# Patient Record
Sex: MALE | Race: WHITE | NOT HISPANIC OR LATINO | Employment: OTHER | URBAN - METROPOLITAN AREA
[De-identification: names, ages, dates, MRNs, and addresses within clinical notes are randomized per-mention and may not be internally consistent; named-entity substitution may affect disease eponyms.]

---

## 2017-01-04 ENCOUNTER — CLINICAL SUPPORT (OUTPATIENT)
Dept: CARDIOLOGY | Facility: CLINIC | Age: 72
End: 2017-01-04
Payer: MEDICARE

## 2017-01-04 DIAGNOSIS — I73.9 PVD (PERIPHERAL VASCULAR DISEASE): Chronic | ICD-10-CM

## 2017-01-04 LAB — VASCULAR ANKLE BRACHIAL INDEX (ABI) LEFT: 0.68 (ref 0.9–1.2)

## 2017-01-04 PROCEDURE — 93924 LWR XTR VASC STDY BILAT: CPT | Mod: S$GLB,,, | Performed by: INTERNAL MEDICINE

## 2017-01-05 ENCOUNTER — TELEPHONE (OUTPATIENT)
Dept: CARDIOLOGY | Facility: CLINIC | Age: 72
End: 2017-01-05

## 2017-01-05 NOTE — TELEPHONE ENCOUNTER
----- Message from Mayank Borrero MD sent at 1/4/2017  5:17 PM CST -----  Please inform the patient that the test confirms significant blockage in his leg arteries. Continue the new medication plus daily walks . Very important he stop smoking. If blockage worsens, could potentially lose his legs.

## 2017-01-13 RX ORDER — LISINOPRIL 40 MG/1
TABLET ORAL
Qty: 90 TABLET | Refills: 3 | Status: SHIPPED | OUTPATIENT
Start: 2017-01-13 | End: 2017-11-13 | Stop reason: SDUPTHER

## 2017-01-13 RX ORDER — AMLODIPINE BESYLATE 10 MG/1
TABLET ORAL
Qty: 90 TABLET | Refills: 3 | Status: SHIPPED | OUTPATIENT
Start: 2017-01-13 | End: 2017-11-13 | Stop reason: SDUPTHER

## 2017-01-13 RX ORDER — CHLORTHALIDONE 25 MG/1
TABLET ORAL
Qty: 90 TABLET | Refills: 3 | Status: SHIPPED | OUTPATIENT
Start: 2017-01-13 | End: 2017-11-13 | Stop reason: SDUPTHER

## 2017-06-21 ENCOUNTER — OFFICE VISIT (OUTPATIENT)
Dept: GASTROENTEROLOGY | Facility: CLINIC | Age: 72
End: 2017-06-21
Payer: MEDICARE

## 2017-06-21 VITALS
BODY MASS INDEX: 23.74 KG/M2 | HEART RATE: 72 BPM | DIASTOLIC BLOOD PRESSURE: 62 MMHG | SYSTOLIC BLOOD PRESSURE: 108 MMHG | HEIGHT: 66 IN | WEIGHT: 147.69 LBS

## 2017-06-21 DIAGNOSIS — Z12.11 SCREENING FOR MALIGNANT NEOPLASM OF COLON: Primary | ICD-10-CM

## 2017-06-21 DIAGNOSIS — K21.00 GASTROESOPHAGEAL REFLUX DISEASE WITH ESOPHAGITIS: ICD-10-CM

## 2017-06-21 PROCEDURE — 99999 PR PBB SHADOW E&M-EST. PATIENT-LVL III: CPT | Mod: PBBFAC,,, | Performed by: INTERNAL MEDICINE

## 2017-06-21 PROCEDURE — 1159F MED LIST DOCD IN RCRD: CPT | Mod: S$GLB,,, | Performed by: INTERNAL MEDICINE

## 2017-06-21 PROCEDURE — 99499 UNLISTED E&M SERVICE: CPT | Mod: S$GLB,,, | Performed by: INTERNAL MEDICINE

## 2017-06-21 PROCEDURE — 99203 OFFICE O/P NEW LOW 30 MIN: CPT | Mod: S$GLB,,, | Performed by: INTERNAL MEDICINE

## 2017-06-21 PROCEDURE — 1126F AMNT PAIN NOTED NONE PRSNT: CPT | Mod: S$GLB,,, | Performed by: INTERNAL MEDICINE

## 2017-06-21 RX ORDER — OMEPRAZOLE 40 MG/1
40 CAPSULE, DELAYED RELEASE ORAL EVERY MORNING
Qty: 90 CAPSULE | Refills: 3 | Status: SHIPPED | OUTPATIENT
Start: 2017-06-21 | End: 2018-06-20 | Stop reason: SDUPTHER

## 2017-06-21 NOTE — PATIENT INSTRUCTIONS
Colonoscopy scheduled for July 12 with Dr. Mckeon.  [facility] explained contact information  given to patient.

## 2017-06-21 NOTE — PROGRESS NOTES
Subjective:       Patient ID: Rex Song is a 72 y.o. male.    Chief Complaint: Medication Refill    This is a 72-year-old male who presents for evaluation of reflux.  He has some heartburn symptoms in the past and sent dysphagia with esophagitis.  He takes omeprazole 40 mg daily with good control of symptoms.  No dysphagia, odynophagia.  He has a history of Schatzki's ring which is dilated.  No vomiting, melena, unintentional weight loss.  No other exacerbating or relieving factors or other associated symptoms.  There are moderate intensity.  Additionally has a history of colon polyps which were incompletely resected and tattooed with a subsequent colonoscopy noting no abnormal mucosa. A one year follow-up was recommended in 2014.    The following portions of the patient's history were reviewed and updated as appropriate: allergies, current medications, past family history, past medical history, past social history, past surgical history and problem list.    (Portions of this note were dictated using voice recognition software and may contain dictation related errors in spelling/grammar/syntax not found on text review)    HPI  Review of Systems   Constitutional: Negative for chills and fever.   HENT: Negative for postnasal drip and trouble swallowing.    Eyes: Negative for pain and visual disturbance.   Respiratory: Negative for choking and shortness of breath.    Cardiovascular: Negative for chest pain and leg swelling.   Gastrointestinal: Negative for abdominal distention and anal bleeding.   Endocrine: Negative for cold intolerance and heat intolerance.   Genitourinary: Negative for difficulty urinating and hematuria.   Neurological: Negative for dizziness and numbness.   Hematological: Negative for adenopathy. Does not bruise/bleed easily.   Psychiatric/Behavioral: Negative for agitation and confusion.       Objective:      Physical Exam   Constitutional: He is oriented to person, place, and time. He  appears well-developed and well-nourished. No distress.   HENT:   Head: Normocephalic and atraumatic.   Eyes: Conjunctivae are normal. No scleral icterus.   Neck: No tracheal deviation present. No thyromegaly present.   Cardiovascular: Normal rate, regular rhythm and normal heart sounds.  Exam reveals no gallop and no friction rub.    No murmur heard.  Pulmonary/Chest: Effort normal and breath sounds normal. He has no wheezes. He has no rales.   Abdominal: Soft. Bowel sounds are normal. He exhibits no distension. There is no tenderness. There is no rebound and no guarding.   Musculoskeletal: Normal range of motion. He exhibits no edema or tenderness.   Neurological: He is alert and oriented to person, place, and time.   Skin: He is not diaphoretic.   Psychiatric: He has a normal mood and affect. His behavior is normal.   Nursing note and vitals reviewed.      Labs; pathology reviewed   Assessment:       1. Screening for malignant neoplasm of colon    2. Gastroesophageal reflux disease with esophagitis        Plan:   1. Continue PPI, risks/benefits explained in detail  2. Surveillance colonoscopy

## 2017-07-03 ENCOUNTER — TELEPHONE (OUTPATIENT)
Dept: GASTROENTEROLOGY | Facility: CLINIC | Age: 72
End: 2017-07-03

## 2017-07-05 ENCOUNTER — TELEPHONE (OUTPATIENT)
Dept: GASTROENTEROLOGY | Facility: CLINIC | Age: 72
End: 2017-07-05

## 2017-07-13 ENCOUNTER — TELEPHONE (OUTPATIENT)
Dept: GASTROENTEROLOGY | Facility: CLINIC | Age: 72
End: 2017-07-13

## 2017-10-03 ENCOUNTER — TELEPHONE (OUTPATIENT)
Dept: GASTROENTEROLOGY | Facility: CLINIC | Age: 72
End: 2017-10-03

## 2017-10-11 ENCOUNTER — OFFICE VISIT (OUTPATIENT)
Dept: CARDIOLOGY | Facility: CLINIC | Age: 72
End: 2017-10-11
Payer: MEDICARE

## 2017-10-11 VITALS
WEIGHT: 148.13 LBS | DIASTOLIC BLOOD PRESSURE: 62 MMHG | BODY MASS INDEX: 23.25 KG/M2 | SYSTOLIC BLOOD PRESSURE: 110 MMHG | HEART RATE: 73 BPM | HEIGHT: 67 IN

## 2017-10-11 DIAGNOSIS — I10 BENIGN ESSENTIAL HTN: ICD-10-CM

## 2017-10-11 DIAGNOSIS — I73.9 PVD (PERIPHERAL VASCULAR DISEASE): ICD-10-CM

## 2017-10-11 DIAGNOSIS — I25.10 CORONARY ARTERY DISEASE, ANGINA PRESENCE UNSPECIFIED, UNSPECIFIED VESSEL OR LESION TYPE, UNSPECIFIED WHETHER NATIVE OR TRANSPLANTED HEART: Primary | ICD-10-CM

## 2017-10-11 DIAGNOSIS — I71.40 ABDOMINAL AORTIC ANEURYSM (AAA) WITHOUT RUPTURE: ICD-10-CM

## 2017-10-11 PROCEDURE — 99499 UNLISTED E&M SERVICE: CPT | Mod: S$GLB,,, | Performed by: INTERNAL MEDICINE

## 2017-10-11 PROCEDURE — 99999 PR PBB SHADOW E&M-EST. PATIENT-LVL III: CPT | Mod: PBBFAC,GC,, | Performed by: INTERNAL MEDICINE

## 2017-10-11 PROCEDURE — 99213 OFFICE O/P EST LOW 20 MIN: CPT | Mod: GC,S$GLB,, | Performed by: INTERNAL MEDICINE

## 2017-10-11 NOTE — PROGRESS NOTES
I have personally taken the history and examined this patient and agree with the fellow's note as stated above. Patient given the Smoking Cessation program pamphlet and encouraged him to call explaining the potential dangers of his PAD with ongoing smoking.

## 2017-10-11 NOTE — PROGRESS NOTES
Cardiology Clinic Note  Reason for Visit: Coronary Artery Disease    HPI:   Mr. Rex Song is a 72 y.o. gentleman with history of CAD s/p PCI (Cx/PDA 2000), peripheral vascular disease, hypertension with good control, dyslipidemia on high intensity statin At LDL goal, abdominal aortic aneurysm that is small and stable, active smoker.   He had ABIs done 1/4/2017 after last follow up with resting GONZALO b/l 0.68 and on exercise 0.37 (R) and 0.31 (L).  Patient notes that his leg pain has improved from occurring on walking 1 block to now being able to walk 2.5 blocks before stopping.  This improvement is after starting pletal. Pain recedes with rest.  He is aware of his limitations.  He does not have any shortness of breath on exertion, weight gain, PND, orthopnea, chest pain, or palpitations.  He continues to smoke about 1 pack/week and presents to clinic today for follow up.   ROS:    Review of Systems   Constitution: Negative.   HENT: Negative.    Eyes: Negative.    Cardiovascular: Positive for claudication.   Respiratory: Negative.    Endocrine: Negative.    Skin: Negative.    Musculoskeletal: Negative.    Gastrointestinal: Negative.    Genitourinary: Negative.    Neurological: Negative.      PMH:     Past Medical History:   Diagnosis Date    CHF (congestive heart failure)     Hypertension     Personal history of colonic polyps 2005    PVD (peripheral vascular disease)      Past Surgical History:   Procedure Laterality Date    CORONARY ANGIOPLASTY WITH STENT PLACEMENT  08/18/2000    LCX stent/ PDA stent     Allergies:     Review of patient's allergies indicates:   Allergen Reactions    Cortisone      Other reaction(s): Itching     Medications:     Current Outpatient Prescriptions on File Prior to Visit   Medication Sig Dispense Refill    amlodipine (NORVASC) 10 MG tablet TAKE 1 TABLET ONE TIME DAILY 90 tablet 3    aspirin (ECOTRIN) 81 MG EC tablet Take 81 mg by mouth. 1 Tablet, Delayed Release (E.C.)  "Oral At bedtime      chlorthalidone (HYGROTEN) 25 MG Tab TAKE 1 TABLET ONE TIME DAILY 90 tablet 3    cilostazol (PLETAL) 100 MG Tab Take 1 tablet (100 mg total) by mouth 2 (two) times daily before meals. 60 tablet 11    lisinopril (PRINIVIL,ZESTRIL) 40 MG tablet TAKE 1 TABLET ONE TIME DAILY 90 tablet 3    metoprolol succinate (TOPROL-XL) 50 MG 24 hr tablet TAKE 1 TABLET ONE TIME DAILY 90 tablet 3    nitroGLYCERIN (NITROSTAT) 0.4 MG SL tablet Place 1 tablet (0.4 mg total) under the tongue every 5 (five) minutes as needed. 25 tablet 5    omeprazole (PRILOSEC) 40 MG capsule Take 1 capsule (40 mg total) by mouth every morning. 90 capsule 3    rosuvastatin (CRESTOR) 40 MG Tab Take 1 tablet (40 mg total) by mouth once daily. 90 tablet 3     No current facility-administered medications on file prior to visit.      Social History:     Social History   Substance Use Topics    Smoking status: Former Smoker     Packs/day: 0.25     Types: Cigarettes    Smokeless tobacco: Never Used    Alcohol use No     Family History:     Family History   Problem Relation Age of Onset    Prostate cancer Brother 59     Physical Exam:   /62 (BP Location: Left arm, Patient Position: Sitting, BP Method: Large (Automatic))   Pulse 73   Ht 5' 6.5" (1.689 m)   Wt 67.2 kg (148 lb 2.4 oz)   BMI 23.55 kg/m²    Physical Exam   Constitutional: He is oriented to person, place, and time. He appears well-developed and well-nourished.   HENT:   Head: Normocephalic and atraumatic.   Eyes: Conjunctivae and EOM are normal. Pupils are equal, round, and reactive to light.   Neck: Normal range of motion. Neck supple. No JVD present.   Cardiovascular: Normal rate, regular rhythm and normal heart sounds.  Exam reveals no gallop and no friction rub.    No murmur heard.  Claudication at 2.5 blocks   Pulmonary/Chest: Effort normal and breath sounds normal. No respiratory distress. He has no wheezes. He has no rales. He exhibits no tenderness. "   Abdominal: Soft. Bowel sounds are normal. He exhibits no distension. There is no tenderness.   Musculoskeletal: Normal range of motion. He exhibits no edema or tenderness.   Neurological: He is alert and oriented to person, place, and time.   Skin: Skin is warm and dry. No erythema. No pallor.   Cap refill < 2s       Labs:     Lab Results   Component Value Date     2016    K 4.2 2016     2016    CO2 27 2016    BUN 24 (H) 2016    CREATININE 1.7 (H) 2016    ANIONGAP 9 2016     No results found for: HGBA1C  No results found for: BNP, BNPTRIAGEBLO Lab Results   Component Value Date    WBC 7.22 2015    HGB 16.1 2015    HCT 51.1 2015     2015    GRAN 5.1 2015    GRAN 70.8 2015     Lab Results   Component Value Date    CHOL 122 2016    HDL 28 (L) 2016    LDLCALC 52.0 (L) 2016    TRIG 210 (H) 2016          Imagin/2017 GONZALO as above    Echo 2015  CONCLUSIONS     1 - Low normal to mildly depressed left ventricular systolic function (EF 50%).     2 - Normal left ventricular diastolic function.     3 - Normal right ventricular systolic function .     4 - Trivial aortic regurgitation.     5 - Trivial tricuspid regurgitation.     6 - Low central venous pressure.     SPECT   1. There is a small to moderate size fixed defect of moderate intensity that extends from the base to the distal inferior wall of the left ventricle, consistent with myocardial injury. There is mild golden-injury ischemia.   2. There is trivial apical thinning which is a normal variant.   3. There is abnormal wall motion at rest showing hypokinesis of the inferoseptal wall of the left ventricle.   4. There is resting LV dysfunction with a reduced ejection fraction of 42 %.  (normal is >= 51%)  5. The ventricular volumes are normal at rest and stress.   6. The extracardiac distribution of radioactivity is normal.   7. There was no  previous study available to compare.    EKG: no repeat ekg today  Assessment:     1. Coronary artery disease, angina presence unspecified, unspecified vessel or lesion type, unspecified whether native or transplanted heart    2. PVD (peripheral vascular disease)    3. Benign essential HTN    4. Abdominal aortic aneurysm (AAA) without rupture          Plan:   Coronary artery disease, angina presence unspecified, unspecified vessel or lesion type, unspecified whether native or transplanted heart  Continue amlodipine  Asa  Lisinopril  toprol xl  crestor    PVD (peripheral vascular disease)  Continue asa/pletal/crestor  Improvement in symptoms and activity tolerance  Smoking cessation stressed    Benign essential HTN  Continue toprol/amlodipine/lisinopril/chlorthalidone  Well tolerated    Abdominal aortic aneurysm (AAA) without rupture  Treat PVD as above  Important to quit smoking    Would repeat blood work with cbc/cmp/lipid profile    Discussed with Dr. Borrero. RTC in 1 year.   Spoke with patient re: ADAPTABLE and seemingly willing to partake.    Signed:  Callum Hickman MD  10/11/2017 4:07 PM

## 2017-10-12 ENCOUNTER — TELEPHONE (OUTPATIENT)
Dept: RESEARCH | Facility: HOSPITAL | Age: 72
End: 2017-10-12

## 2017-10-18 ENCOUNTER — LAB VISIT (OUTPATIENT)
Dept: LAB | Facility: HOSPITAL | Age: 72
End: 2017-10-18
Attending: INTERNAL MEDICINE
Payer: MEDICARE

## 2017-10-18 DIAGNOSIS — I73.9 PVD (PERIPHERAL VASCULAR DISEASE): ICD-10-CM

## 2017-10-18 DIAGNOSIS — I25.10 CORONARY ARTERY DISEASE, ANGINA PRESENCE UNSPECIFIED, UNSPECIFIED VESSEL OR LESION TYPE, UNSPECIFIED WHETHER NATIVE OR TRANSPLANTED HEART: ICD-10-CM

## 2017-10-18 LAB
ALBUMIN SERPL BCP-MCNC: 3.4 G/DL
ALP SERPL-CCNC: 105 U/L
ALT SERPL W/O P-5'-P-CCNC: 9 U/L
ANION GAP SERPL CALC-SCNC: 9 MMOL/L
AST SERPL-CCNC: 14 U/L
BASOPHILS # BLD AUTO: 0.05 K/UL
BASOPHILS NFR BLD: 0.8 %
BILIRUB SERPL-MCNC: 0.4 MG/DL
BUN SERPL-MCNC: 25 MG/DL
CALCIUM SERPL-MCNC: 9.7 MG/DL
CHLORIDE SERPL-SCNC: 106 MMOL/L
CHOLEST SERPL-MCNC: 111 MG/DL
CHOLEST/HDLC SERPL: 3.5 {RATIO}
CO2 SERPL-SCNC: 27 MMOL/L
CREAT SERPL-MCNC: 2.4 MG/DL
DIFFERENTIAL METHOD: ABNORMAL
EOSINOPHIL # BLD AUTO: 0.2 K/UL
EOSINOPHIL NFR BLD: 2.5 %
ERYTHROCYTE [DISTWIDTH] IN BLOOD BY AUTOMATED COUNT: 14.1 %
EST. GFR  (AFRICAN AMERICAN): 29.6 ML/MIN/1.73 M^2
EST. GFR  (NON AFRICAN AMERICAN): 25.6 ML/MIN/1.73 M^2
GLUCOSE SERPL-MCNC: 97 MG/DL
HCT VFR BLD AUTO: 43.6 %
HDLC SERPL-MCNC: 32 MG/DL
HDLC SERPL: 28.8 %
HGB BLD-MCNC: 14.3 G/DL
IMM GRANULOCYTES # BLD AUTO: 0.02 K/UL
IMM GRANULOCYTES NFR BLD AUTO: 0.3 %
LDLC SERPL CALC-MCNC: 55.4 MG/DL
LYMPHOCYTES # BLD AUTO: 1.3 K/UL
LYMPHOCYTES NFR BLD: 20.3 %
MCH RBC QN AUTO: 28.5 PG
MCHC RBC AUTO-ENTMCNC: 32.8 G/DL
MCV RBC AUTO: 87 FL
MONOCYTES # BLD AUTO: 0.5 K/UL
MONOCYTES NFR BLD: 8 %
NEUTROPHILS # BLD AUTO: 4.4 K/UL
NEUTROPHILS NFR BLD: 68.1 %
NONHDLC SERPL-MCNC: 79 MG/DL
NRBC BLD-RTO: 0 /100 WBC
PLATELET # BLD AUTO: 144 K/UL
PMV BLD AUTO: 10.4 FL
POTASSIUM SERPL-SCNC: 4.2 MMOL/L
PROT SERPL-MCNC: 7.2 G/DL
RBC # BLD AUTO: 5.02 M/UL
SODIUM SERPL-SCNC: 142 MMOL/L
TRIGL SERPL-MCNC: 118 MG/DL
WBC # BLD AUTO: 6.51 K/UL

## 2017-10-18 PROCEDURE — 36415 COLL VENOUS BLD VENIPUNCTURE: CPT

## 2017-10-18 PROCEDURE — 85025 COMPLETE CBC W/AUTO DIFF WBC: CPT

## 2017-10-18 PROCEDURE — 80061 LIPID PANEL: CPT

## 2017-10-18 PROCEDURE — 80053 COMPREHEN METABOLIC PANEL: CPT

## 2017-10-26 DIAGNOSIS — N28.9 FUNCTION KIDNEY DECREASED: Primary | ICD-10-CM

## 2017-11-13 DIAGNOSIS — I25.10 CORONARY ARTERY DISEASE INVOLVING NATIVE CORONARY ARTERY WITHOUT ANGINA PECTORIS, UNSPECIFIED WHETHER NATIVE OR TRANSPLANTED HEART: ICD-10-CM

## 2017-11-13 DIAGNOSIS — I10 ESSENTIAL HYPERTENSION: ICD-10-CM

## 2017-11-13 RX ORDER — METOPROLOL SUCCINATE 50 MG/1
TABLET, EXTENDED RELEASE ORAL
Qty: 90 TABLET | Refills: 3 | Status: SHIPPED | OUTPATIENT
Start: 2017-11-13 | End: 2019-02-25 | Stop reason: SDUPTHER

## 2017-12-01 RX ORDER — CHLORTHALIDONE 25 MG/1
TABLET ORAL
Qty: 90 TABLET | Refills: 3 | Status: SHIPPED | OUTPATIENT
Start: 2017-12-01 | End: 2022-03-31 | Stop reason: SDUPTHER

## 2017-12-01 RX ORDER — LISINOPRIL 40 MG/1
TABLET ORAL
Qty: 90 TABLET | Refills: 3 | Status: SHIPPED | OUTPATIENT
Start: 2017-12-01 | End: 2019-01-18 | Stop reason: SDUPTHER

## 2017-12-01 RX ORDER — AMLODIPINE BESYLATE 10 MG/1
TABLET ORAL
Qty: 90 TABLET | Refills: 3 | Status: SHIPPED | OUTPATIENT
Start: 2017-12-01 | End: 2019-01-18 | Stop reason: SDUPTHER

## 2017-12-06 ENCOUNTER — LAB VISIT (OUTPATIENT)
Dept: LAB | Facility: HOSPITAL | Age: 72
End: 2017-12-06
Payer: MEDICARE

## 2017-12-06 ENCOUNTER — OFFICE VISIT (OUTPATIENT)
Dept: NEPHROLOGY | Facility: CLINIC | Age: 72
End: 2017-12-06
Payer: MEDICARE

## 2017-12-06 VITALS
HEIGHT: 66 IN | WEIGHT: 149.25 LBS | BODY MASS INDEX: 23.99 KG/M2 | DIASTOLIC BLOOD PRESSURE: 60 MMHG | OXYGEN SATURATION: 95 % | SYSTOLIC BLOOD PRESSURE: 130 MMHG | HEART RATE: 82 BPM

## 2017-12-06 DIAGNOSIS — N18.4 CKD STAGE G4/A1, GFR 15-29 AND ALBUMIN CREATININE RATIO <30 MG/G: Primary | ICD-10-CM

## 2017-12-06 DIAGNOSIS — N18.30 CKD (CHRONIC KIDNEY DISEASE) STAGE 3, GFR 30-59 ML/MIN: ICD-10-CM

## 2017-12-06 LAB
BILIRUB UR QL STRIP: NEGATIVE
CLARITY UR REFRACT.AUTO: CLEAR
COLOR UR AUTO: YELLOW
CREAT UR-MCNC: 111 MG/DL
GLUCOSE UR QL STRIP: NEGATIVE
HGB UR QL STRIP: NEGATIVE
KETONES UR QL STRIP: NEGATIVE
LEUKOCYTE ESTERASE UR QL STRIP: NEGATIVE
NITRITE UR QL STRIP: NEGATIVE
PH UR STRIP: 5 [PH] (ref 5–8)
PROT UR QL STRIP: NEGATIVE
PROT UR-MCNC: 21 MG/DL
PROT/CREAT RATIO, UR: 0.19
SP GR UR STRIP: 1.01 (ref 1–1.03)
URN SPEC COLLECT METH UR: NORMAL
UROBILINOGEN UR STRIP-ACNC: NEGATIVE EU/DL

## 2017-12-06 PROCEDURE — 81003 URINALYSIS AUTO W/O SCOPE: CPT

## 2017-12-06 PROCEDURE — 99204 OFFICE O/P NEW MOD 45 MIN: CPT | Mod: GC,S$GLB,, | Performed by: INTERNAL MEDICINE

## 2017-12-06 PROCEDURE — 82570 ASSAY OF URINE CREATININE: CPT

## 2017-12-06 PROCEDURE — 99999 PR PBB SHADOW E&M-EST. PATIENT-LVL III: CPT | Mod: PBBFAC,GC,, | Performed by: INTERNAL MEDICINE

## 2017-12-06 NOTE — PROGRESS NOTES
CHIEF COMPLAINT/HPI: Rex is a 72 y.o. male for evaluation of RANDY    Mr. Song is a 71 yo WM with HTN, HFrEF (EF 50%, no DD), CAD, and PVD who was referred to nephrology clinic today for elevated sCr. Per chart review he has a degree of CKD with a slowly rising sCr since 2012. No urine studies available for review.   He was diagnosed with HTN in the 1980s. He reports BP has always been well-controlled. No hospitalizations for HTN emergency. Chart review reveals well-controlled BP at other outpatient clinic visits. No h/o DM, nephrolithiasis, frequent UTIs. Denies NSAID use aside from a baby aspirin every night. No family history kidney disease; no h/o ESRD.             Past Medical History:   Diagnosis Date    CHF (congestive heart failure)     Hypertension     Personal history of colonic polyps 2005    PVD (peripheral vascular disease)        Rex reports that he has quit smoking. His smoking use included Cigarettes. He smoked 0.25 packs per day. He has never used smokeless tobacco. He reports that he does not drink alcohol.    Family History   Problem Relation Age of Onset    Prostate cancer Brother 59       ROS:  Const: no fevers. Appetite good. Denies weight loss.   Eyes: no vision changes. +cataract surgery  HENT: no headaches, rhinorrhea, sore throat  Heme: no easy bleeding or bruising  Lymph: no LAD or swollen glands  CV: no chest pain. +intermittent ankle edema  Resp: no SOB or cough  GI: no diarrhea or constipation  : no dysuria or hematuria. +polyuria  Skin: no new skin rashes or lesions.     Current Outpatient Prescriptions   Medication Sig Dispense Refill    amLODIPine (NORVASC) 10 MG tablet TAKE 1 TABLET ONE TIME DAILY 90 tablet 3    aspirin (ECOTRIN) 81 MG EC tablet Take 81 mg by mouth. 1 Tablet, Delayed Release (E.C.) Oral At bedtime      chlorthalidone (HYGROTEN) 25 MG Tab TAKE 1 TABLET ONE TIME DAILY 90 tablet 3    cilostazol (PLETAL) 100 MG Tab Take 1 tablet (100 mg total) by  "mouth 2 (two) times daily before meals. 60 tablet 11    lisinopril (PRINIVIL,ZESTRIL) 40 MG tablet TAKE 1 TABLET ONE TIME DAILY 90 tablet 3    metoprolol succinate (TOPROL-XL) 50 MG 24 hr tablet TAKE 1 TABLET ONE TIME DAILY 90 tablet 3    nitroGLYCERIN (NITROSTAT) 0.4 MG SL tablet Place 1 tablet (0.4 mg total) under the tongue every 5 (five) minutes as needed. 25 tablet 5    omeprazole (PRILOSEC) 40 MG capsule Take 1 capsule (40 mg total) by mouth every morning. 90 capsule 3    rosuvastatin (CRESTOR) 40 MG Tab Take 1 tablet (40 mg total) by mouth once daily. 90 tablet 3     No current facility-administered medications for this visit.        PE:  Blood pressure 130/60, pulse 82, height 5' 6" (1.676 m), weight 67.7 kg (149 lb 4 oz), SpO2 95 %.  Gen: Thin WM in NAD. Appears older than stated age  Eyes: EOMI, clear conjunctivae  HENT: NC/AT. MMM  Neck: supple, no thyromegaly  Lymph: no cervical or supraclavicular LAD  CV: RRR. No peripheral edema  Resp: CTAB, no w/r/r  Abd: Soft, NTND.   Skin: warm. Senile purpura to BLE      Impression/Plan:    1. CKD stage G4/A1, GFR 15-29 and albumin creatinine ratio <30 mg/g      - eGFR < 60 since at least 2004  - progressive and steady decline in renal function since that time  - uncertain etiology. Patient is a poor historian. Chart review reveals well-controlled BPs at outpatient visits. No signs of CHF on exam. Possibly related to renal arterionephrosclerosis in setting of significant vascular disease  - baseline sCr appears to be 2.4-2.5  - ordered urine studies today. No significant proteinuria. RPGN ruled out. Continue ACEi.  - will order renal US   - provided education about the stages of CKD, usual progression, and need to monitor for and treat CKD-related disease in an effort to delay progression of CKD. Informed patient of his current advanced kidney disease and need to start RRT once eGFR < 10.   - RTC 2-3 months      Aminta Hackett, PGY-5  Nephrology " Fellow  [facility]  Pager: [phone]

## 2017-12-11 NOTE — PROGRESS NOTES
[facility] STAFF NOTE    The note from the fellow/resident was reviewed. I have personally interviewed and examined the patient. There were no additional findings with regards to the history or physical exam.    I agree with the assessment and plan of Dr. Mclaughlin.

## 2017-12-12 RX ORDER — ROSUVASTATIN CALCIUM 40 MG/1
40 TABLET, COATED ORAL DAILY
Qty: 90 TABLET | Refills: 3 | Status: SHIPPED | OUTPATIENT
Start: 2017-12-12 | End: 2018-12-17 | Stop reason: SDUPTHER

## 2018-01-31 ENCOUNTER — HOSPITAL ENCOUNTER (OUTPATIENT)
Dept: RADIOLOGY | Facility: HOSPITAL | Age: 73
Discharge: HOME | End: 2018-01-31
Attending: INTERNAL MEDICINE
Payer: MEDICARE

## 2018-01-31 DIAGNOSIS — N18.4 CKD STAGE G4/A1, GFR 15-29 AND ALBUMIN CREATININE RATIO <30 MG/G: ICD-10-CM

## 2018-01-31 PROCEDURE — 76770 US EXAM ABDO BACK WALL COMP: CPT | Mod: TC

## 2018-01-31 PROCEDURE — 76770 US EXAM ABDO BACK WALL COMP: CPT | Mod: 26,,, | Performed by: RADIOLOGY

## 2018-02-07 ENCOUNTER — OFFICE VISIT (OUTPATIENT)
Dept: NEPHROLOGY | Facility: CLINIC | Age: 73
End: 2018-02-07
Payer: MEDICARE

## 2018-02-07 ENCOUNTER — LAB VISIT (OUTPATIENT)
Dept: LAB | Facility: HOSPITAL | Age: 73
End: 2018-02-07
Payer: MEDICARE

## 2018-02-07 VITALS
BODY MASS INDEX: 22.82 KG/M2 | SYSTOLIC BLOOD PRESSURE: 112 MMHG | HEIGHT: 66 IN | OXYGEN SATURATION: 97 % | DIASTOLIC BLOOD PRESSURE: 60 MMHG | HEART RATE: 74 BPM | WEIGHT: 142 LBS

## 2018-02-07 DIAGNOSIS — N18.4 CKD (CHRONIC KIDNEY DISEASE) STAGE 4, GFR 15-29 ML/MIN: Primary | ICD-10-CM

## 2018-02-07 DIAGNOSIS — N28.1 COMPLEX RENAL CYST: ICD-10-CM

## 2018-02-07 DIAGNOSIS — N18.4 CKD (CHRONIC KIDNEY DISEASE) STAGE 4, GFR 15-29 ML/MIN: ICD-10-CM

## 2018-02-07 LAB
BACTERIA #/AREA URNS AUTO: NORMAL /HPF
BILIRUB UR QL STRIP: NEGATIVE
CLARITY UR REFRACT.AUTO: ABNORMAL
COLOR UR AUTO: YELLOW
CREAT UR-MCNC: 152 MG/DL
GLUCOSE UR QL STRIP: NEGATIVE
HGB UR QL STRIP: ABNORMAL
HYALINE CASTS UR QL AUTO: 0 /LPF
KETONES UR QL STRIP: NEGATIVE
LEUKOCYTE ESTERASE UR QL STRIP: NEGATIVE
MICROSCOPIC COMMENT: NORMAL
NITRITE UR QL STRIP: NEGATIVE
PH UR STRIP: 5 [PH] (ref 5–8)
PROT UR QL STRIP: ABNORMAL
PROT UR-MCNC: 65 MG/DL
PROT/CREAT RATIO, UR: 0.43
RBC #/AREA URNS AUTO: 0 /HPF (ref 0–4)
SP GR UR STRIP: 1.02 (ref 1–1.03)
SQUAMOUS #/AREA URNS AUTO: 0 /HPF
URN SPEC COLLECT METH UR: ABNORMAL
UROBILINOGEN UR STRIP-ACNC: 2 EU/DL
WBC #/AREA URNS AUTO: 1 /HPF (ref 0–5)

## 2018-02-07 PROCEDURE — 99999 PR PBB SHADOW E&M-EST. PATIENT-LVL III: CPT | Mod: PBBFAC,GC,, | Performed by: INTERNAL MEDICINE

## 2018-02-07 PROCEDURE — 99214 OFFICE O/P EST MOD 30 MIN: CPT | Mod: GC,S$GLB,, | Performed by: INTERNAL MEDICINE

## 2018-02-07 PROCEDURE — 82570 ASSAY OF URINE CREATININE: CPT

## 2018-02-07 PROCEDURE — 81001 URINALYSIS AUTO W/SCOPE: CPT

## 2018-02-07 PROCEDURE — 1126F AMNT PAIN NOTED NONE PRSNT: CPT | Mod: GC,S$GLB,, | Performed by: INTERNAL MEDICINE

## 2018-02-07 PROCEDURE — 3008F BODY MASS INDEX DOCD: CPT | Mod: GC,S$GLB,, | Performed by: INTERNAL MEDICINE

## 2018-02-07 PROCEDURE — 1159F MED LIST DOCD IN RCRD: CPT | Mod: GC,S$GLB,, | Performed by: INTERNAL MEDICINE

## 2018-02-07 NOTE — PROGRESS NOTES
Subjective:       Patient ID: Rex Song is a 73 y.o. White male who presents for follow-up evaluation of Chronic Kidney Disease    HPI   Mr. Song is a 73 yo WM with HTN, HFrEF (EF 50%, no DD), CAD, and PVD who was referred to nephrology clinic on 12/6/17 for elevated sCr. Per chart review he has a degree of CKD with a slowly rising sCr since 2012. No urine studies available for review. He was diagnosed with HTN in the 1980s. He reports BP has always been well-controlled. No hospitalizations for HTN emergency. Chart review reveals well-controlled BP at other outpatient clinic visits. No h/o DM, nephrolithiasis, frequent UTIs. Denies NSAID use aside from a baby aspirin every night. No family history kidney disease; no h/o ESRD.   Patient is a poor historian.           Interval Hx:  LV 12/2017. No medication changes at that time.   Today he reports to be doing well. No recent clinic visits or hospitalizations.   HTN: well-controlled at home but doesn't check it very often. He reports compliance with his BP medications but cannot name them.     Review of Systems    Resp: no SOB; no cough  CV: no chest pain; no LE edema  Neuro: no HA or light-headedness  : no dysuria, no hematuria  Skin: no new skin rashes or lesions  GI: no diarrhea or constipation      Objective:     Current Outpatient Prescriptions:     amLODIPine (NORVASC) 10 MG tablet, TAKE 1 TABLET ONE TIME DAILY, Disp: 90 tablet, Rfl: 3    aspirin (ECOTRIN) 81 MG EC tablet, Take 81 mg by mouth. 1 Tablet, Delayed Release (E.C.) Oral At bedtime, Disp: , Rfl:     chlorthalidone (HYGROTEN) 25 MG Tab, TAKE 1 TABLET ONE TIME DAILY, Disp: 90 tablet, Rfl: 3    lisinopril (PRINIVIL,ZESTRIL) 40 MG tablet, TAKE 1 TABLET ONE TIME DAILY, Disp: 90 tablet, Rfl: 3    metoprolol succinate (TOPROL-XL) 50 MG 24 hr tablet, TAKE 1 TABLET ONE TIME DAILY, Disp: 90 tablet, Rfl: 3    nitroGLYCERIN (NITROSTAT) 0.4 MG SL tablet, Place 1 tablet (0.4 mg total) under the  "tongue every 5 (five) minutes as needed., Disp: 25 tablet, Rfl: 5    omeprazole (PRILOSEC) 40 MG capsule, Take 1 capsule (40 mg total) by mouth every morning., Disp: 90 capsule, Rfl: 3    rosuvastatin (CRESTOR) 40 MG Tab, TAKE 1 TABLET (40 MG TOTAL) BY MOUTH ONCE DAILY., Disp: 90 tablet, Rfl: 3    cilostazol (PLETAL) 100 MG Tab, Take 1 tablet (100 mg total) by mouth 2 (two) times daily before meals., Disp: 60 tablet, Rfl: 11    Physical Exam    Blood pressure 112/60, pulse 74, height 5' 6" (1.676 m), weight 64.4 kg (141 lb 15.6 oz), SpO2 97 %.   LV 12/6/17: Blood pressure 130/60, pulse 82, height 5' 6" (1.676 m), weight 67.7 kg (149 lb 4 oz), SpO2 95 %  Gen: thin male in NAD  Eyes: EOMI, conjunctival injection bilaterally  HENT: NC/AT. MMM  CV: RRR. No peripheral edema  Resp: CTAB, no crackles  Skin: warm, normal turgor. No visible rash.   Psych: pleasant. Poor insight.       Renal US 1/31/18:  The kidneys are normal in size measuring 11.3 cm on the right and 10.5 cm on the left. There is decreased corticomedullary differentiation and decreased cortical thickness.  Bilateral renal cysts identified, the largest is seen in the lower pole of the right kidney that measures 5.1 x 4.4 x 5.0 cm and demonstrates septations measuring up to 3 mm.  This appears similar to prior CT findings.  Punctate nonobstructing renal calculi, the largest on the right measures 2 mm in the upper pole the largest on the left measures 3 mm in the midpole.  No solid renal massesor hydronephrosis.  Trace amount of perinephric fluid along the inferior pole of the left kidney.  Perfusion to the kidneys is normal. Resistive indices are normal and as follow: 0.73 on the right and 0.69 on the left. The urinary bladder appears normal.  The prostate is normal in size measuring 4.3 x 3.2 x 4.3 cm    Assessment:       1. CKD (chronic kidney disease) stage 4, GFR 15-29 ml/min    2. Complex renal cyst        Plan:   1. CKD:   - eGFR < 60 since at least " 2004  - progressive and steady decline in renal function since that time  - uncertain etiology. Patient is a poor historian. Chart review reveals well-controlled BPs at outpatient visits. No signs of CHF on exam. Possibly related to renal arterionephrosclerosis in setting of significant vascular disease  - baseline sCr appears to be 2.4-2.5  - renal US with CKD-related changes. Also renal cysts with septations; see below.   - sCr at baseline today  - provided education in the past about the stages of CKD, usual progression, and need to monitor for and treat CKD-related disease in an effort to delay progression of CKD. Informed patient of his current advanced kidney disease and need to start RRT once eGFR < 10.   - will attempt to get patient to bring a family member to his next visit to help with patient understanding and education    Lab Results   Component Value Date    CREATININE 2.3 (H) 01/31/2018         Protein Creatinine Ratios: previously negative on ACEi. Mild elevation c/w microalbuminuria today. Will repeat to assess for accuracy.     Prot/Creat Ratio, Ur   Date Value Ref Range Status   01/31/2018 0.45 (H) 0.00 - 0.20 Final   12/06/2017 0.19 0.00 - 0.20 Final       Acid-Base: no need for supplementation  Lab Results   Component Value Date     01/31/2018    K 4.2 01/31/2018    CO2 26 01/31/2018     2. HTN: Blood pressures controlled on 4-drug regimen of amlodipine, chlorthalidone, lisinopril, and toprol xl.     3. Renal osteodystrophy: last PTH wnl.   Lab Results   Component Value Date    PTH 49.0 01/31/2018    CALCIUM 9.9 01/31/2018    PHOS 2.5 (L) 01/31/2018       4. Anemia: none  Lab Results   Component Value Date    HGB 15.3 01/31/2018      5. Renal cyst  - noted on US; reportedly stable compared to CT from 2005 (unable to see report)  - per staff, will refer to urology for further evaluation      RTC 3 months.     Aminta Hackett, PGY-5  Nephrology Fellow  [facility]  Pager:  [phone]

## 2018-02-08 NOTE — PROGRESS NOTES
Patient seen and examined with Dr Hackett;   I have reviewed and agree with assessment and plan

## 2018-02-18 DIAGNOSIS — I73.9 PVD (PERIPHERAL VASCULAR DISEASE): Chronic | ICD-10-CM

## 2018-02-18 RX ORDER — CILOSTAZOL 100 MG/1
100 TABLET ORAL
Qty: 60 TABLET | Refills: 11 | Status: SHIPPED | OUTPATIENT
Start: 2018-02-18 | End: 2022-04-10

## 2018-03-14 ENCOUNTER — OFFICE VISIT (OUTPATIENT)
Dept: UROLOGY | Facility: CLINIC | Age: 73
End: 2018-03-14
Payer: MEDICARE

## 2018-03-14 VITALS
TEMPERATURE: 97 F | SYSTOLIC BLOOD PRESSURE: 117 MMHG | HEART RATE: 68 BPM | DIASTOLIC BLOOD PRESSURE: 62 MMHG | HEIGHT: 62 IN

## 2018-03-14 DIAGNOSIS — N28.1 RENAL CYST: Primary | ICD-10-CM

## 2018-03-14 PROCEDURE — 99999 PR PBB SHADOW E&M-EST. PATIENT-LVL III: CPT | Mod: PBBFAC,,, | Performed by: UROLOGY

## 2018-03-14 PROCEDURE — 3078F DIAST BP <80 MM HG: CPT | Mod: CPTII,S$GLB,, | Performed by: UROLOGY

## 2018-03-14 PROCEDURE — 99499 UNLISTED E&M SERVICE: CPT | Mod: S$GLB,,, | Performed by: UROLOGY

## 2018-03-14 PROCEDURE — 3074F SYST BP LT 130 MM HG: CPT | Mod: CPTII,S$GLB,, | Performed by: UROLOGY

## 2018-03-14 PROCEDURE — 99203 OFFICE O/P NEW LOW 30 MIN: CPT | Mod: S$GLB,,, | Performed by: UROLOGY

## 2018-03-14 NOTE — PROGRESS NOTES
Subjective:       Patient ID: Rex Song is a 73 y.o. male.    Chief Complaint:  Renal cyst.      History of Present Illness  HPI  Patient is a 73 y.o. male who is new to our clinic and referred by their PCP, Dr. Borrero for evaluation of renal cysts.  He denies abdominal pain. No hematuria.  No fevers or chills.  No dysuria.   His serum Cr most recently was reviewed and was 2.3 (eGFR 26.8) on 1/31/18.  He is being seen by nephrology, Dr. Lyn.   He has a h/o CKD stage 4 as well as CAD.  He had an MI in the 80's and is on plavix, aspirin, and pletal.  He denies any chest pain or shortness of breath.          Review of Systems  Review of Systems  All other systems reviewed and negative except pertinent positives noted in HPI.       Objective:     Physical Exam   Nursing note and vitals reviewed.  Constitutional: He is oriented to person, place, and time. Vital signs are normal. He appears well-developed and well-nourished. He is cooperative.   HENT:   Head: Normocephalic.   Neck: No tracheal deviation present.   Cardiovascular: Normal rate and intact distal pulses.    Pulmonary/Chest: Effort normal. No accessory muscle usage. No tachypnea. No respiratory distress.   Abdominal: Soft. Normal appearance. He exhibits no distension, no fluid wave, no ascites and no mass. There is no tenderness. There is no rebound and no CVA tenderness. No hernia. Hernia confirmed negative in the right inguinal area and confirmed negative in the left inguinal area.   Liver/spleen non-palpable.   Genitourinary: Prostate normal and testes normal. Rectal exam shows no external hemorrhoid, no mass, no tenderness and anal tone normal. Prostate is not tender. Right testis shows no mass and no tenderness. Right testis is descended. Left testis shows no mass and no tenderness. Left testis is descended. Uncircumcised. Phimosis present.   Genitourinary Comments: Scrotum showed no rashes or lesions.  Anus/perineum without lesion.  Epididymis  showed no masses or tenderness. No meatal stenosis, meatus in normal location. No penile discharge/plaques. vin deferred       Musculoskeletal: Normal range of motion.   Lymphadenopathy: No inguinal adenopathy noted on the right or left side.        Right: No inguinal adenopathy present.        Left: No inguinal adenopathy present.   Neurological: He is alert and oriented to person, place, and time. He has normal strength.   Skin: No bruising and no rash noted.     Psychiatric: He has a normal mood and affect. His speech is normal and behavior is normal. Thought content normal.       Lab Review  Lab Results   Component Value Date    COLORU Yellow 02/07/2018    SPECGRAV 1.020 02/07/2018    PHUR 5.0 02/07/2018    NITRITE Negative 02/07/2018    KETONESU Negative 02/07/2018    UROBILINOGEN 2.0 02/07/2018         Assessment:        1. Renal cyst            Plan:     Renal cyst  -     US Retroperitoneal Complete (Kidney and; Future; Expected date: 09/14/2018  -     Comprehensive metabolic panel; Future; Expected date: 09/14/2018      -complex renal cyst---explained implications to the patient.  His renal function would not allow IV contrasted CT scan.  However cyst stable since 2005 per radiology.  Will recheck renal US in 6 months.

## 2018-04-06 ENCOUNTER — PES CALL (OUTPATIENT)
Dept: ADMINISTRATIVE | Facility: CLINIC | Age: 73
End: 2018-04-06

## 2018-06-20 RX ORDER — OMEPRAZOLE 40 MG/1
CAPSULE, DELAYED RELEASE ORAL
Qty: 90 CAPSULE | Refills: 3 | Status: SHIPPED | OUTPATIENT
Start: 2018-06-20 | End: 2019-07-29 | Stop reason: SDUPTHER

## 2018-09-19 ENCOUNTER — TELEPHONE (OUTPATIENT)
Dept: UROLOGY | Facility: CLINIC | Age: 73
End: 2018-09-19

## 2018-09-19 ENCOUNTER — HOSPITAL ENCOUNTER (OUTPATIENT)
Dept: RADIOLOGY | Facility: HOSPITAL | Age: 73
Discharge: HOME | End: 2018-09-19
Attending: UROLOGY
Payer: MEDICARE

## 2018-09-19 DIAGNOSIS — N28.1 RENAL CYST: ICD-10-CM

## 2018-09-19 PROCEDURE — 76770 US EXAM ABDO BACK WALL COMP: CPT | Mod: 26,,, | Performed by: RADIOLOGY

## 2018-09-19 PROCEDURE — 76770 US EXAM ABDO BACK WALL COMP: CPT | Mod: TC

## 2018-09-19 NOTE — TELEPHONE ENCOUNTER
Left message of U/S results and instructed MD will discuss at up coming appointment tarik castillo lpn

## 2018-09-24 ENCOUNTER — PATIENT OUTREACH (OUTPATIENT)
Dept: ADMINISTRATIVE | Facility: HOSPITAL | Age: 73
End: 2018-09-24

## 2018-09-24 NOTE — PROGRESS NOTES
Patient was contacted to scheduled visit with Dr Méndez. Attempt unsuccessful,will follow up with patient at a later time.     Left message for patient to contact office to schedule annual exam.

## 2018-10-03 ENCOUNTER — OFFICE VISIT (OUTPATIENT)
Dept: UROLOGY | Facility: CLINIC | Age: 73
End: 2018-10-03
Payer: MEDICARE

## 2018-10-03 VITALS
SYSTOLIC BLOOD PRESSURE: 107 MMHG | DIASTOLIC BLOOD PRESSURE: 59 MMHG | BODY MASS INDEX: 25.4 KG/M2 | HEART RATE: 71 BPM | WEIGHT: 138.88 LBS

## 2018-10-03 DIAGNOSIS — N28.1 COMPLEX RENAL CYST: Primary | ICD-10-CM

## 2018-10-03 PROCEDURE — 3074F SYST BP LT 130 MM HG: CPT | Mod: CPTII,,, | Performed by: UROLOGY

## 2018-10-03 PROCEDURE — 99999 PR PBB SHADOW E&M-EST. PATIENT-LVL III: CPT | Mod: PBBFAC,,, | Performed by: UROLOGY

## 2018-10-03 PROCEDURE — 99213 OFFICE O/P EST LOW 20 MIN: CPT | Mod: PBBFAC | Performed by: UROLOGY

## 2018-10-03 PROCEDURE — 1101F PT FALLS ASSESS-DOCD LE1/YR: CPT | Mod: CPTII,,, | Performed by: UROLOGY

## 2018-10-03 PROCEDURE — 3078F DIAST BP <80 MM HG: CPT | Mod: CPTII,,, | Performed by: UROLOGY

## 2018-10-03 PROCEDURE — 99213 OFFICE O/P EST LOW 20 MIN: CPT | Mod: S$PBB,,, | Performed by: UROLOGY

## 2018-10-03 NOTE — PROGRESS NOTES
Subjective:       Patient ID: Rex Song is a 73 y.o. male.    Chief Complaint:  Renal cyst.      History of Present Illness  HPI  Patient is a 73 y.o. male who was referred by his PCP, Dr. Borrero for evaluation of renal cysts.   His serum Cr most recently was reviewed and was 2.3 (eGFR 27) on 9/19/18 compared to Cr 2.3 (eGFR 26.8) on 1/31/18.  He is being seen by nephrology, Dr. Lyn.   He has a h/o CKD stage 4 as well as CAD.  He had an MI in the 80's and is on plavix, aspirin, and pletal.  He denies any chest pain or shortness of breath.      He denies any new medical problems. He denies abdominal pain, flank pain, hematuria, difficulty voiding.         Review of Systems  Review of Systems  All other systems reviewed and negative except pertinent positives noted in HPI.       Objective:     Physical Exam   Nursing note and vitals reviewed.  Constitutional: He is oriented to person, place, and time. Vital signs are normal. He appears well-developed and well-nourished. He is cooperative.   HENT:   Head: Normocephalic.   Neck: No tracheal deviation present.   Cardiovascular: Normal rate and intact distal pulses.    Pulmonary/Chest: Effort normal. No accessory muscle usage. No tachypnea. No respiratory distress.   Abdominal: Soft. Normal appearance. He exhibits no distension, no fluid wave, no ascites and no mass. There is no tenderness. There is no rebound and no CVA tenderness. No hernia. Hernia confirmed negative in the right inguinal area and confirmed negative in the left inguinal area.   Liver/spleen non-palpable.   Genitourinary: Testes normal. Rectal exam shows no external hemorrhoid, no mass, no tenderness and anal tone normal. Prostate is not tender. Right testis shows no mass and no tenderness. Right testis is descended. Left testis shows no mass and no tenderness. Left testis is descended. Uncircumcised. Phimosis present.   Genitourinary Comments: vin deferred       Musculoskeletal: Normal range  of motion.   Lymphadenopathy: No inguinal adenopathy noted on the right or left side.        Right: No inguinal adenopathy present.        Left: No inguinal adenopathy present.   Neurological: He is alert and oriented to person, place, and time. He has normal strength.   Skin: No bruising and no rash noted.     Psychiatric: He has a normal mood and affect. His speech is normal and behavior is normal. Thought content normal.       Lab Review  Lab Results   Component Value Date    COLORU Yellow 02/07/2018    SPECGRAV 1.020 02/07/2018    PHUR 5.0 02/07/2018    NITRITE Negative 02/07/2018    KETONESU Negative 02/07/2018    UROBILINOGEN 2.0 02/07/2018         Assessment:        1. Complex renal cyst            Plan:     Complex renal cyst  -     US Retroperitoneal Complete (Kidney and; Future; Expected date: 04/03/2019      -complex renal cyst---explained implications to the patient.  His renal function would not allow IV contrasted CT scan.  However cyst stable since 2005 and 1/2018 per radiology.  Will recheck renal US in 6 months.   I spent 15 minutes with the patient of which more than half was spent in direct consultation with the patient in regards to our treatment and plan.

## 2018-12-17 RX ORDER — ROSUVASTATIN CALCIUM 40 MG/1
40 TABLET, COATED ORAL DAILY
Qty: 90 TABLET | Refills: 1 | Status: SHIPPED | OUTPATIENT
Start: 2018-12-17 | End: 2019-06-19 | Stop reason: SDUPTHER

## 2019-01-16 DIAGNOSIS — I10 ESSENTIAL HYPERTENSION: ICD-10-CM

## 2019-01-16 DIAGNOSIS — I25.10 CORONARY ARTERY DISEASE INVOLVING NATIVE CORONARY ARTERY WITHOUT ANGINA PECTORIS, UNSPECIFIED WHETHER NATIVE OR TRANSPLANTED HEART: ICD-10-CM

## 2019-01-16 RX ORDER — METOPROLOL SUCCINATE 50 MG/1
TABLET, EXTENDED RELEASE ORAL
Qty: 90 TABLET | Refills: 3 | OUTPATIENT
Start: 2019-01-16

## 2019-01-19 RX ORDER — AMLODIPINE BESYLATE 10 MG/1
TABLET ORAL
Qty: 90 TABLET | Refills: 3 | Status: SHIPPED | OUTPATIENT
Start: 2019-01-19 | End: 2020-03-12 | Stop reason: SDUPTHER

## 2019-01-19 RX ORDER — LISINOPRIL 40 MG/1
TABLET ORAL
Qty: 90 TABLET | Refills: 3 | Status: SHIPPED | OUTPATIENT
Start: 2019-01-19 | End: 2019-05-08

## 2019-02-25 ENCOUNTER — PES CALL (OUTPATIENT)
Dept: ADMINISTRATIVE | Facility: CLINIC | Age: 74
End: 2019-02-25

## 2019-02-25 DIAGNOSIS — I10 ESSENTIAL HYPERTENSION: ICD-10-CM

## 2019-02-25 DIAGNOSIS — I25.10 CORONARY ARTERY DISEASE INVOLVING NATIVE CORONARY ARTERY WITHOUT ANGINA PECTORIS, UNSPECIFIED WHETHER NATIVE OR TRANSPLANTED HEART: ICD-10-CM

## 2019-02-25 RX ORDER — METOPROLOL SUCCINATE 50 MG/1
TABLET, EXTENDED RELEASE ORAL
Qty: 90 TABLET | Refills: 0 | Status: SHIPPED | OUTPATIENT
Start: 2019-02-25 | End: 2019-06-26 | Stop reason: SDUPTHER

## 2019-04-03 ENCOUNTER — HOSPITAL ENCOUNTER (OUTPATIENT)
Dept: RADIOLOGY | Facility: HOSPITAL | Age: 74
Discharge: HOME | End: 2019-04-03
Attending: STUDENT IN AN ORGANIZED HEALTH CARE EDUCATION/TRAINING PROGRAM
Payer: MEDICARE

## 2019-04-03 ENCOUNTER — OFFICE VISIT (OUTPATIENT)
Dept: UROLOGY | Facility: CLINIC | Age: 74
End: 2019-04-03
Payer: MEDICARE

## 2019-04-03 VITALS
SYSTOLIC BLOOD PRESSURE: 96 MMHG | WEIGHT: 147.5 LBS | HEART RATE: 65 BPM | HEIGHT: 62 IN | BODY MASS INDEX: 27.14 KG/M2 | DIASTOLIC BLOOD PRESSURE: 55 MMHG

## 2019-04-03 DIAGNOSIS — N28.1 COMPLEX RENAL CYST: ICD-10-CM

## 2019-04-03 DIAGNOSIS — N28.1 RENAL CYST: Primary | ICD-10-CM

## 2019-04-03 PROCEDURE — 1101F PR PT FALLS ASSESS DOC 0-1 FALLS W/OUT INJ PAST YR: ICD-10-PCS | Mod: HCNC,CPTII,S$GLB, | Performed by: UROLOGY

## 2019-04-03 PROCEDURE — 99213 PR OFFICE/OUTPT VISIT, EST, LEVL III, 20-29 MIN: ICD-10-PCS | Mod: HCNC,S$GLB,, | Performed by: UROLOGY

## 2019-04-03 PROCEDURE — 3074F PR MOST RECENT SYSTOLIC BLOOD PRESSURE < 130 MM HG: ICD-10-PCS | Mod: HCNC,CPTII,S$GLB, | Performed by: UROLOGY

## 2019-04-03 PROCEDURE — 76770 US RETROPERITONEAL COMPLETE: ICD-10-PCS | Mod: 26,HCNC,, | Performed by: RADIOLOGY

## 2019-04-03 PROCEDURE — 1101F PT FALLS ASSESS-DOCD LE1/YR: CPT | Mod: HCNC,CPTII,S$GLB, | Performed by: UROLOGY

## 2019-04-03 PROCEDURE — 76770 US EXAM ABDO BACK WALL COMP: CPT | Mod: 26,HCNC,, | Performed by: RADIOLOGY

## 2019-04-03 PROCEDURE — 76770 US EXAM ABDO BACK WALL COMP: CPT | Mod: TC,HCNC

## 2019-04-03 PROCEDURE — 3078F PR MOST RECENT DIASTOLIC BLOOD PRESSURE < 80 MM HG: ICD-10-PCS | Mod: HCNC,CPTII,S$GLB, | Performed by: UROLOGY

## 2019-04-03 PROCEDURE — 99999 PR PBB SHADOW E&M-EST. PATIENT-LVL III: CPT | Mod: PBBFAC,HCNC,, | Performed by: UROLOGY

## 2019-04-03 PROCEDURE — 3078F DIAST BP <80 MM HG: CPT | Mod: HCNC,CPTII,S$GLB, | Performed by: UROLOGY

## 2019-04-03 PROCEDURE — 3074F SYST BP LT 130 MM HG: CPT | Mod: HCNC,CPTII,S$GLB, | Performed by: UROLOGY

## 2019-04-03 PROCEDURE — 99213 OFFICE O/P EST LOW 20 MIN: CPT | Mod: HCNC,S$GLB,, | Performed by: UROLOGY

## 2019-04-03 PROCEDURE — 99999 PR PBB SHADOW E&M-EST. PATIENT-LVL III: ICD-10-PCS | Mod: PBBFAC,HCNC,, | Performed by: UROLOGY

## 2019-04-03 NOTE — PROGRESS NOTES
Subjective:       Patient ID: Rex Song is a 74 y.o. male.    Chief Complaint:  Renal cyst.      History of Present Illness  HPI  Patient is a 74 y.o. male who was referred by his PCP, Dr. Borrero for evaluation of renal cysts.   His serum Cr most recently was reviewed and was 2.3 (eGFR 27) on 9/19/18 compared to Cr 2.3 (eGFR 26.8) on 1/31/18.  He has not been seen by nephrology (Dr. Lyn) since 2/2018.  He has a h/o CKD stage 4 as well as CAD.  He had an MI in the 80's and is on plavix, aspirin, and pletal.  He denies any chest pain or shortness of breath.      He denies any new medical problems. He denies abdominal pain, flank pain, hematuria, difficulty voiding.           Review of Systems  Review of Systems  All other systems reviewed and negative except pertinent positives noted in HPI.       Objective:     Physical Exam   Constitutional: He is oriented to person, place, and time. He appears well-developed and well-nourished. No distress.   HENT:   Head: Normocephalic and atraumatic.   Eyes: No scleral icterus.   Neck: No tracheal deviation present.   Pulmonary/Chest: Effort normal. No respiratory distress.   Neurological: He is alert and oriented to person, place, and time.   Psychiatric: He has a normal mood and affect. His behavior is normal. Judgment and thought content normal.       Lab Review  Lab Results   Component Value Date    COLORU Yellow 02/07/2018    SPECGRAV 1.020 02/07/2018    PHUR 5.0 02/07/2018    NITRITE Negative 02/07/2018    KETONESU Negative 02/07/2018    UROBILINOGEN 2.0 02/07/2018         Assessment:        1. Renal cyst            Plan:     Renal cyst  -     US Retroperitoneal Complete (Kidney and; Future; Expected date: 10/03/2019      -complex renal cyst---again explained implications to the patient.    Will recheck renal US in 6 months to ensure stability.   I spent 15 minutes with the patient of which more than half was spent in direct consultation with the patient in  regards to our treatment and plan.

## 2019-04-10 DIAGNOSIS — I25.10 CAD (CORONARY ARTERY DISEASE): ICD-10-CM

## 2019-04-10 DIAGNOSIS — I25.10 CORONARY ARTERY DISEASE, ANGINA PRESENCE UNSPECIFIED, UNSPECIFIED VESSEL OR LESION TYPE, UNSPECIFIED WHETHER NATIVE OR TRANSPLANTED HEART: Primary | ICD-10-CM

## 2019-05-08 ENCOUNTER — TELEPHONE (OUTPATIENT)
Dept: CARDIOLOGY | Facility: CLINIC | Age: 74
End: 2019-05-08

## 2019-05-08 ENCOUNTER — HOSPITAL ENCOUNTER (OUTPATIENT)
Dept: CARDIOLOGY | Facility: CLINIC | Age: 74
Discharge: HOME | End: 2019-05-08
Payer: MEDICARE

## 2019-05-08 ENCOUNTER — OFFICE VISIT (OUTPATIENT)
Dept: CARDIOLOGY | Facility: CLINIC | Age: 74
End: 2019-05-08
Payer: MEDICARE

## 2019-05-08 VITALS
DIASTOLIC BLOOD PRESSURE: 68 MMHG | BODY MASS INDEX: 23.53 KG/M2 | HEIGHT: 66 IN | WEIGHT: 146.38 LBS | HEART RATE: 62 BPM | SYSTOLIC BLOOD PRESSURE: 116 MMHG

## 2019-05-08 DIAGNOSIS — I10 ESSENTIAL HYPERTENSION: Chronic | ICD-10-CM

## 2019-05-08 DIAGNOSIS — I73.9 PVD (PERIPHERAL VASCULAR DISEASE): Chronic | ICD-10-CM

## 2019-05-08 DIAGNOSIS — I25.10 CORONARY ARTERY DISEASE DUE TO LIPID RICH PLAQUE: Primary | Chronic | ICD-10-CM

## 2019-05-08 DIAGNOSIS — E78.2 MIXED HYPERLIPIDEMIA: Chronic | ICD-10-CM

## 2019-05-08 DIAGNOSIS — I25.10 CORONARY ARTERY DISEASE, ANGINA PRESENCE UNSPECIFIED, UNSPECIFIED VESSEL OR LESION TYPE, UNSPECIFIED WHETHER NATIVE OR TRANSPLANTED HEART: ICD-10-CM

## 2019-05-08 DIAGNOSIS — I25.83 CORONARY ARTERY DISEASE DUE TO LIPID RICH PLAQUE: Primary | Chronic | ICD-10-CM

## 2019-05-08 DIAGNOSIS — I71.40 ABDOMINAL AORTIC ANEURYSM (AAA) WITHOUT RUPTURE: ICD-10-CM

## 2019-05-08 DIAGNOSIS — N18.4 CKD (CHRONIC KIDNEY DISEASE) STAGE 4, GFR 15-29 ML/MIN: ICD-10-CM

## 2019-05-08 DIAGNOSIS — Z98.61 POST PTCA: Chronic | ICD-10-CM

## 2019-05-08 DIAGNOSIS — I25.10 CAD (CORONARY ARTERY DISEASE): ICD-10-CM

## 2019-05-08 DIAGNOSIS — Z72.0 TOBACCO ABUSE: Chronic | ICD-10-CM

## 2019-05-08 PROCEDURE — 99214 OFFICE O/P EST MOD 30 MIN: CPT | Mod: HCNC,S$GLB,, | Performed by: INTERNAL MEDICINE

## 2019-05-08 PROCEDURE — 99499 UNLISTED E&M SERVICE: CPT | Mod: S$GLB,,, | Performed by: INTERNAL MEDICINE

## 2019-05-08 PROCEDURE — 3074F PR MOST RECENT SYSTOLIC BLOOD PRESSURE < 130 MM HG: ICD-10-PCS | Mod: HCNC,CPTII,S$GLB, | Performed by: INTERNAL MEDICINE

## 2019-05-08 PROCEDURE — 93000 ELECTROCARDIOGRAM COMPLETE: CPT | Mod: HCNC,S$GLB,, | Performed by: INTERNAL MEDICINE

## 2019-05-08 PROCEDURE — 1101F PR PT FALLS ASSESS DOC 0-1 FALLS W/OUT INJ PAST YR: ICD-10-PCS | Mod: HCNC,CPTII,S$GLB, | Performed by: INTERNAL MEDICINE

## 2019-05-08 PROCEDURE — 99999 PR PBB SHADOW E&M-EST. PATIENT-LVL III: ICD-10-PCS | Mod: PBBFAC,HCNC,, | Performed by: INTERNAL MEDICINE

## 2019-05-08 PROCEDURE — 99999 PR PBB SHADOW E&M-EST. PATIENT-LVL III: CPT | Mod: PBBFAC,HCNC,, | Performed by: INTERNAL MEDICINE

## 2019-05-08 PROCEDURE — 99214 PR OFFICE/OUTPT VISIT, EST, LEVL IV, 30-39 MIN: ICD-10-PCS | Mod: HCNC,S$GLB,, | Performed by: INTERNAL MEDICINE

## 2019-05-08 PROCEDURE — 3078F PR MOST RECENT DIASTOLIC BLOOD PRESSURE < 80 MM HG: ICD-10-PCS | Mod: HCNC,CPTII,S$GLB, | Performed by: INTERNAL MEDICINE

## 2019-05-08 PROCEDURE — 99499 RISK ADDL DX/OHS AUDIT: ICD-10-PCS | Mod: S$GLB,,, | Performed by: INTERNAL MEDICINE

## 2019-05-08 PROCEDURE — 3078F DIAST BP <80 MM HG: CPT | Mod: HCNC,CPTII,S$GLB, | Performed by: INTERNAL MEDICINE

## 2019-05-08 PROCEDURE — 1101F PT FALLS ASSESS-DOCD LE1/YR: CPT | Mod: HCNC,CPTII,S$GLB, | Performed by: INTERNAL MEDICINE

## 2019-05-08 PROCEDURE — 93000 EKG 12-LEAD: ICD-10-PCS | Mod: HCNC,S$GLB,, | Performed by: INTERNAL MEDICINE

## 2019-05-08 PROCEDURE — 3074F SYST BP LT 130 MM HG: CPT | Mod: HCNC,CPTII,S$GLB, | Performed by: INTERNAL MEDICINE

## 2019-05-08 RX ORDER — IRBESARTAN 300 MG/1
300 TABLET ORAL NIGHTLY
Qty: 90 TABLET | Refills: 3 | Status: SHIPPED | OUTPATIENT
Start: 2019-05-08 | End: 2020-05-20

## 2019-05-08 RX ORDER — CLOPIDOGREL BISULFATE 75 MG/1
75 TABLET ORAL DAILY
Qty: 90 TABLET | Refills: 3 | Status: SHIPPED | OUTPATIENT
Start: 2019-05-08 | End: 2020-05-14

## 2019-05-08 NOTE — PROGRESS NOTES
Subjective:   Patient ID:  Rex Song is a 74 y.o. male who presents for follow-up of Coronary artery disease, angina presence unspecified, unspec (1 yr fu)      HPI:   Rex Song presents for follow up of coronary artery and peripheral vascular disease. He has had prior PCI with no c/o chest pain but does have some REBOLLEDO ( ongoing smoking ). He now notes that he is only able to walk 1/2 block before he has to stop for leg pains. Rex Song has hypertension with good control. Rex Song has dyslipidemia  on high intensity statin due for a recheck. Rex Song has an abdominal aortic aneurysm that has been small but not checked in the past 3 years..  Review of Systems   Constitution: Negative for malaise/fatigue, weight gain and weight loss.   Eyes: Negative for blurred vision.   Cardiovascular: Positive for claudication and dyspnea on exertion. Negative for chest pain, cyanosis, irregular heartbeat, leg swelling, near-syncope, orthopnea, palpitations, paroxysmal nocturnal dyspnea and syncope.   Respiratory: Negative for cough, shortness of breath and wheezing.    Musculoskeletal: Negative for falls and myalgias.   Gastrointestinal: Negative for abdominal pain, heartburn, nausea and vomiting.   Genitourinary: Negative for nocturia.   Neurological: Negative for brief paralysis, dizziness, focal weakness, headaches, numbness, paresthesias and weakness.   Psychiatric/Behavioral: Negative for altered mental status.       Current Outpatient Medications   Medication Sig    amLODIPine (NORVASC) 10 MG tablet TAKE 1 TABLET ONE TIME DAILY    aspirin (ECOTRIN) 81 MG EC tablet Take 81 mg by mouth. 1 Tablet, Delayed Release (E.C.) Oral At bedtime    chlorthalidone (HYGROTEN) 25 MG Tab TAKE 1 TABLET ONE TIME DAILY    cilostazol (PLETAL) 100 MG Tab TAKE 1 TABLET (100 MG TOTAL) BY MOUTH 2 (TWO) TIMES DAILY BEFORE MEALS.    clopidogrel (PLAVIX) 75 mg tablet Take 1 tablet (75 mg total) by mouth  "once daily.    irbesartan (AVAPRO) 300 MG tablet Take 1 tablet (300 mg total) by mouth every evening.    metoprolol succinate (TOPROL-XL) 50 MG 24 hr tablet TAKE 1 TABLET EVERY DAY    nitroGLYCERIN (NITROSTAT) 0.4 MG SL tablet Place 1 tablet (0.4 mg total) under the tongue every 5 (five) minutes as needed.    omeprazole (PRILOSEC) 40 MG capsule TAKE 1 CAPSULE EVERY MORNING    rosuvastatin (CRESTOR) 40 MG Tab TAKE 1 TABLET (40 MG TOTAL) BY MOUTH ONCE DAILY.     No current facility-administered medications for this visit.      Objective:   Physical Exam   Constitutional: He is oriented to person, place, and time. He appears well-developed. No distress.   /68 (BP Location: Left arm, Patient Position: Sitting, BP Method: Large (Automatic))   Pulse 62   Ht 5' 6" (1.676 m)   Wt 66.4 kg (146 lb 6.2 oz)   BMI 23.63 kg/m²    HENT:   Head: Normocephalic.   Right Ear: External ear normal.   Left Ear: External ear normal.   Eyes: Pupils are equal, round, and reactive to light. EOM are normal. No scleral icterus.   Neck: Neck supple. No JVD present. No thyromegaly present.   Cardiovascular: Normal rate, regular rhythm, normal heart sounds and intact distal pulses. PMI is not displaced. Exam reveals no gallop and no friction rub.   No murmur heard.  Pulses:       Femoral pulses are 1+ on the right side, and 2+ on the left side.       Dorsalis pedis pulses are 0 on the right side, and 0 on the left side.        Posterior tibial pulses are 0 on the right side, and 0 on the left side.   Heart tones more distant.   Pulmonary/Chest: Effort normal. No respiratory distress. He has wheezes. He has no rales.   Abdominal: Soft. He exhibits no distension. There is no hepatosplenomegaly. There is no tenderness.   Musculoskeletal: He exhibits no edema or tenderness.   Gait normal   Neurological: He is alert and oriented to person, place, and time.   Skin: Skin is warm and dry. No rash noted.   Psychiatric: He has a normal mood " "and affect. His behavior is normal.       Lab Results   Component Value Date     09/19/2018    K 3.8 09/19/2018     09/19/2018    CO2 28 09/19/2018    BUN 25 (H) 09/19/2018    CREATININE 2.3 (H) 09/19/2018    GLU 90 09/19/2018    MG 2.3 09/15/2010    AST 14 09/19/2018    ALT 10 09/19/2018    ALBUMIN 3.8 09/19/2018    PROT 7.3 09/19/2018    BILITOT 0.5 09/19/2018    WBC 7.68 01/31/2018    HGB 15.3 01/31/2018    HCT 47.0 01/31/2018    MCV 86 01/31/2018     01/31/2018    TSH 2.400 06/03/2015    CHOL 111 (L) 10/18/2017    HDL 32 (L) 10/18/2017    LDLCALC 55.4 (L) 10/18/2017    TRIG 118 10/18/2017       Assessment:     1. Coronary artery disease due to lipid rich plaque : Stable   2. Post PTCA    3. Essential hypertension : Good control.   4. Mixed hyperlipidemia :  on high intensity statin   5. Abdominal aortic aneurysm (AAA) without rupture : Small in the past   6. Tobacco abuse : Ongoing   7. CKD (chronic kidney disease) stage 4, GFR 15-29 ml/min    8. PVD (peripheral vascular disease) : Decreased distance to claudication       Plan:     Rex was seen today for coronary artery disease, angina presence unspecified, unspec.    Diagnoses and all orders for this visit:    Coronary artery disease due to lipid rich plaque    Post PTCA    Essential hypertension  -     irbesartan (AVAPRO) 300 MG tablet; Take 1 tablet (300 mg total) by mouth every evening ( in place of ACE)  .    Mixed hyperlipidemia  -     Lipid panel; Future; Expected date: 05/08/2019  Continue current regimen    Abdominal aortic aneurysm (AAA) without rupture  -     CV AAA Screening; Future    Tobacco abuse  Discussed ramifications of ongoing smoking r.e PAD and ultimate risk of limb loss.States he will "quit". Offered but refused smoking cessation program.  CKD (chronic kidney disease) stage 4, GFR 15-29 ml/min  -     Basic metabolic panel; Future; Expected date: 05/08/2019    PVD (peripheral vascular disease)  -     clopidogrel " (PLAVIX) 75 mg tablet; Take 1 tablet (75 mg total) by mouth once daily.( added)

## 2019-05-08 NOTE — TELEPHONE ENCOUNTER
----- Message from Mayank Borrero MD sent at 5/8/2019  3:00 PM CDT -----  Please inform the patient that his cholesterol readings are acceptable and his kidney function is some better.

## 2019-05-15 ENCOUNTER — TELEPHONE (OUTPATIENT)
Dept: CARDIOLOGY | Facility: CLINIC | Age: 74
End: 2019-05-15

## 2019-05-15 ENCOUNTER — CLINICAL SUPPORT (OUTPATIENT)
Dept: CARDIOLOGY | Facility: CLINIC | Age: 74
End: 2019-05-15
Attending: INTERNAL MEDICINE
Payer: MEDICARE

## 2019-05-15 DIAGNOSIS — I71.40 ABDOMINAL AORTIC ANEURYSM (AAA) WITHOUT RUPTURE: ICD-10-CM

## 2019-05-15 LAB
ABDOMINAL IMA AP: 3.7 CM
ABDOMINAL IMA ED VEL: 0 CM/S
ABDOMINAL IMA PS VEL: 27 CM/S
ABDOMINAL IMA TRANS: 3.9 CM
ABDOMINAL INFRARENAL AORTA AP: 3.3 CM
ABDOMINAL INFRARENAL AORTA ED VEL: 0 CM/S
ABDOMINAL INFRARENAL AORTA PS VEL: 34 CM/S
ABDOMINAL INFRARENAL AORTA TRANS: 3.2 CM
ABDOMINAL JUXTARENAL AORTA AP: 2.2 CM
ABDOMINAL JUXTARENAL AORTA ED VEL: 12 CM/S
ABDOMINAL JUXTARENAL AORTA PS VEL: 64 CM/S
ABDOMINAL JUXTARENAL AORTA TRANS: 2 CM
ABDOMINAL LT COM ILIAC AP: 1.7 CM
ABDOMINAL LT COM ILIAC TRANS: 1 CM
ABDOMINAL LT COM ILIAC VEL: 180 CM/S
ABDOMINAL LT COM ILLIAC ED VEL: 0 CM/S
ABDOMINAL RT COM ILIAC AP: 1.3 CM
ABDOMINAL RT COM ILIAC TRANS: 1.3 CM
ABDOMINAL RT COM ILIAC VEL: 203 CM/S
ABDOMINAL RT COM ILLIAC ED VEL: 0 CM/S
ABDOMINAL SUPRARENAL AORTA AP: 2.1 CM
ABDOMINAL SUPRARENAL AORTA ED VEL: 12 CM/S
ABDOMINAL SUPRARENAL AORTA PS VEL: 64 CM/S
ABDOMINAL SUPRARENAL AORTA TRANS: 2 CM

## 2019-05-15 PROCEDURE — 93978 CV US ABDOMINAL AORTA EVALUATION (CUPID ONLY): ICD-10-PCS | Mod: HCNC,S$GLB,, | Performed by: INTERNAL MEDICINE

## 2019-05-15 PROCEDURE — 93978 VASCULAR STUDY: CPT | Mod: HCNC,S$GLB,, | Performed by: INTERNAL MEDICINE

## 2019-05-15 NOTE — TELEPHONE ENCOUNTER
----- Message from Mayank Borrero MD sent at 5/15/2019  3:08 PM CDT -----  Please inform the patient the aneurysm is no larger than the last check

## 2019-06-06 ENCOUNTER — PES CALL (OUTPATIENT)
Dept: ADMINISTRATIVE | Facility: CLINIC | Age: 74
End: 2019-06-06

## 2019-06-19 RX ORDER — ROSUVASTATIN CALCIUM 40 MG/1
40 TABLET, COATED ORAL DAILY
Qty: 90 TABLET | Refills: 3 | Status: SHIPPED | OUTPATIENT
Start: 2019-06-19 | End: 2020-06-17

## 2019-06-26 DIAGNOSIS — I10 ESSENTIAL HYPERTENSION: ICD-10-CM

## 2019-06-26 DIAGNOSIS — I25.10 CORONARY ARTERY DISEASE INVOLVING NATIVE CORONARY ARTERY WITHOUT ANGINA PECTORIS, UNSPECIFIED WHETHER NATIVE OR TRANSPLANTED HEART: ICD-10-CM

## 2019-06-27 RX ORDER — METOPROLOL SUCCINATE 50 MG/1
TABLET, EXTENDED RELEASE ORAL
Qty: 90 TABLET | Refills: 0 | Status: SHIPPED | OUTPATIENT
Start: 2019-06-27 | End: 2019-10-30 | Stop reason: SDUPTHER

## 2019-07-29 RX ORDER — OMEPRAZOLE 40 MG/1
CAPSULE, DELAYED RELEASE ORAL
Qty: 90 CAPSULE | Refills: 3 | Status: SHIPPED | OUTPATIENT
Start: 2019-07-29 | End: 2020-07-20

## 2019-10-30 DIAGNOSIS — I25.10 CORONARY ARTERY DISEASE INVOLVING NATIVE CORONARY ARTERY WITHOUT ANGINA PECTORIS, UNSPECIFIED WHETHER NATIVE OR TRANSPLANTED HEART: ICD-10-CM

## 2019-10-30 DIAGNOSIS — I10 ESSENTIAL HYPERTENSION: ICD-10-CM

## 2019-10-30 RX ORDER — METOPROLOL SUCCINATE 50 MG/1
TABLET, EXTENDED RELEASE ORAL
Qty: 90 TABLET | Refills: 0 | Status: SHIPPED | OUTPATIENT
Start: 2019-10-30 | End: 2020-02-14

## 2019-11-06 ENCOUNTER — OFFICE VISIT (OUTPATIENT)
Dept: UROLOGY | Facility: CLINIC | Age: 74
End: 2019-11-06
Payer: MEDICARE

## 2019-11-06 ENCOUNTER — HOSPITAL ENCOUNTER (OUTPATIENT)
Dept: RADIOLOGY | Facility: HOSPITAL | Age: 74
Discharge: HOME | End: 2019-11-06
Attending: UROLOGY
Payer: MEDICARE

## 2019-11-06 VITALS
WEIGHT: 145.5 LBS | SYSTOLIC BLOOD PRESSURE: 106 MMHG | DIASTOLIC BLOOD PRESSURE: 63 MMHG | HEIGHT: 63 IN | BODY MASS INDEX: 25.78 KG/M2 | HEART RATE: 83 BPM

## 2019-11-06 DIAGNOSIS — N28.1 RENAL CYST: ICD-10-CM

## 2019-11-06 DIAGNOSIS — N28.1 COMPLEX RENAL CYST: Primary | ICD-10-CM

## 2019-11-06 PROCEDURE — 1101F PR PT FALLS ASSESS DOC 0-1 FALLS W/OUT INJ PAST YR: ICD-10-PCS | Mod: HCNC,CPTII,S$GLB, | Performed by: UROLOGY

## 2019-11-06 PROCEDURE — 99499 RISK ADDL DX/OHS AUDIT: ICD-10-PCS | Mod: S$GLB,,, | Performed by: UROLOGY

## 2019-11-06 PROCEDURE — 3074F SYST BP LT 130 MM HG: CPT | Mod: HCNC,CPTII,S$GLB, | Performed by: UROLOGY

## 2019-11-06 PROCEDURE — 1101F PT FALLS ASSESS-DOCD LE1/YR: CPT | Mod: HCNC,CPTII,S$GLB, | Performed by: UROLOGY

## 2019-11-06 PROCEDURE — 99999 PR PBB SHADOW E&M-EST. PATIENT-LVL III: CPT | Mod: PBBFAC,HCNC,, | Performed by: UROLOGY

## 2019-11-06 PROCEDURE — 99213 OFFICE O/P EST LOW 20 MIN: CPT | Mod: HCNC,S$GLB,, | Performed by: UROLOGY

## 2019-11-06 PROCEDURE — 76770 US EXAM ABDO BACK WALL COMP: CPT | Mod: 26,HCNC,, | Performed by: RADIOLOGY

## 2019-11-06 PROCEDURE — 3074F PR MOST RECENT SYSTOLIC BLOOD PRESSURE < 130 MM HG: ICD-10-PCS | Mod: HCNC,CPTII,S$GLB, | Performed by: UROLOGY

## 2019-11-06 PROCEDURE — 3078F PR MOST RECENT DIASTOLIC BLOOD PRESSURE < 80 MM HG: ICD-10-PCS | Mod: HCNC,CPTII,S$GLB, | Performed by: UROLOGY

## 2019-11-06 PROCEDURE — 99499 UNLISTED E&M SERVICE: CPT | Mod: S$GLB,,, | Performed by: UROLOGY

## 2019-11-06 PROCEDURE — 99213 PR OFFICE/OUTPT VISIT, EST, LEVL III, 20-29 MIN: ICD-10-PCS | Mod: HCNC,S$GLB,, | Performed by: UROLOGY

## 2019-11-06 PROCEDURE — 76770 US EXAM ABDO BACK WALL COMP: CPT | Mod: TC,HCNC

## 2019-11-06 PROCEDURE — 76770 US RETROPERITONEAL COMPLETE: ICD-10-PCS | Mod: 26,HCNC,, | Performed by: RADIOLOGY

## 2019-11-06 PROCEDURE — 3078F DIAST BP <80 MM HG: CPT | Mod: HCNC,CPTII,S$GLB, | Performed by: UROLOGY

## 2019-11-06 PROCEDURE — 99999 PR PBB SHADOW E&M-EST. PATIENT-LVL III: ICD-10-PCS | Mod: PBBFAC,HCNC,, | Performed by: UROLOGY

## 2019-11-06 NOTE — PROGRESS NOTES
Subjective:       Patient ID: Rex Song is a 74 y.o. male.    Chief Complaint:  Renal cyst.      History of Present Illness  HPI  Patient is a 74 y.o. male who was referred by his PCP, Dr. Borrero for evaluation of renal cysts.   His serum Cr most recently was reviewed and was 1.7 (eGFR 38.3) on 5/8/19 compared to Cr 2.3 (eGFR 27) on 9/19/18.  He has a h/o CKD stage 4 as well as CAD.  He had an MI in the 80's and is on plavix, aspirin, and pletal.  He denies any chest pain or shortness of breath.      He denies any new medical problems. He denies abdominal pain, flank pain, hematuria, difficulty voiding.   He is here to review a renal ultrasound which is being done for surveillance of a minimally complex renal cyst.          Review of Systems  Review of Systems  All other systems reviewed and negative except pertinent positives noted in HPI.       Objective:     Physical Exam   Constitutional: He is oriented to person, place, and time. He appears well-developed and well-nourished. No distress.   HENT:   Head: Normocephalic and atraumatic.   Eyes: No scleral icterus.   Neck: No tracheal deviation present.   Pulmonary/Chest: Effort normal. No respiratory distress.   Neurological: He is alert and oriented to person, place, and time.   Psychiatric: He has a normal mood and affect. His behavior is normal. Judgment and thought content normal.       Lab Review  Lab Results   Component Value Date    COLORU Yellow 02/07/2018    SPECGRAV 1.020 02/07/2018    PHUR 5.0 02/07/2018    NITRITE Negative 02/07/2018    KETONESU Negative 02/07/2018    UROBILINOGEN 2.0 02/07/2018         Assessment:        1. Complex renal cyst            Plan:     Complex renal cyst  -     US Retroperitoneal Complete (Kidney and; Future; Expected date: 05/06/2020      - minimally complex renal cyst---again explained implications to the patient.    Will recheck renal US in 6 months to ensure stability.  Given that the patient has had stability of  this renal cyst coupled with the very mild complexity of the cyst in addition to the fact that he is not a good surgical candidate, I think it is safe to continue monitoring this.  In addition, he likely will not progress to needing surgery.  I explained that he likely does not need a surgeon to follow this and therefore I asked that he follow up with 1 of our APPs moving forward.  He was agreeable to this.  He can be re-referred to me if any question of need for surgery for his renal cyst.    I spent 15 minutes with the patient of which more than half was spent in direct consultation with the patient in regards to our treatment and plan.

## 2020-02-13 DIAGNOSIS — I25.10 CORONARY ARTERY DISEASE INVOLVING NATIVE CORONARY ARTERY WITHOUT ANGINA PECTORIS, UNSPECIFIED WHETHER NATIVE OR TRANSPLANTED HEART: ICD-10-CM

## 2020-02-13 DIAGNOSIS — I10 ESSENTIAL HYPERTENSION: ICD-10-CM

## 2020-02-14 RX ORDER — METOPROLOL SUCCINATE 50 MG/1
TABLET, EXTENDED RELEASE ORAL
Qty: 90 TABLET | Refills: 0 | Status: SHIPPED | OUTPATIENT
Start: 2020-02-14 | End: 2020-05-11

## 2020-05-08 DIAGNOSIS — I25.10 CORONARY ARTERY DISEASE INVOLVING NATIVE CORONARY ARTERY WITHOUT ANGINA PECTORIS, UNSPECIFIED WHETHER NATIVE OR TRANSPLANTED HEART: ICD-10-CM

## 2020-05-08 DIAGNOSIS — I10 ESSENTIAL HYPERTENSION: ICD-10-CM

## 2020-05-11 RX ORDER — METOPROLOL SUCCINATE 50 MG/1
TABLET, EXTENDED RELEASE ORAL
Qty: 90 TABLET | Refills: 0 | Status: SHIPPED | OUTPATIENT
Start: 2020-05-11 | End: 2020-08-10

## 2020-05-13 DIAGNOSIS — I73.9 PVD (PERIPHERAL VASCULAR DISEASE): Chronic | ICD-10-CM

## 2020-05-14 RX ORDER — CLOPIDOGREL BISULFATE 75 MG/1
TABLET ORAL
Qty: 90 TABLET | Refills: 3 | Status: SHIPPED | OUTPATIENT
Start: 2020-05-14 | End: 2021-03-31

## 2020-05-20 DIAGNOSIS — I10 ESSENTIAL HYPERTENSION: Chronic | ICD-10-CM

## 2020-05-20 RX ORDER — IRBESARTAN 300 MG/1
TABLET ORAL
Qty: 90 TABLET | Refills: 3 | Status: SHIPPED | OUTPATIENT
Start: 2020-05-20 | End: 2021-05-25

## 2020-06-17 RX ORDER — ROSUVASTATIN CALCIUM 40 MG/1
40 TABLET, COATED ORAL DAILY
Qty: 90 TABLET | Refills: 3 | Status: SHIPPED | OUTPATIENT
Start: 2020-06-17 | End: 2022-05-12 | Stop reason: SDUPTHER

## 2020-08-08 DIAGNOSIS — I10 ESSENTIAL HYPERTENSION: ICD-10-CM

## 2020-08-08 DIAGNOSIS — I25.10 CORONARY ARTERY DISEASE INVOLVING NATIVE CORONARY ARTERY WITHOUT ANGINA PECTORIS, UNSPECIFIED WHETHER NATIVE OR TRANSPLANTED HEART: ICD-10-CM

## 2020-08-10 RX ORDER — METOPROLOL SUCCINATE 50 MG/1
TABLET, EXTENDED RELEASE ORAL
Qty: 90 TABLET | Refills: 0 | Status: SHIPPED | OUTPATIENT
Start: 2020-08-10 | End: 2021-04-07 | Stop reason: SDUPTHER

## 2020-10-13 DIAGNOSIS — I25.10 CORONARY ARTERY DISEASE INVOLVING NATIVE CORONARY ARTERY WITHOUT ANGINA PECTORIS, UNSPECIFIED WHETHER NATIVE OR TRANSPLANTED HEART: ICD-10-CM

## 2020-10-13 DIAGNOSIS — I10 ESSENTIAL HYPERTENSION: ICD-10-CM

## 2020-10-14 RX ORDER — METOPROLOL SUCCINATE 50 MG/1
TABLET, EXTENDED RELEASE ORAL
Qty: 90 TABLET | Refills: 0 | OUTPATIENT
Start: 2020-10-14

## 2021-03-16 ENCOUNTER — IMMUNIZATION (OUTPATIENT)
Dept: PHARMACY | Facility: CLINIC | Age: 76
End: 2021-03-16
Payer: MEDICARE

## 2021-03-16 DIAGNOSIS — Z23 NEED FOR VACCINATION: Primary | ICD-10-CM

## 2021-04-07 DIAGNOSIS — I25.10 CORONARY ARTERY DISEASE INVOLVING NATIVE CORONARY ARTERY WITHOUT ANGINA PECTORIS, UNSPECIFIED WHETHER NATIVE OR TRANSPLANTED HEART: ICD-10-CM

## 2021-04-07 DIAGNOSIS — I10 ESSENTIAL HYPERTENSION: ICD-10-CM

## 2021-04-07 RX ORDER — METOPROLOL SUCCINATE 50 MG/1
50 TABLET, EXTENDED RELEASE ORAL DAILY
Qty: 90 TABLET | Refills: 3 | Status: SHIPPED | OUTPATIENT
Start: 2021-04-07 | End: 2021-07-29 | Stop reason: SDUPTHER

## 2021-07-29 ENCOUNTER — OFFICE VISIT (OUTPATIENT)
Dept: CARDIOLOGY | Facility: CLINIC | Age: 76
End: 2021-07-29
Payer: MEDICARE

## 2021-07-29 VITALS
HEART RATE: 89 BPM | OXYGEN SATURATION: 96 % | BODY MASS INDEX: 26.39 KG/M2 | DIASTOLIC BLOOD PRESSURE: 71 MMHG | SYSTOLIC BLOOD PRESSURE: 120 MMHG | WEIGHT: 149 LBS

## 2021-07-29 DIAGNOSIS — I10 ESSENTIAL HYPERTENSION: ICD-10-CM

## 2021-07-29 DIAGNOSIS — I71.40 ABDOMINAL AORTIC ANEURYSM (AAA) WITHOUT RUPTURE: ICD-10-CM

## 2021-07-29 DIAGNOSIS — I48.91 ATRIAL FIBRILLATION, UNSPECIFIED TYPE: ICD-10-CM

## 2021-07-29 DIAGNOSIS — E78.5 HYPERLIPIDEMIA, UNSPECIFIED HYPERLIPIDEMIA TYPE: ICD-10-CM

## 2021-07-29 DIAGNOSIS — I73.9 PVD (PERIPHERAL VASCULAR DISEASE): Chronic | ICD-10-CM

## 2021-07-29 DIAGNOSIS — I73.9 PVD (PERIPHERAL VASCULAR DISEASE): ICD-10-CM

## 2021-07-29 DIAGNOSIS — I25.119 CORONARY ARTERY DISEASE INVOLVING NATIVE HEART WITH ANGINA PECTORIS, UNSPECIFIED VESSEL OR LESION TYPE: Primary | ICD-10-CM

## 2021-07-29 DIAGNOSIS — I25.10 CORONARY ARTERY DISEASE INVOLVING NATIVE CORONARY ARTERY WITHOUT ANGINA PECTORIS, UNSPECIFIED WHETHER NATIVE OR TRANSPLANTED HEART: ICD-10-CM

## 2021-07-29 PROCEDURE — 3074F SYST BP LT 130 MM HG: CPT | Mod: CPTII,S$GLB,, | Performed by: INTERNAL MEDICINE

## 2021-07-29 PROCEDURE — 93000 ELECTROCARDIOGRAM COMPLETE: CPT | Mod: S$GLB,,, | Performed by: INTERNAL MEDICINE

## 2021-07-29 PROCEDURE — 1159F MED LIST DOCD IN RCRD: CPT | Mod: CPTII,S$GLB,, | Performed by: INTERNAL MEDICINE

## 2021-07-29 PROCEDURE — 3078F DIAST BP <80 MM HG: CPT | Mod: CPTII,S$GLB,, | Performed by: INTERNAL MEDICINE

## 2021-07-29 PROCEDURE — 3074F PR MOST RECENT SYSTOLIC BLOOD PRESSURE < 130 MM HG: ICD-10-PCS | Mod: CPTII,S$GLB,, | Performed by: INTERNAL MEDICINE

## 2021-07-29 PROCEDURE — 1126F AMNT PAIN NOTED NONE PRSNT: CPT | Mod: CPTII,S$GLB,, | Performed by: INTERNAL MEDICINE

## 2021-07-29 PROCEDURE — 99499 RISK ADDL DX/OHS AUDIT: ICD-10-PCS | Mod: HCNC,S$GLB,, | Performed by: INTERNAL MEDICINE

## 2021-07-29 PROCEDURE — 99499 UNLISTED E&M SERVICE: CPT | Mod: HCNC,S$GLB,, | Performed by: INTERNAL MEDICINE

## 2021-07-29 PROCEDURE — 1126F PR PAIN SEVERITY QUANTIFIED, NO PAIN PRESENT: ICD-10-PCS | Mod: CPTII,S$GLB,, | Performed by: INTERNAL MEDICINE

## 2021-07-29 PROCEDURE — 99215 OFFICE O/P EST HI 40 MIN: CPT | Mod: 25,S$GLB,, | Performed by: INTERNAL MEDICINE

## 2021-07-29 PROCEDURE — 1160F RVW MEDS BY RX/DR IN RCRD: CPT | Mod: CPTII,S$GLB,, | Performed by: INTERNAL MEDICINE

## 2021-07-29 PROCEDURE — 93000 EKG 12-LEAD: ICD-10-PCS | Mod: S$GLB,,, | Performed by: INTERNAL MEDICINE

## 2021-07-29 PROCEDURE — 1160F PR REVIEW ALL MEDS BY PRESCRIBER/CLIN PHARMACIST DOCUMENTED: ICD-10-PCS | Mod: CPTII,S$GLB,, | Performed by: INTERNAL MEDICINE

## 2021-07-29 PROCEDURE — 1159F PR MEDICATION LIST DOCUMENTED IN MEDICAL RECORD: ICD-10-PCS | Mod: CPTII,S$GLB,, | Performed by: INTERNAL MEDICINE

## 2021-07-29 PROCEDURE — 99999 PR PBB SHADOW E&M-EST. PATIENT-LVL III: ICD-10-PCS | Mod: PBBFAC,,, | Performed by: INTERNAL MEDICINE

## 2021-07-29 PROCEDURE — 3078F PR MOST RECENT DIASTOLIC BLOOD PRESSURE < 80 MM HG: ICD-10-PCS | Mod: CPTII,S$GLB,, | Performed by: INTERNAL MEDICINE

## 2021-07-29 PROCEDURE — 99999 PR PBB SHADOW E&M-EST. PATIENT-LVL III: CPT | Mod: PBBFAC,,, | Performed by: INTERNAL MEDICINE

## 2021-07-29 PROCEDURE — 99215 PR OFFICE/OUTPT VISIT, EST, LEVL V, 40-54 MIN: ICD-10-PCS | Mod: 25,S$GLB,, | Performed by: INTERNAL MEDICINE

## 2021-07-29 RX ORDER — AMLODIPINE BESYLATE 10 MG/1
TABLET ORAL
Qty: 90 TABLET | Refills: 3 | Status: SHIPPED | OUTPATIENT
Start: 2021-07-29 | End: 2022-03-31

## 2021-07-29 RX ORDER — METOPROLOL SUCCINATE 50 MG/1
50 TABLET, EXTENDED RELEASE ORAL DAILY
Qty: 90 TABLET | Refills: 3 | Status: SHIPPED | OUTPATIENT
Start: 2021-07-29 | End: 2022-02-17

## 2021-07-29 RX ORDER — CLOPIDOGREL BISULFATE 75 MG/1
75 TABLET ORAL DAILY
Qty: 90 TABLET | Refills: 3 | Status: SHIPPED | OUTPATIENT
Start: 2021-07-29 | End: 2022-09-07

## 2021-08-11 ENCOUNTER — HOSPITAL ENCOUNTER (OUTPATIENT)
Dept: CARDIOLOGY | Facility: HOSPITAL | Age: 76
Discharge: HOME | End: 2021-08-11
Attending: INTERNAL MEDICINE
Payer: MEDICARE

## 2021-08-11 ENCOUNTER — HOSPITAL ENCOUNTER (OUTPATIENT)
Dept: RADIOLOGY | Facility: HOSPITAL | Age: 76
Discharge: HOME | End: 2021-08-11
Attending: INTERNAL MEDICINE
Payer: MEDICARE

## 2021-08-11 VITALS — WEIGHT: 149 LBS | HEIGHT: 63 IN | BODY MASS INDEX: 26.4 KG/M2

## 2021-08-11 DIAGNOSIS — I71.40 ABDOMINAL AORTIC ANEURYSM (AAA) WITHOUT RUPTURE: ICD-10-CM

## 2021-08-11 DIAGNOSIS — I48.91 ATRIAL FIBRILLATION, UNSPECIFIED TYPE: ICD-10-CM

## 2021-08-11 DIAGNOSIS — I50.1 HEART FAILURE, LEFT, WITH LVEF <=30%: ICD-10-CM

## 2021-08-11 DIAGNOSIS — I42.9 CARDIOMYOPATHY, UNSPECIFIED TYPE: Primary | ICD-10-CM

## 2021-08-11 LAB
AORTIC ROOT ANNULUS: 3.22 CM
ASCENDING AORTA: 2.76 CM
AV INDEX (PROSTH): 0.7
AV MEAN GRADIENT: 3 MMHG
AV PEAK GRADIENT: 5 MMHG
AV VALVE AREA: 2.17 CM2
AV VELOCITY RATIO: 0.68
BSA FOR ECHO PROCEDURE: 1.73 M2
CV ECHO LV RWT: 0.31 CM
DOP CALC AO PEAK VEL: 1.13 M/S
DOP CALC AO VTI: 20.04 CM
DOP CALC LVOT AREA: 3.1 CM2
DOP CALC LVOT DIAMETER: 1.98 CM
DOP CALC LVOT PEAK VEL: 0.77 M/S
DOP CALC LVOT STROKE VOLUME: 43.39 CM3
DOP CALCLVOT PEAK VEL VTI: 14.1 CM
ECHO LV POSTERIOR WALL: 0.87 CM (ref 0.6–1.1)
EJECTION FRACTION: 30 %
FRACTIONAL SHORTENING: 1 % (ref 28–44)
INTERVENTRICULAR SEPTUM: 0.89 CM (ref 0.6–1.1)
IVRT: 91.34 MSEC
LA MAJOR: 5.28 CM
LA MINOR: 6.62 CM
LA WIDTH: 3.56 CM
LEFT ATRIUM SIZE: 4.21 CM
LEFT ATRIUM VOLUME INDEX MOD: 34.2 ML/M2
LEFT ATRIUM VOLUME INDEX: 43.8 ML/M2
LEFT ATRIUM VOLUME MOD: 58.43 CM3
LEFT ATRIUM VOLUME: 74.84 CM3
LEFT INTERNAL DIMENSION IN SYSTOLE: 5.57 CM (ref 2.1–4)
LEFT VENTRICLE DIASTOLIC VOLUME INDEX: 90.5 ML/M2
LEFT VENTRICLE DIASTOLIC VOLUME: 154.75 ML
LEFT VENTRICLE MASS INDEX: 110 G/M2
LEFT VENTRICLE SYSTOLIC VOLUME INDEX: 88.8 ML/M2
LEFT VENTRICLE SYSTOLIC VOLUME: 151.86 ML
LEFT VENTRICULAR INTERNAL DIMENSION IN DIASTOLE: 5.62 CM (ref 3.5–6)
LEFT VENTRICULAR MASS: 187.34 G
PISA TR MAX VEL: 2.6 M/S
RA MAJOR: 5.62 CM
RA PRESSURE: 3 MMHG
RA WIDTH: 3.55 CM
RIGHT VENTRICULAR END-DIASTOLIC DIMENSION: 2.21 CM
RV TISSUE DOPPLER FREE WALL SYSTOLIC VELOCITY 1 (APICAL 4 CHAMBER VIEW): 7.7 CM/S
STJ: 2.59 CM
TR MAX PG: 27 MMHG
TRICUSPID ANNULAR PLANE SYSTOLIC EXCURSION: 1.8 CM
TV REST PULMONARY ARTERY PRESSURE: 30 MMHG

## 2021-08-11 PROCEDURE — 93227 HOLTER MONITOR - 24 HOUR (CUPID ONLY): ICD-10-PCS | Mod: ,,, | Performed by: INTERNAL MEDICINE

## 2021-08-11 PROCEDURE — 76775 US EXAM ABDO BACK WALL LIM: CPT | Mod: TC

## 2021-08-11 PROCEDURE — 93306 ECHO (CUPID ONLY): ICD-10-PCS | Mod: 26,,, | Performed by: INTERNAL MEDICINE

## 2021-08-11 PROCEDURE — 93306 TTE W/DOPPLER COMPLETE: CPT

## 2021-08-11 PROCEDURE — 76775 US ABDOMINAL AORTA: ICD-10-PCS | Mod: 26,,, | Performed by: RADIOLOGY

## 2021-08-11 PROCEDURE — 93226 XTRNL ECG REC<48 HR SCAN A/R: CPT

## 2021-08-11 PROCEDURE — 93306 TTE W/DOPPLER COMPLETE: CPT | Mod: 26,,, | Performed by: INTERNAL MEDICINE

## 2021-08-11 PROCEDURE — 93227 XTRNL ECG REC<48 HR R&I: CPT | Mod: ,,, | Performed by: INTERNAL MEDICINE

## 2021-08-11 PROCEDURE — 76775 US EXAM ABDO BACK WALL LIM: CPT | Mod: 26,,, | Performed by: RADIOLOGY

## 2021-08-11 RX ORDER — SODIUM CHLORIDE 0.9 % (FLUSH) 0.9 %
10 SYRINGE (ML) INJECTION
Status: DISCONTINUED | OUTPATIENT
Start: 2021-08-11 | End: 2021-09-22 | Stop reason: HOSPADM

## 2021-08-13 LAB
OHS CV EVENT MONITOR DAY: 0
OHS CV HOLTER LENGTH DECIMAL HOURS: 23.98
OHS CV HOLTER LENGTH HOURS: 23
OHS CV HOLTER LENGTH MINUTES: 59
OHS CV HOLTER SINUS AVERAGE HR: 97
OHS CV HOLTER SINUS MAX HR HOUR MINUTES: NORMAL
OHS CV HOLTER SINUS MAX HR: 182
OHS CV HOLTER SINUS MIN HR HOUR MINUTE: NORMAL
OHS CV HOLTER SINUS MIN HR: 41

## 2021-09-19 ENCOUNTER — CLINICAL SUPPORT (OUTPATIENT)
Dept: URGENT CARE | Facility: CLINIC | Age: 76
End: 2021-09-19
Payer: MEDICARE

## 2021-09-19 DIAGNOSIS — Z01.818 PRE-OP TESTING: ICD-10-CM

## 2021-09-19 PROCEDURE — 99211 OFF/OP EST MAY X REQ PHY/QHP: CPT | Mod: S$GLB,,, | Performed by: NURSE PRACTITIONER

## 2021-09-19 PROCEDURE — 99211 PR OFFICE/OUTPT VISIT, EST, LEVL I: ICD-10-PCS | Mod: S$GLB,,, | Performed by: NURSE PRACTITIONER

## 2021-09-19 PROCEDURE — U0003 INFECTIOUS AGENT DETECTION BY NUCLEIC ACID (DNA OR RNA); SEVERE ACUTE RESPIRATORY SYNDROME CORONAVIRUS 2 (SARS-COV-2) (CORONAVIRUS DISEASE [COVID-19]), AMPLIFIED PROBE TECHNIQUE, MAKING USE OF HIGH THROUGHPUT TECHNOLOGIES AS DESCRIBED BY CMS-2020-01-R: HCPCS | Mod: HCNC | Performed by: INTERNAL MEDICINE

## 2021-09-19 PROCEDURE — U0005 INFEC AGEN DETEC AMPLI PROBE: HCPCS | Performed by: INTERNAL MEDICINE

## 2021-09-20 ENCOUNTER — LAB VISIT (OUTPATIENT)
Dept: LAB | Facility: HOSPITAL | Age: 76
End: 2021-09-20
Attending: INTERNAL MEDICINE
Payer: MEDICARE

## 2021-09-20 DIAGNOSIS — Z01.818 PREOP EXAMINATION: ICD-10-CM

## 2021-09-20 LAB
ANION GAP SERPL CALC-SCNC: 8 MMOL/L (ref 8–16)
BASOPHILS # BLD AUTO: 0.08 K/UL (ref 0–0.2)
BASOPHILS NFR BLD: 1.4 % (ref 0–1.9)
BUN SERPL-MCNC: 19 MG/DL (ref 8–23)
CALCIUM SERPL-MCNC: 9.5 MG/DL (ref 8.7–10.5)
CHLORIDE SERPL-SCNC: 108 MMOL/L (ref 95–110)
CO2 SERPL-SCNC: 26 MMOL/L (ref 23–29)
CREAT SERPL-MCNC: 1.9 MG/DL (ref 0.5–1.4)
DIFFERENTIAL METHOD: ABNORMAL
EOSINOPHIL # BLD AUTO: 0.2 K/UL (ref 0–0.5)
EOSINOPHIL NFR BLD: 2.5 % (ref 0–8)
ERYTHROCYTE [DISTWIDTH] IN BLOOD BY AUTOMATED COUNT: 17.4 % (ref 11.5–14.5)
EST. GFR  (AFRICAN AMERICAN): 38 ML/MIN/1.73 M^2
EST. GFR  (NON AFRICAN AMERICAN): 33 ML/MIN/1.73 M^2
GLUCOSE SERPL-MCNC: 97 MG/DL (ref 70–110)
HCT VFR BLD AUTO: 39 % (ref 40–54)
HGB BLD-MCNC: 11.3 G/DL (ref 14–18)
IMM GRANULOCYTES # BLD AUTO: 0.01 K/UL (ref 0–0.04)
IMM GRANULOCYTES NFR BLD AUTO: 0.2 % (ref 0–0.5)
LYMPHOCYTES # BLD AUTO: 1.3 K/UL (ref 1–4.8)
LYMPHOCYTES NFR BLD: 22.1 % (ref 18–48)
MCH RBC QN AUTO: 23.8 PG (ref 27–31)
MCHC RBC AUTO-ENTMCNC: 29 G/DL (ref 32–36)
MCV RBC AUTO: 82 FL (ref 82–98)
MONOCYTES # BLD AUTO: 0.6 K/UL (ref 0.3–1)
MONOCYTES NFR BLD: 10.3 % (ref 4–15)
NEUTROPHILS # BLD AUTO: 3.8 K/UL (ref 1.8–7.7)
NEUTROPHILS NFR BLD: 63.5 % (ref 38–73)
NRBC BLD-RTO: 0 /100 WBC
PLATELET # BLD AUTO: 189 K/UL (ref 150–450)
PMV BLD AUTO: 11.3 FL (ref 9.2–12.9)
POTASSIUM SERPL-SCNC: 4.5 MMOL/L (ref 3.5–5.1)
RBC # BLD AUTO: 4.75 M/UL (ref 4.6–6.2)
SARS-COV-2 RNA RESP QL NAA+PROBE: NOT DETECTED
SARS-COV-2- CYCLE NUMBER: NORMAL
SODIUM SERPL-SCNC: 142 MMOL/L (ref 136–145)
WBC # BLD AUTO: 5.92 K/UL (ref 3.9–12.7)

## 2021-09-20 PROCEDURE — 36415 COLL VENOUS BLD VENIPUNCTURE: CPT | Mod: HCNC | Performed by: INTERNAL MEDICINE

## 2021-09-20 PROCEDURE — 85025 COMPLETE CBC W/AUTO DIFF WBC: CPT | Mod: HCNC | Performed by: INTERNAL MEDICINE

## 2021-09-20 PROCEDURE — 80048 BASIC METABOLIC PNL TOTAL CA: CPT | Mod: HCNC | Performed by: INTERNAL MEDICINE

## 2021-09-22 ENCOUNTER — ANESTHESIA EVENT (OUTPATIENT)
Dept: CARDIOLOGY | Facility: HOSPITAL | Age: 76
End: 2021-09-22
Payer: MEDICARE

## 2021-09-22 ENCOUNTER — HOSPITAL ENCOUNTER (OUTPATIENT)
Facility: HOSPITAL | Age: 76
Discharge: HOME | End: 2021-09-22
Attending: INTERNAL MEDICINE | Admitting: INTERNAL MEDICINE
Payer: MEDICARE

## 2021-09-22 ENCOUNTER — ANESTHESIA (OUTPATIENT)
Dept: CARDIOLOGY | Facility: HOSPITAL | Age: 76
End: 2021-09-22
Payer: MEDICARE

## 2021-09-22 VITALS
RESPIRATION RATE: 20 BRPM | BODY MASS INDEX: 23.32 KG/M2 | HEIGHT: 65 IN | SYSTOLIC BLOOD PRESSURE: 135 MMHG | TEMPERATURE: 97 F | HEART RATE: 84 BPM | WEIGHT: 140 LBS | OXYGEN SATURATION: 99 % | DIASTOLIC BLOOD PRESSURE: 74 MMHG

## 2021-09-22 DIAGNOSIS — I50.1 HEART FAILURE, LEFT, WITH LVEF <=30%: ICD-10-CM

## 2021-09-22 DIAGNOSIS — Z01.818 PREOP EXAMINATION: Primary | ICD-10-CM

## 2021-09-22 DIAGNOSIS — I48.91 ATRIAL FIBRILLATION STATUS POST CARDIOVERSION: ICD-10-CM

## 2021-09-22 DIAGNOSIS — I48.91 ATRIAL FIBRILLATION, UNSPECIFIED TYPE: ICD-10-CM

## 2021-09-22 DIAGNOSIS — Z01.810 PRE-OPERATIVE CARDIOVASCULAR EXAMINATION: ICD-10-CM

## 2021-09-22 DIAGNOSIS — Z01.818 PRE-OP TESTING: ICD-10-CM

## 2021-09-22 PROCEDURE — 93005 ELECTROCARDIOGRAM TRACING: CPT | Mod: HCNC,59

## 2021-09-22 PROCEDURE — 63600175 PHARM REV CODE 636 W HCPCS: Mod: HCNC | Performed by: NURSE ANESTHETIST, CERTIFIED REGISTERED

## 2021-09-22 PROCEDURE — 37000008 HC ANESTHESIA 1ST 15 MINUTES: Mod: HCNC | Performed by: INTERNAL MEDICINE

## 2021-09-22 PROCEDURE — 25000003 PHARM REV CODE 250: Mod: HCNC | Performed by: NURSE ANESTHETIST, CERTIFIED REGISTERED

## 2021-09-22 PROCEDURE — 37000009 HC ANESTHESIA EA ADD 15 MINS: Mod: HCNC | Performed by: INTERNAL MEDICINE

## 2021-09-22 PROCEDURE — 93010 EKG 12-LEAD: ICD-10-PCS | Mod: HCNC,,, | Performed by: INTERNAL MEDICINE

## 2021-09-22 PROCEDURE — 93010 ELECTROCARDIOGRAM REPORT: CPT | Mod: HCNC,76,, | Performed by: INTERNAL MEDICINE

## 2021-09-22 PROCEDURE — 92960 CARDIOVERSION ELECTRIC EXT: CPT | Mod: HCNC | Performed by: INTERNAL MEDICINE

## 2021-09-22 RX ORDER — PROPOFOL 10 MG/ML
VIAL (ML) INTRAVENOUS
Status: DISCONTINUED | OUTPATIENT
Start: 2021-09-22 | End: 2021-09-22

## 2021-09-22 RX ORDER — ETOMIDATE 2 MG/ML
INJECTION INTRAVENOUS
Status: DISCONTINUED | OUTPATIENT
Start: 2021-09-22 | End: 2021-09-22

## 2021-09-22 RX ORDER — LIDOCAINE HYDROCHLORIDE 20 MG/ML
INJECTION INTRAVENOUS
Status: DISCONTINUED | OUTPATIENT
Start: 2021-09-22 | End: 2021-09-22

## 2021-09-22 RX ADMIN — ETOMIDATE 2 MG: 2 INJECTION, SOLUTION INTRAVENOUS at 10:09

## 2021-09-22 RX ADMIN — PROPOFOL 20 MG: 10 INJECTION, EMULSION INTRAVENOUS at 10:09

## 2021-09-22 RX ADMIN — LIDOCAINE HYDROCHLORIDE 10 MG: 20 INJECTION, SOLUTION INTRAVENOUS at 10:09

## 2021-09-22 RX ADMIN — ETOMIDATE 6 MG: 2 INJECTION, SOLUTION INTRAVENOUS at 10:09

## 2021-09-22 RX ADMIN — PROPOFOL 10 MG: 10 INJECTION, EMULSION INTRAVENOUS at 10:09

## 2021-09-22 RX ADMIN — LIDOCAINE HYDROCHLORIDE 50 MG: 20 INJECTION, SOLUTION INTRAVENOUS at 10:09

## 2021-09-23 LAB
BSA FOR ECHO PROCEDURE: 1.71 M2
EJECTION FRACTION: 25 %

## 2021-10-09 ENCOUNTER — TELEPHONE (OUTPATIENT)
Dept: CARDIOLOGY | Facility: CLINIC | Age: 76
End: 2021-10-09

## 2021-10-11 ENCOUNTER — TELEPHONE (OUTPATIENT)
Dept: CARDIOLOGY | Facility: CLINIC | Age: 76
End: 2021-10-11

## 2021-10-22 ENCOUNTER — TELEPHONE (OUTPATIENT)
Dept: CARDIOLOGY | Facility: CLINIC | Age: 76
End: 2021-10-22

## 2021-11-15 ENCOUNTER — OFFICE VISIT (OUTPATIENT)
Dept: CARDIOLOGY | Facility: CLINIC | Age: 76
End: 2021-11-15
Payer: MEDICARE

## 2021-11-15 VITALS
SYSTOLIC BLOOD PRESSURE: 120 MMHG | BODY MASS INDEX: 24.21 KG/M2 | HEART RATE: 88 BPM | DIASTOLIC BLOOD PRESSURE: 55 MMHG | OXYGEN SATURATION: 95 % | WEIGHT: 145.5 LBS

## 2021-11-15 DIAGNOSIS — E78.5 HYPERLIPIDEMIA, UNSPECIFIED HYPERLIPIDEMIA TYPE: ICD-10-CM

## 2021-11-15 DIAGNOSIS — I25.119 CORONARY ARTERY DISEASE INVOLVING NATIVE HEART WITH ANGINA PECTORIS, UNSPECIFIED VESSEL OR LESION TYPE: Primary | ICD-10-CM

## 2021-11-15 DIAGNOSIS — I71.40 ABDOMINAL AORTIC ANEURYSM (AAA) WITHOUT RUPTURE: ICD-10-CM

## 2021-11-15 DIAGNOSIS — I73.9 PAD (PERIPHERAL ARTERY DISEASE): ICD-10-CM

## 2021-11-15 DIAGNOSIS — I48.91 ATRIAL FIBRILLATION, UNSPECIFIED TYPE: ICD-10-CM

## 2021-11-15 DIAGNOSIS — I10 ESSENTIAL HYPERTENSION: ICD-10-CM

## 2021-11-15 PROCEDURE — 99214 PR OFFICE/OUTPT VISIT, EST, LEVL IV, 30-39 MIN: ICD-10-PCS | Mod: HCNC,S$GLB,, | Performed by: INTERNAL MEDICINE

## 2021-11-15 PROCEDURE — 3078F PR MOST RECENT DIASTOLIC BLOOD PRESSURE < 80 MM HG: ICD-10-PCS | Mod: HCNC,CPTII,S$GLB, | Performed by: INTERNAL MEDICINE

## 2021-11-15 PROCEDURE — 3074F SYST BP LT 130 MM HG: CPT | Mod: HCNC,CPTII,S$GLB, | Performed by: INTERNAL MEDICINE

## 2021-11-15 PROCEDURE — 99499 RISK ADDL DX/OHS AUDIT: ICD-10-PCS | Mod: HCNC,S$GLB,, | Performed by: INTERNAL MEDICINE

## 2021-11-15 PROCEDURE — 1159F MED LIST DOCD IN RCRD: CPT | Mod: HCNC,CPTII,S$GLB, | Performed by: INTERNAL MEDICINE

## 2021-11-15 PROCEDURE — 1126F AMNT PAIN NOTED NONE PRSNT: CPT | Mod: HCNC,CPTII,S$GLB, | Performed by: INTERNAL MEDICINE

## 2021-11-15 PROCEDURE — 99999 PR PBB SHADOW E&M-EST. PATIENT-LVL III: CPT | Mod: PBBFAC,HCNC,, | Performed by: INTERNAL MEDICINE

## 2021-11-15 PROCEDURE — 99214 OFFICE O/P EST MOD 30 MIN: CPT | Mod: HCNC,S$GLB,, | Performed by: INTERNAL MEDICINE

## 2021-11-15 PROCEDURE — 99999 PR PBB SHADOW E&M-EST. PATIENT-LVL III: ICD-10-PCS | Mod: PBBFAC,HCNC,, | Performed by: INTERNAL MEDICINE

## 2021-11-15 PROCEDURE — 3074F PR MOST RECENT SYSTOLIC BLOOD PRESSURE < 130 MM HG: ICD-10-PCS | Mod: HCNC,CPTII,S$GLB, | Performed by: INTERNAL MEDICINE

## 2021-11-15 PROCEDURE — 1160F PR REVIEW ALL MEDS BY PRESCRIBER/CLIN PHARMACIST DOCUMENTED: ICD-10-PCS | Mod: HCNC,CPTII,S$GLB, | Performed by: INTERNAL MEDICINE

## 2021-11-15 PROCEDURE — 1160F RVW MEDS BY RX/DR IN RCRD: CPT | Mod: HCNC,CPTII,S$GLB, | Performed by: INTERNAL MEDICINE

## 2021-11-15 PROCEDURE — 93000 ELECTROCARDIOGRAM COMPLETE: CPT | Mod: HCNC,S$GLB,, | Performed by: INTERNAL MEDICINE

## 2021-11-15 PROCEDURE — 1126F PR PAIN SEVERITY QUANTIFIED, NO PAIN PRESENT: ICD-10-PCS | Mod: HCNC,CPTII,S$GLB, | Performed by: INTERNAL MEDICINE

## 2021-11-15 PROCEDURE — 1159F PR MEDICATION LIST DOCUMENTED IN MEDICAL RECORD: ICD-10-PCS | Mod: HCNC,CPTII,S$GLB, | Performed by: INTERNAL MEDICINE

## 2021-11-15 PROCEDURE — 3078F DIAST BP <80 MM HG: CPT | Mod: HCNC,CPTII,S$GLB, | Performed by: INTERNAL MEDICINE

## 2021-11-15 PROCEDURE — 93000 EKG 12-LEAD: ICD-10-PCS | Mod: HCNC,S$GLB,, | Performed by: INTERNAL MEDICINE

## 2021-11-15 PROCEDURE — 99499 UNLISTED E&M SERVICE: CPT | Mod: HCNC,S$GLB,, | Performed by: INTERNAL MEDICINE

## 2021-11-15 RX ORDER — AMIODARONE HYDROCHLORIDE 200 MG/1
200 TABLET ORAL 2 TIMES DAILY
Qty: 28 TABLET | Refills: 0 | Status: SHIPPED | OUTPATIENT
Start: 2021-11-15 | End: 2022-01-06 | Stop reason: SDUPTHER

## 2021-11-17 DIAGNOSIS — I10 ESSENTIAL HYPERTENSION: Chronic | ICD-10-CM

## 2021-11-17 RX ORDER — IRBESARTAN 300 MG/1
300 TABLET ORAL NIGHTLY
Qty: 90 TABLET | Refills: 3 | Status: SHIPPED | OUTPATIENT
Start: 2021-11-17 | End: 2021-11-23 | Stop reason: SDUPTHER

## 2021-11-23 DIAGNOSIS — I10 ESSENTIAL HYPERTENSION: Chronic | ICD-10-CM

## 2021-11-24 RX ORDER — IRBESARTAN 300 MG/1
300 TABLET ORAL NIGHTLY
Qty: 90 TABLET | Refills: 3 | Status: SHIPPED | OUTPATIENT
Start: 2021-11-24 | End: 2022-02-17

## 2022-01-06 ENCOUNTER — OFFICE VISIT (OUTPATIENT)
Dept: CARDIOLOGY | Facility: CLINIC | Age: 77
End: 2022-01-06
Payer: MEDICARE

## 2022-01-06 ENCOUNTER — TELEPHONE (OUTPATIENT)
Dept: CARDIOLOGY | Facility: CLINIC | Age: 77
End: 2022-01-06

## 2022-01-06 VITALS
BODY MASS INDEX: 22.96 KG/M2 | HEART RATE: 66 BPM | WEIGHT: 138 LBS | DIASTOLIC BLOOD PRESSURE: 66 MMHG | OXYGEN SATURATION: 98 % | SYSTOLIC BLOOD PRESSURE: 112 MMHG

## 2022-01-06 DIAGNOSIS — I71.40 ABDOMINAL AORTIC ANEURYSM (AAA) WITHOUT RUPTURE: Primary | ICD-10-CM

## 2022-01-06 PROCEDURE — 1159F PR MEDICATION LIST DOCUMENTED IN MEDICAL RECORD: ICD-10-PCS | Mod: HCNC,CPTII,S$GLB, | Performed by: INTERNAL MEDICINE

## 2022-01-06 PROCEDURE — 3078F PR MOST RECENT DIASTOLIC BLOOD PRESSURE < 80 MM HG: ICD-10-PCS | Mod: HCNC,CPTII,S$GLB, | Performed by: INTERNAL MEDICINE

## 2022-01-06 PROCEDURE — 99499 UNLISTED E&M SERVICE: CPT | Mod: HCNC,S$GLB,, | Performed by: INTERNAL MEDICINE

## 2022-01-06 PROCEDURE — 99999 PR PBB SHADOW E&M-EST. PATIENT-LVL III: ICD-10-PCS | Mod: PBBFAC,HCNC,, | Performed by: INTERNAL MEDICINE

## 2022-01-06 PROCEDURE — 1159F MED LIST DOCD IN RCRD: CPT | Mod: HCNC,CPTII,S$GLB, | Performed by: INTERNAL MEDICINE

## 2022-01-06 PROCEDURE — 1126F PR PAIN SEVERITY QUANTIFIED, NO PAIN PRESENT: ICD-10-PCS | Mod: HCNC,CPTII,S$GLB, | Performed by: INTERNAL MEDICINE

## 2022-01-06 PROCEDURE — 3078F DIAST BP <80 MM HG: CPT | Mod: HCNC,CPTII,S$GLB, | Performed by: INTERNAL MEDICINE

## 2022-01-06 PROCEDURE — 1160F RVW MEDS BY RX/DR IN RCRD: CPT | Mod: HCNC,CPTII,S$GLB, | Performed by: INTERNAL MEDICINE

## 2022-01-06 PROCEDURE — 3074F SYST BP LT 130 MM HG: CPT | Mod: HCNC,CPTII,S$GLB, | Performed by: INTERNAL MEDICINE

## 2022-01-06 PROCEDURE — 99999 PR PBB SHADOW E&M-EST. PATIENT-LVL III: CPT | Mod: PBBFAC,HCNC,, | Performed by: INTERNAL MEDICINE

## 2022-01-06 PROCEDURE — 1126F AMNT PAIN NOTED NONE PRSNT: CPT | Mod: HCNC,CPTII,S$GLB, | Performed by: INTERNAL MEDICINE

## 2022-01-06 PROCEDURE — 3074F PR MOST RECENT SYSTOLIC BLOOD PRESSURE < 130 MM HG: ICD-10-PCS | Mod: HCNC,CPTII,S$GLB, | Performed by: INTERNAL MEDICINE

## 2022-01-06 PROCEDURE — 1160F PR REVIEW ALL MEDS BY PRESCRIBER/CLIN PHARMACIST DOCUMENTED: ICD-10-PCS | Mod: HCNC,CPTII,S$GLB, | Performed by: INTERNAL MEDICINE

## 2022-01-06 PROCEDURE — 99499 NO LOS: ICD-10-PCS | Mod: HCNC,S$GLB,, | Performed by: INTERNAL MEDICINE

## 2022-01-06 RX ORDER — AMIODARONE HYDROCHLORIDE 100 MG/1
100 TABLET ORAL DAILY
Qty: 30 TABLET | Refills: 11 | Status: SHIPPED | OUTPATIENT
Start: 2022-01-06 | End: 2022-02-17 | Stop reason: SDUPTHER

## 2022-01-06 RX ORDER — AMIODARONE HYDROCHLORIDE 200 MG/1
200 TABLET ORAL 2 TIMES DAILY
Qty: 28 TABLET | Refills: 0 | Status: SHIPPED | OUTPATIENT
Start: 2022-01-06 | End: 2022-01-20

## 2022-01-06 NOTE — PROGRESS NOTES
Vitals:    01/06/22 1441   BP: 112/66   Pulse: 66     Did not receive the amiodarone prescription  Would like it through [facility] (was sent to [facility] previously)  Sent a new prescription  Amiodarone 200 mg bid for 2 weeks, then 100 mg daily    FUP 6 weeks with EKG    Discussed FUP abd US for AAA ordered

## 2022-01-26 ENCOUNTER — HOSPITAL ENCOUNTER (OUTPATIENT)
Dept: RADIOLOGY | Facility: HOSPITAL | Age: 77
Discharge: HOME | End: 2022-01-26
Attending: INTERNAL MEDICINE
Payer: MEDICARE

## 2022-01-26 DIAGNOSIS — I71.40 ABDOMINAL AORTIC ANEURYSM (AAA) WITHOUT RUPTURE: ICD-10-CM

## 2022-01-26 PROCEDURE — 76775 US ABDOMINAL AORTA: ICD-10-PCS | Mod: 26,HCNC,, | Performed by: INTERNAL MEDICINE

## 2022-01-26 PROCEDURE — 76775 US EXAM ABDO BACK WALL LIM: CPT | Mod: 26,HCNC,, | Performed by: INTERNAL MEDICINE

## 2022-01-26 PROCEDURE — 76775 US EXAM ABDO BACK WALL LIM: CPT | Mod: TC,HCNC

## 2022-02-17 ENCOUNTER — OFFICE VISIT (OUTPATIENT)
Dept: CARDIOLOGY | Facility: CLINIC | Age: 77
End: 2022-02-17
Payer: MEDICARE

## 2022-02-17 VITALS
OXYGEN SATURATION: 99 % | WEIGHT: 141.13 LBS | HEART RATE: 59 BPM | BODY MASS INDEX: 23.48 KG/M2 | SYSTOLIC BLOOD PRESSURE: 101 MMHG | DIASTOLIC BLOOD PRESSURE: 58 MMHG

## 2022-02-17 DIAGNOSIS — I10 ESSENTIAL HYPERTENSION: ICD-10-CM

## 2022-02-17 DIAGNOSIS — I25.10 CORONARY ARTERY DISEASE INVOLVING NATIVE CORONARY ARTERY WITHOUT ANGINA PECTORIS, UNSPECIFIED WHETHER NATIVE OR TRANSPLANTED HEART: ICD-10-CM

## 2022-02-17 DIAGNOSIS — I48.91 ATRIAL FIBRILLATION, UNSPECIFIED TYPE: Primary | ICD-10-CM

## 2022-02-17 PROCEDURE — 1159F PR MEDICATION LIST DOCUMENTED IN MEDICAL RECORD: ICD-10-PCS | Mod: HCNC,CPTII,S$GLB, | Performed by: INTERNAL MEDICINE

## 2022-02-17 PROCEDURE — 99213 OFFICE O/P EST LOW 20 MIN: CPT | Mod: HCNC,25,S$GLB, | Performed by: INTERNAL MEDICINE

## 2022-02-17 PROCEDURE — 93000 EKG 12-LEAD: ICD-10-PCS | Mod: HCNC,S$GLB,, | Performed by: INTERNAL MEDICINE

## 2022-02-17 PROCEDURE — 3074F PR MOST RECENT SYSTOLIC BLOOD PRESSURE < 130 MM HG: ICD-10-PCS | Mod: HCNC,CPTII,S$GLB, | Performed by: INTERNAL MEDICINE

## 2022-02-17 PROCEDURE — 3078F DIAST BP <80 MM HG: CPT | Mod: HCNC,CPTII,S$GLB, | Performed by: INTERNAL MEDICINE

## 2022-02-17 PROCEDURE — 99213 PR OFFICE/OUTPT VISIT, EST, LEVL III, 20-29 MIN: ICD-10-PCS | Mod: HCNC,25,S$GLB, | Performed by: INTERNAL MEDICINE

## 2022-02-17 PROCEDURE — 1126F AMNT PAIN NOTED NONE PRSNT: CPT | Mod: HCNC,CPTII,S$GLB, | Performed by: INTERNAL MEDICINE

## 2022-02-17 PROCEDURE — 99999 PR PBB SHADOW E&M-EST. PATIENT-LVL III: CPT | Mod: PBBFAC,HCNC,, | Performed by: INTERNAL MEDICINE

## 2022-02-17 PROCEDURE — 93000 ELECTROCARDIOGRAM COMPLETE: CPT | Mod: HCNC,S$GLB,, | Performed by: INTERNAL MEDICINE

## 2022-02-17 PROCEDURE — 3074F SYST BP LT 130 MM HG: CPT | Mod: HCNC,CPTII,S$GLB, | Performed by: INTERNAL MEDICINE

## 2022-02-17 PROCEDURE — 1159F MED LIST DOCD IN RCRD: CPT | Mod: HCNC,CPTII,S$GLB, | Performed by: INTERNAL MEDICINE

## 2022-02-17 PROCEDURE — 3078F PR MOST RECENT DIASTOLIC BLOOD PRESSURE < 80 MM HG: ICD-10-PCS | Mod: HCNC,CPTII,S$GLB, | Performed by: INTERNAL MEDICINE

## 2022-02-17 PROCEDURE — 1160F RVW MEDS BY RX/DR IN RCRD: CPT | Mod: HCNC,CPTII,S$GLB, | Performed by: INTERNAL MEDICINE

## 2022-02-17 PROCEDURE — 99999 PR PBB SHADOW E&M-EST. PATIENT-LVL III: ICD-10-PCS | Mod: PBBFAC,HCNC,, | Performed by: INTERNAL MEDICINE

## 2022-02-17 PROCEDURE — 1126F PR PAIN SEVERITY QUANTIFIED, NO PAIN PRESENT: ICD-10-PCS | Mod: HCNC,CPTII,S$GLB, | Performed by: INTERNAL MEDICINE

## 2022-02-17 PROCEDURE — 1160F PR REVIEW ALL MEDS BY PRESCRIBER/CLIN PHARMACIST DOCUMENTED: ICD-10-PCS | Mod: HCNC,CPTII,S$GLB, | Performed by: INTERNAL MEDICINE

## 2022-02-17 RX ORDER — METOPROLOL SUCCINATE 25 MG/1
25 TABLET, EXTENDED RELEASE ORAL DAILY
Qty: 90 TABLET | Refills: 3 | Status: SHIPPED | OUTPATIENT
Start: 2022-02-17 | End: 2022-06-16

## 2022-02-17 RX ORDER — IRBESARTAN 150 MG/1
150 TABLET ORAL NIGHTLY
Qty: 90 TABLET | Refills: 3 | Status: SHIPPED | OUTPATIENT
Start: 2022-02-17 | End: 2023-02-16 | Stop reason: SDUPTHER

## 2022-02-17 RX ORDER — AMIODARONE HYDROCHLORIDE 200 MG/1
100 TABLET ORAL DAILY
Qty: 45 TABLET | Refills: 3 | Status: SHIPPED | OUTPATIENT
Start: 2022-02-17 | End: 2022-06-16

## 2022-02-17 NOTE — PROGRESS NOTES
Vitals:    02/17/22 1459   BP: (!) 101/58   Pulse: (!) 59     EKG with SB 50s, non-sp ST-T abn. QTc 442    Continue amiodarone 100 mg daily  Decrease metoprolol succinate to 25 mg daily given HR  Decrease irbesartan to 150 mg daily given BP    Monitor home BP, HR  Call if lower range to further titrate medications as needed

## 2022-03-29 ENCOUNTER — TELEPHONE (OUTPATIENT)
Dept: CARDIOLOGY | Facility: CLINIC | Age: 77
End: 2022-03-29
Payer: MEDICARE

## 2022-03-29 NOTE — TELEPHONE ENCOUNTER
----- Message from Elayne Morales sent at 3/29/2022  2:11 PM CDT -----  Type:  Sooner Apoointment Request    Caller is requesting a sooner appointment.  Caller declined first available appointment listed below.  Caller will not accept being placed on the waitlist and is requesting a message be sent to doctor.  Name of Caller:pt wife  When is the first available appointment?04/04/22  Symptoms:pain in both legs  Would the patient rather a call back or a response via [facility]? call  Best Call Back Number:[phone]  Additional Information:

## 2022-03-29 NOTE — TELEPHONE ENCOUNTER
Pt wife called again  Call transferred from call center    Stated pt does not want to go to ED  Feels better today    Would like to follow up in the clinic    Advised pt wife to call 911 or bring pt to ED if any symptoms worsen       ND   - - -

## 2022-03-29 NOTE — TELEPHONE ENCOUNTER
"Called pt wife back in regards to this message     Pt wife stated pt has pain in both legs and REBOLLEDO  Stated pt does not look like himself "has never seen him look like this before"    Advised pt wife to bring pt to ED to be evaluated by a provider and will send a message to Dr. Jones as well    Pt wife expressed understanding    ND      "

## 2022-03-30 ENCOUNTER — TELEPHONE (OUTPATIENT)
Dept: CARDIOLOGY | Facility: CLINIC | Age: 77
End: 2022-03-30
Payer: MEDICARE

## 2022-03-30 NOTE — TELEPHONE ENCOUNTER
Called pt to follow up and schedule sooner appt with Dr. Jones    Pt did not answer  Could not leave voice message    ND

## 2022-03-31 ENCOUNTER — OFFICE VISIT (OUTPATIENT)
Dept: CARDIOLOGY | Facility: CLINIC | Age: 77
End: 2022-03-31
Payer: MEDICARE

## 2022-03-31 VITALS
WEIGHT: 138 LBS | OXYGEN SATURATION: 96 % | HEART RATE: 67 BPM | DIASTOLIC BLOOD PRESSURE: 52 MMHG | BODY MASS INDEX: 22.96 KG/M2 | SYSTOLIC BLOOD PRESSURE: 115 MMHG

## 2022-03-31 DIAGNOSIS — I42.8 OTHER CARDIOMYOPATHY: Primary | ICD-10-CM

## 2022-03-31 DIAGNOSIS — I25.10 CORONARY ARTERY DISEASE INVOLVING NATIVE CORONARY ARTERY WITHOUT ANGINA PECTORIS, UNSPECIFIED WHETHER NATIVE OR TRANSPLANTED HEART: ICD-10-CM

## 2022-03-31 DIAGNOSIS — I10 ESSENTIAL HYPERTENSION: ICD-10-CM

## 2022-03-31 DIAGNOSIS — I71.40 ABDOMINAL AORTIC ANEURYSM (AAA) WITHOUT RUPTURE: ICD-10-CM

## 2022-03-31 DIAGNOSIS — I50.1 HEART FAILURE, LEFT, WITH LVEF <=30%: ICD-10-CM

## 2022-03-31 DIAGNOSIS — E78.5 HYPERLIPIDEMIA, UNSPECIFIED HYPERLIPIDEMIA TYPE: ICD-10-CM

## 2022-03-31 DIAGNOSIS — I73.9 PAD (PERIPHERAL ARTERY DISEASE): ICD-10-CM

## 2022-03-31 DIAGNOSIS — I48.91 ATRIAL FIBRILLATION, UNSPECIFIED TYPE: ICD-10-CM

## 2022-03-31 PROCEDURE — 1160F RVW MEDS BY RX/DR IN RCRD: CPT | Mod: CPTII,S$GLB,, | Performed by: INTERNAL MEDICINE

## 2022-03-31 PROCEDURE — 3078F PR MOST RECENT DIASTOLIC BLOOD PRESSURE < 80 MM HG: ICD-10-PCS | Mod: CPTII,S$GLB,, | Performed by: INTERNAL MEDICINE

## 2022-03-31 PROCEDURE — 1125F PR PAIN SEVERITY QUANTIFIED, PAIN PRESENT: ICD-10-PCS | Mod: CPTII,S$GLB,, | Performed by: INTERNAL MEDICINE

## 2022-03-31 PROCEDURE — 1160F PR REVIEW ALL MEDS BY PRESCRIBER/CLIN PHARMACIST DOCUMENTED: ICD-10-PCS | Mod: CPTII,S$GLB,, | Performed by: INTERNAL MEDICINE

## 2022-03-31 PROCEDURE — 3074F PR MOST RECENT SYSTOLIC BLOOD PRESSURE < 130 MM HG: ICD-10-PCS | Mod: CPTII,S$GLB,, | Performed by: INTERNAL MEDICINE

## 2022-03-31 PROCEDURE — 1125F AMNT PAIN NOTED PAIN PRSNT: CPT | Mod: CPTII,S$GLB,, | Performed by: INTERNAL MEDICINE

## 2022-03-31 PROCEDURE — 99999 PR PBB SHADOW E&M-EST. PATIENT-LVL III: CPT | Mod: PBBFAC,,, | Performed by: INTERNAL MEDICINE

## 2022-03-31 PROCEDURE — 93000 EKG 12-LEAD: ICD-10-PCS | Mod: S$GLB,,, | Performed by: INTERNAL MEDICINE

## 2022-03-31 PROCEDURE — 99215 OFFICE O/P EST HI 40 MIN: CPT | Mod: S$GLB,,, | Performed by: INTERNAL MEDICINE

## 2022-03-31 PROCEDURE — 93000 ELECTROCARDIOGRAM COMPLETE: CPT | Mod: S$GLB,,, | Performed by: INTERNAL MEDICINE

## 2022-03-31 PROCEDURE — 1159F PR MEDICATION LIST DOCUMENTED IN MEDICAL RECORD: ICD-10-PCS | Mod: CPTII,S$GLB,, | Performed by: INTERNAL MEDICINE

## 2022-03-31 PROCEDURE — 3074F SYST BP LT 130 MM HG: CPT | Mod: CPTII,S$GLB,, | Performed by: INTERNAL MEDICINE

## 2022-03-31 PROCEDURE — 99999 PR PBB SHADOW E&M-EST. PATIENT-LVL III: ICD-10-PCS | Mod: PBBFAC,,, | Performed by: INTERNAL MEDICINE

## 2022-03-31 PROCEDURE — 99215 PR OFFICE/OUTPT VISIT, EST, LEVL V, 40-54 MIN: ICD-10-PCS | Mod: S$GLB,,, | Performed by: INTERNAL MEDICINE

## 2022-03-31 PROCEDURE — 3078F DIAST BP <80 MM HG: CPT | Mod: CPTII,S$GLB,, | Performed by: INTERNAL MEDICINE

## 2022-03-31 PROCEDURE — 1159F MED LIST DOCD IN RCRD: CPT | Mod: CPTII,S$GLB,, | Performed by: INTERNAL MEDICINE

## 2022-03-31 RX ORDER — CHLORTHALIDONE 25 MG/1
TABLET ORAL
Qty: 90 TABLET | Refills: 3 | Status: SHIPPED | OUTPATIENT
Start: 2022-03-31 | End: 2022-04-28

## 2022-03-31 RX ORDER — AMLODIPINE BESYLATE 5 MG/1
TABLET ORAL
Qty: 90 TABLET | Refills: 3 | Status: SHIPPED | OUTPATIENT
Start: 2022-03-31 | End: 2023-03-09

## 2022-03-31 NOTE — TELEPHONE ENCOUNTER
----- Message from La Byrd sent at 3/31/2022  2:38 PM CDT -----  Contact: Jessica(Wife)-[phone]  Type:  Needs Medical Advice    Who Called: Pt's Wife  Reason for call: regarding the pt does not have a Prescription for chlorthalidone (HYGROTEN) 25 MG Tab  Pharmacy name and phone #:  [facility]/pharmacy #[id number] - [place], [place] - [address] [facility]  Would the patient rather a call back or a response via [facility]? Call back  Best Call Back Number: [phone]

## 2022-03-31 NOTE — PROGRESS NOTES
Cardiology Clinic note    Subjective:   Patient ID:  Rex Song is a 77 y.o. male who presents for CAD, PAD FUP    HPI:   CAD: Denies CP. Does endorse some SOB and swelling - though selling now resolved. Continues to smoke tobacco - states he quit today    PAD: Does endorse some pain- unclear if arthritis. No ulcers or wounds    Afib: No palpitations, irregular heart beat, syncope or near syncope    HTN: On medications    HLD: Tolerating statin    SH Tobacco 2 cigarettes a day; plans to quit. Cessation referral deferred  FH No premature CAD    Patient Active Problem List    Diagnosis Date Noted    Complex renal cyst 02/07/2018    CKD (chronic kidney disease) stage 4, GFR 15-29 ml/min 06/03/2015    Tobacco abuse 06/03/2015    Screening for colon cancer 11/12/2014    Benign neoplasm of colon 10/29/2014    Colon cancer screening 04/30/2014    Dysphagia 03/24/2014    CAD (coronary artery disease) 11/12/2012      RCA      Post PTCA 11/12/2012      LCX and PDA  stent 08/18/2000       Essential hypertension 11/12/2012    Mixed hyperlipidemia 11/12/2012    AAA (abdominal aortic aneurysm) 11/12/2012    PVD (peripheral vascular disease) 11/12/2012    Multiple sclerosis 11/12/2012       Patient's Medications   New Prescriptions    No medications on file   Previous Medications    AMIODARONE (PACERONE) 200 MG TAB    Take 0.5 tablets (100 mg total) by mouth once daily.    AMLODIPINE (NORVASC) 10 MG TABLET    TAKE 1 TABLET EVERY DAY    CHLORTHALIDONE (HYGROTEN) 25 MG TAB    TAKE 1 TABLET ONE TIME DAILY    CILOSTAZOL (PLETAL) 100 MG TAB    TAKE 1 TABLET (100 MG TOTAL) BY MOUTH 2 (TWO) TIMES DAILY BEFORE MEALS.    CLOPIDOGREL (PLAVIX) 75 MG TABLET    Take 1 tablet (75 mg total) by mouth once daily.    IRBESARTAN (AVAPRO) 150 MG TABLET    Take 1 tablet (150 mg total) by mouth every evening.    METOPROLOL SUCCINATE (TOPROL-XL) 25 MG 24 HR TABLET    Take 1 tablet (25 mg total) by mouth once daily.     NITROGLYCERIN (NITROSTAT) 0.4 MG SL TABLET    Place 1 tablet (0.4 mg total) under the tongue every 5 (five) minutes as needed.    OMEPRAZOLE (PRILOSEC) 40 MG CAPSULE    TAKE 1 CAPSULE EVERY MORNING    RIVAROXABAN (XARELTO) 20 MG TAB    Take 1 tablet (20 mg total) by mouth once daily.    ROSUVASTATIN (CRESTOR) 40 MG TAB    TAKE 1 TABLET (40 MG TOTAL) BY MOUTH ONCE DAILY.   Modified Medications    No medications on file   Discontinued Medications    No medications on file        Review of Systems   Constitutional: Negative for fever.   HENT: Negative for nosebleeds.    Cardiovascular: Negative for chest pain, irregular heartbeat, near-syncope, palpitations and syncope.        As above   Respiratory: Negative for hemoptysis.    Hematologic/Lymphatic: Negative for bleeding problem.   Musculoskeletal: Positive for arthritis.   Gastrointestinal: Negative for hematochezia.   Genitourinary: Negative for hematuria.   Neurological: Negative for seizures.   Allergic/Immunologic: Negative for environmental allergies.         Objective:   Vitals  Vitals:    03/31/22 1131   BP: (!) 115/52   Pulse: 67   SpO2: 96%   Weight: 62.6 kg (138 lb)          Physical Exam  Constitutional:       General: He is not in acute distress.     Appearance: He is well-developed. He is not diaphoretic.   HENT:      Head: Normocephalic.   Neck:      Vascular: No JVD.   Cardiovascular:      Rate and Rhythm: Regular rhythm. Bradycardia present.      Heart sounds: No murmur heard.    No friction rub. No gallop.   Pulmonary:      Effort: Pulmonary effort is normal. No respiratory distress.      Breath sounds: Normal breath sounds.   Abdominal:      Palpations: Abdomen is soft.      Tenderness: There is no abdominal tenderness.   Musculoskeletal:         General: No swelling.      Cervical back: Normal range of motion.   Skin:     General: Skin is warm.   Neurological:      Mental Status: He is alert.   Psychiatric:         Mood and Affect: Mood normal.              Assessment:     1. Other cardiomyopathy    2. 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 Coronary artery disease involving native coronary artery without angina pectoris, unspecified whether native or transplanted heart    4. PAD (peripheral artery disease)    5. Atrial fibrillation, unspecified type    6. Abdominal aortic aneurysm (AAA) without rupture    7. Essential hypertension    8. Hyperlipidemia, unspecified hyperlipidemia type        Plan:   Rex Song is a 77 y.o. male h/o CAD, PAD, HTN, HLD  H/o TIA, stroke    - EKG personally reviewed. My interpretation:  3/31/22: SB 50s, RAD. Subtle STD IL leads. QTc 457  11/15/21: Afib HR 100s, normal axis. Non-sp ST-T abn. QTc 486  - Echo Aug 2021: LVEF 30%, GHK, DD c/e Afib. Normal RVSF. Mild MR, TR. Mod LAE. No P-HTN  - Echo 2015: LVEF 50%, normal diastology. Normal RVSF. Trivial AI, TR. Low CVP    Swelling  Resolved  Had high salt diet couple of days back. Counseled on salt restriction    SOB  Repeat echo  No signs of ADHF  Airways tight on exam - asked to FUP with PCP. His friend who has accompanied him to the visit will make an appointment  Discussed cessation - he says he quit today  FUP 1-2 weeks    CAD  - On review of prior notes:  - sp NSTEMI 8/18/2000, sp stent LCx (x2?), PDA. Distal LAD 90% stenosis, ostial D2 90%, proximal LCx 90%, mid LCx 90%. Proximal PDA subtotal. Proximal RCA occluded  - Regadenoson nuclear stress 2015: Negative for ischemia (EKG, Nuc)  - Aspirin, clopidogrel, statin    PAD  - GONZALO at rest: R-0.68, L-0.68. Exercise R-0.37, L-0.31  - US 2016: There is an Abdominal Aortic Aneurysm largest at the infrarenal level measuring 3.74 cm. There is an accelerated PSV at the right common iliac level of 224 cm/s indicating a possible stenosis.   - Clopidogrel, statin  - Cilostazol  - Discussed if worsening symptoms possible angiogram. Previously declined    Afib  Back in Afib (sp DCCV Sept 2021)- converted to SR with amiodarone.  Continues to be in SR  CHADS-VASc 5 (HTN, Age x2, h/o stroke, CAD)  HAS-BLED 3 (Age, clopidogrel, h/o stroke)  Rivaroxaban  Lab Results   Component Value Date    CREATININE 1.9 (H) 09/20/2021    AST 14 09/19/2018    ALT 10 09/19/2018   Metoprolol succinate    AAA  - Abd US Jan 2022:  Mildly aneurysmal abdominal aorta measuring 3.4 cm, demonstrating some mural thrombus similar to prior exam.  Clinical considerations will determine follow-up.  Based on aneurysm size, consider follow-up in 3 years.  - Abd US 2019: Infrarenal Aortic Aneurysm 3.3 cm  - US 2016: There is an Abdominal Aortic Aneurysm largest at the infrarenal level measuring 3.74 cm. There is an accelerated PSV at the right common iliac level of 224 cm/s indicating a possible stenosis.     HTN  BP well controlled  Amlodipine, chlorthalidone, irbesartan, metoprolol succinate    HLD  Lab Results   Component Value Date    LDLCALC 51.6 (L) 05/08/2019   Statin as above    Continue with current medical plan and lifestyle changes.    Orders Placed This Encounter   Procedures    EKG 12-lead    Echo     Standing Status:   Future     Standing Expiration Date:   3/31/2023     Order Specific Question:   Release to patient     Answer:   Immediate    Echo     Standing Status:   Future     Standing Expiration Date:   3/31/2023     Order Specific Question:   Release to patient     Answer:   Immediate       Follow up as scheduled  Return sooner for concerns or questions. If symptoms persist go to the ED    He expressed verbal understanding and agreed with the plan      Reshma Jones MD  Interventional Cardiology  [facility]  Phone: [phone]

## 2022-04-01 ENCOUNTER — TELEPHONE (OUTPATIENT)
Dept: CARDIOLOGY | Facility: CLINIC | Age: 77
End: 2022-04-01
Payer: MEDICARE

## 2022-04-01 NOTE — TELEPHONE ENCOUNTER
----- Message from Letitia Echeverria sent at 4/1/2022  4:42 PM CDT -----  Contact: pt  Type:  Needs Medical Advice    Who Called: pt's wife Jessica    Symptoms (please be specific):     How long has patient had these symptoms:      Pharmacy name and phone #:      [id number] - [place] - [address] [facility]   Phone:  [phone]  Fax:  [phone]          Would the patient rather a call back or a response via [facility]? Call      Best Call Back Number: [phone] (M)     Additional Information: can you call in a 7 day supply to pharmacy . They don't have any.           chlorthalidone (HYGROTEN) 25 MG Tab 90 tablet 3 3/31/2022  No  Sig: TAKE 1 TABLET ONE TIME DAILY  Sent to pharmacy as: chlorthalidone (HYGROTEN) 25 MG Tab  Class: Normal  Notes to Pharmacy: .  Order: [id number]  Date/Time Signed: 3/31/2022 17:02      E-Prescribing Status: Receipt confirmed by pharmacy (3/31/2022  5:02 PM CDT)

## 2022-04-05 ENCOUNTER — HOSPITAL ENCOUNTER (OUTPATIENT)
Dept: CARDIOLOGY | Facility: HOSPITAL | Age: 77
Discharge: HOME | End: 2022-04-05
Attending: INTERNAL MEDICINE
Payer: MEDICARE

## 2022-04-05 VITALS — WEIGHT: 138 LBS | HEIGHT: 65 IN | BODY MASS INDEX: 22.99 KG/M2

## 2022-04-05 DIAGNOSIS — I42.8 OTHER CARDIOMYOPATHY: ICD-10-CM

## 2022-04-05 LAB
AORTIC ROOT ANNULUS: 3.31 CM
AORTIC VALVE CUSP SEPERATION: 1.91 CM
AV INDEX (PROSTH): 0.49
AV MEAN GRADIENT: 8 MMHG
AV PEAK GRADIENT: 14 MMHG
AV VALVE AREA: 1.61 CM2
AV VELOCITY RATIO: 0.56
BSA FOR ECHO PROCEDURE: 1.69 M2
CV ECHO LV RWT: 0.32 CM
DOP CALC AO PEAK VEL: 1.87 M/S
DOP CALC AO VTI: 41.24 CM
DOP CALC LVOT AREA: 3.3 CM2
DOP CALC LVOT DIAMETER: 2.05 CM
DOP CALC LVOT PEAK VEL: 1.05 M/S
DOP CALC LVOT STROKE VOLUME: 66.38 CM3
DOP CALC MV VTI: 34.16 CM
DOP CALCLVOT PEAK VEL VTI: 20.12 CM
E WAVE DECELERATION TIME: 166.78 MSEC
E/A RATIO: 1.39
E/E' RATIO: 11.65 M/S
ECHO LV POSTERIOR WALL: 0.96 CM (ref 0.6–1.1)
EJECTION FRACTION: 45 %
FRACTIONAL SHORTENING: 31 % (ref 28–44)
INTERVENTRICULAR SEPTUM: 1.02 CM (ref 0.6–1.1)
IVRT: 55.19 MSEC
LA MAJOR: 5.56 CM
LA MINOR: 6.24 CM
LA WIDTH: 4.02 CM
LEFT ATRIUM SIZE: 4.74 CM
LEFT ATRIUM VOLUME INDEX MOD: 29.5 ML/M2
LEFT ATRIUM VOLUME INDEX: 56.4 ML/M2
LEFT ATRIUM VOLUME MOD: 49.92 CM3
LEFT ATRIUM VOLUME: 95.24 CM3
LEFT INTERNAL DIMENSION IN SYSTOLE: 4.1 CM (ref 2.1–4)
LEFT VENTRICLE DIASTOLIC VOLUME INDEX: 104.09 ML/M2
LEFT VENTRICLE DIASTOLIC VOLUME: 175.92 ML
LEFT VENTRICLE MASS INDEX: 142 G/M2
LEFT VENTRICLE SYSTOLIC VOLUME INDEX: 43.8 ML/M2
LEFT VENTRICLE SYSTOLIC VOLUME: 74.09 ML
LEFT VENTRICULAR INTERNAL DIMENSION IN DIASTOLE: 5.94 CM (ref 3.5–6)
LEFT VENTRICULAR MASS: 239.56 G
LV LATERAL E/E' RATIO: 9.9 M/S
LV SEPTAL E/E' RATIO: 14.14 M/S
MV A" WAVE DURATION": 17.13 MSEC
MV MEAN GRADIENT: 0 MMHG
MV PEAK A VEL: 0.71 M/S
MV PEAK E VEL: 0.99 M/S
MV PEAK GRADIENT: 5 MMHG
MV STENOSIS PRESSURE HALF TIME: 48.37 MS
MV VALVE AREA BY CONTINUITY EQUATION: 1.94 CM2
MV VALVE AREA P 1/2 METHOD: 4.55 CM2
PISA TR MAX VEL: 2.75 M/S
PULM VEIN S/D RATIO: 0.75
PV PEAK D VEL: 0.65 M/S
PV PEAK S VEL: 0.49 M/S
PV PEAK VELOCITY: 1.37 CM/S
RA MAJOR: 5.74 CM
RA PRESSURE: 3 MMHG
RA WIDTH: 3.58 CM
RIGHT VENTRICULAR END-DIASTOLIC DIMENSION: 2.43 CM
TDI LATERAL: 0.1 M/S
TDI SEPTAL: 0.07 M/S
TDI: 0.09 M/S
TR MAX PG: 30 MMHG
TRICUSPID ANNULAR PLANE SYSTOLIC EXCURSION: 1.93 CM
TV REST PULMONARY ARTERY PRESSURE: 33 MMHG

## 2022-04-05 PROCEDURE — 93306 TTE W/DOPPLER COMPLETE: CPT | Mod: 26,,, | Performed by: INTERNAL MEDICINE

## 2022-04-05 PROCEDURE — 93306 ECHO (CUPID ONLY): ICD-10-PCS | Mod: 26,,, | Performed by: INTERNAL MEDICINE

## 2022-04-05 PROCEDURE — 93306 TTE W/DOPPLER COMPLETE: CPT

## 2022-04-10 ENCOUNTER — HOSPITAL ENCOUNTER (INPATIENT)
Facility: HOSPITAL | Age: 77
LOS: 3 days | Discharge: HOME HEALTH | DRG: 813 | End: 2022-04-13
Attending: EMERGENCY MEDICINE | Admitting: INTERNAL MEDICINE
Payer: MEDICARE

## 2022-04-10 DIAGNOSIS — R53.1 WEAKNESS: Primary | ICD-10-CM

## 2022-04-10 DIAGNOSIS — N17.9 ACUTE KIDNEY INJURY SUPERIMPOSED ON CKD: ICD-10-CM

## 2022-04-10 DIAGNOSIS — I25.119 CORONARY ARTERY DISEASE INVOLVING NATIVE HEART WITH ANGINA PECTORIS, UNSPECIFIED VESSEL OR LESION TYPE: Chronic | ICD-10-CM

## 2022-04-10 DIAGNOSIS — I10 ESSENTIAL HYPERTENSION: Chronic | ICD-10-CM

## 2022-04-10 DIAGNOSIS — I50.20 HFREF (HEART FAILURE WITH REDUCED EJECTION FRACTION): Chronic | ICD-10-CM

## 2022-04-10 DIAGNOSIS — G35 MULTIPLE SCLEROSIS: Chronic | ICD-10-CM

## 2022-04-10 DIAGNOSIS — N17.9 ACUTE RENAL INJURY: ICD-10-CM

## 2022-04-10 DIAGNOSIS — K92.2 GI BLEED: ICD-10-CM

## 2022-04-10 DIAGNOSIS — E03.9 HYPOTHYROIDISM, UNSPECIFIED TYPE: ICD-10-CM

## 2022-04-10 DIAGNOSIS — D62 ACUTE BLOOD LOSS ANEMIA: ICD-10-CM

## 2022-04-10 DIAGNOSIS — D69.6 THROMBOCYTOPENIA: ICD-10-CM

## 2022-04-10 DIAGNOSIS — N18.9 ACUTE KIDNEY INJURY SUPERIMPOSED ON CKD: ICD-10-CM

## 2022-04-10 DIAGNOSIS — K92.2 GASTROINTESTINAL HEMORRHAGE, UNSPECIFIED GASTROINTESTINAL HEMORRHAGE TYPE: ICD-10-CM

## 2022-04-10 DIAGNOSIS — D64.9 SYMPTOMATIC ANEMIA: ICD-10-CM

## 2022-04-10 DIAGNOSIS — I71.40 ABDOMINAL AORTIC ANEURYSM (AAA) WITHOUT RUPTURE: Chronic | ICD-10-CM

## 2022-04-10 DIAGNOSIS — E78.2 MIXED HYPERLIPIDEMIA: Chronic | ICD-10-CM

## 2022-04-10 DIAGNOSIS — Z72.0 TOBACCO ABUSE: Chronic | ICD-10-CM

## 2022-04-10 DIAGNOSIS — D50.0 IRON DEFICIENCY ANEMIA DUE TO CHRONIC BLOOD LOSS: ICD-10-CM

## 2022-04-10 DIAGNOSIS — D64.9 ANEMIA: ICD-10-CM

## 2022-04-10 PROBLEM — I48.91 ATRIAL FIBRILLATION: Chronic | Status: ACTIVE | Noted: 2022-04-10

## 2022-04-10 PROBLEM — I48.91 ATRIAL FIBRILLATION: Status: ACTIVE | Noted: 2022-04-10

## 2022-04-10 LAB
ABO + RH BLD: NORMAL
ALBUMIN SERPL BCP-MCNC: 3.1 G/DL (ref 3.5–5.2)
ALP SERPL-CCNC: 79 U/L (ref 55–135)
ALT SERPL W/O P-5'-P-CCNC: 12 U/L (ref 10–44)
ANION GAP SERPL CALC-SCNC: 10 MMOL/L (ref 8–16)
ANISOCYTOSIS BLD QL SMEAR: SLIGHT
AST SERPL-CCNC: 14 U/L (ref 10–40)
BASOPHILS # BLD AUTO: 0.04 K/UL (ref 0–0.2)
BASOPHILS # BLD AUTO: 0.04 K/UL (ref 0–0.2)
BASOPHILS NFR BLD: 0.4 % (ref 0–1.9)
BASOPHILS NFR BLD: 0.5 % (ref 0–1.9)
BILIRUB SERPL-MCNC: 0.4 MG/DL (ref 0.1–1)
BLD GP AB SCN CELLS X3 SERPL QL: NORMAL
BLD PROD TYP BPU: NORMAL
BLD PROD TYP BPU: NORMAL
BLOOD UNIT EXPIRATION DATE: NORMAL
BLOOD UNIT EXPIRATION DATE: NORMAL
BLOOD UNIT TYPE CODE: 6200
BLOOD UNIT TYPE CODE: 6200
BLOOD UNIT TYPE: NORMAL
BLOOD UNIT TYPE: NORMAL
BUN SERPL-MCNC: 51 MG/DL (ref 8–23)
CALCIUM SERPL-MCNC: 8.3 MG/DL (ref 8.7–10.5)
CHLORIDE SERPL-SCNC: 110 MMOL/L (ref 95–110)
CO2 SERPL-SCNC: 20 MMOL/L (ref 23–29)
CODING SYSTEM: NORMAL
CODING SYSTEM: NORMAL
CREAT SERPL-MCNC: 3.4 MG/DL (ref 0.5–1.4)
DACRYOCYTES BLD QL SMEAR: ABNORMAL
DIFFERENTIAL METHOD: ABNORMAL
DIFFERENTIAL METHOD: ABNORMAL
DISPENSE STATUS: NORMAL
DISPENSE STATUS: NORMAL
EOSINOPHIL # BLD AUTO: 0 K/UL (ref 0–0.5)
EOSINOPHIL # BLD AUTO: 0 K/UL (ref 0–0.5)
EOSINOPHIL NFR BLD: 0.1 % (ref 0–8)
EOSINOPHIL NFR BLD: 0.4 % (ref 0–8)
ERYTHROCYTE [DISTWIDTH] IN BLOOD BY AUTOMATED COUNT: 17.8 % (ref 11.5–14.5)
ERYTHROCYTE [DISTWIDTH] IN BLOOD BY AUTOMATED COUNT: 17.9 % (ref 11.5–14.5)
EST. GFR  (AFRICAN AMERICAN): 19 ML/MIN/1.73 M^2
EST. GFR  (NON AFRICAN AMERICAN): 16 ML/MIN/1.73 M^2
FERRITIN SERPL-MCNC: 10 NG/ML (ref 20–300)
GLUCOSE SERPL-MCNC: 118 MG/DL (ref 70–110)
HCT VFR BLD AUTO: 14.2 % (ref 40–54)
HCT VFR BLD AUTO: 15.5 % (ref 40–54)
HGB BLD-MCNC: 3.8 G/DL (ref 14–18)
HGB BLD-MCNC: 4.2 G/DL (ref 14–18)
HYPOCHROMIA BLD QL SMEAR: ABNORMAL
IMM GRANULOCYTES # BLD AUTO: 0.03 K/UL (ref 0–0.04)
IMM GRANULOCYTES # BLD AUTO: 0.05 K/UL (ref 0–0.04)
IMM GRANULOCYTES NFR BLD AUTO: 0.4 % (ref 0–0.5)
IMM GRANULOCYTES NFR BLD AUTO: 0.6 % (ref 0–0.5)
LIPASE SERPL-CCNC: 78 U/L (ref 4–60)
LYMPHOCYTES # BLD AUTO: 0.5 K/UL (ref 1–4.8)
LYMPHOCYTES # BLD AUTO: 0.5 K/UL (ref 1–4.8)
LYMPHOCYTES NFR BLD: 5.1 % (ref 18–48)
LYMPHOCYTES NFR BLD: 6.3 % (ref 18–48)
MCH RBC QN AUTO: 21 PG (ref 27–31)
MCH RBC QN AUTO: 21.1 PG (ref 27–31)
MCHC RBC AUTO-ENTMCNC: 26.8 G/DL (ref 32–36)
MCHC RBC AUTO-ENTMCNC: 27.1 G/DL (ref 32–36)
MCV RBC AUTO: 78 FL (ref 82–98)
MCV RBC AUTO: 79 FL (ref 82–98)
MONOCYTES # BLD AUTO: 0.5 K/UL (ref 0.3–1)
MONOCYTES # BLD AUTO: 0.6 K/UL (ref 0.3–1)
MONOCYTES NFR BLD: 6.1 % (ref 4–15)
MONOCYTES NFR BLD: 6.9 % (ref 4–15)
NEUTROPHILS # BLD AUTO: 7.1 K/UL (ref 1.8–7.7)
NEUTROPHILS # BLD AUTO: 7.8 K/UL (ref 1.8–7.7)
NEUTROPHILS NFR BLD: 86.6 % (ref 38–73)
NEUTROPHILS NFR BLD: 86.6 % (ref 38–73)
NRBC BLD-RTO: 0 /100 WBC
NRBC BLD-RTO: 0 /100 WBC
NUM UNITS TRANS PACKED RBC: NORMAL
NUM UNITS TRANS PACKED RBC: NORMAL
OB PNL STL: POSITIVE
OVALOCYTES BLD QL SMEAR: ABNORMAL
PLATELET # BLD AUTO: 144 K/UL (ref 150–450)
PLATELET # BLD AUTO: 188 K/UL (ref 150–450)
PLATELET BLD QL SMEAR: ABNORMAL
PMV BLD AUTO: 11 FL (ref 9.2–12.9)
PMV BLD AUTO: 11.9 FL (ref 9.2–12.9)
POIKILOCYTOSIS BLD QL SMEAR: ABNORMAL
POLYCHROMASIA BLD QL SMEAR: ABNORMAL
POTASSIUM SERPL-SCNC: 4.6 MMOL/L (ref 3.5–5.1)
PROT SERPL-MCNC: 6 G/DL (ref 6–8.4)
RBC # BLD AUTO: 1.81 M/UL (ref 4.6–6.2)
RBC # BLD AUTO: 1.99 M/UL (ref 4.6–6.2)
SODIUM SERPL-SCNC: 140 MMOL/L (ref 136–145)
TARGETS BLD QL SMEAR: ABNORMAL
TROPONIN I SERPL DL<=0.01 NG/ML-MCNC: 0.04 NG/ML (ref 0–0.03)
WBC # BLD AUTO: 8.14 K/UL (ref 3.9–12.7)
WBC # BLD AUTO: 9.05 K/UL (ref 3.9–12.7)

## 2022-04-10 PROCEDURE — 93010 EKG 12-LEAD: ICD-10-PCS | Mod: ,,, | Performed by: INTERNAL MEDICINE

## 2022-04-10 PROCEDURE — 83690 ASSAY OF LIPASE: CPT | Performed by: EMERGENCY MEDICINE

## 2022-04-10 PROCEDURE — 63600175 PHARM REV CODE 636 W HCPCS: Performed by: EMERGENCY MEDICINE

## 2022-04-10 PROCEDURE — 85025 COMPLETE CBC W/AUTO DIFF WBC: CPT | Performed by: EMERGENCY MEDICINE

## 2022-04-10 PROCEDURE — 12000002 HC ACUTE/MED SURGE SEMI-PRIVATE ROOM

## 2022-04-10 PROCEDURE — 99291 CRITICAL CARE FIRST HOUR: CPT | Mod: 25

## 2022-04-10 PROCEDURE — 96361 HYDRATE IV INFUSION ADD-ON: CPT

## 2022-04-10 PROCEDURE — 80053 COMPREHEN METABOLIC PANEL: CPT | Performed by: EMERGENCY MEDICINE

## 2022-04-10 PROCEDURE — 63600175 PHARM REV CODE 636 W HCPCS: Performed by: STUDENT IN AN ORGANIZED HEALTH CARE EDUCATION/TRAINING PROGRAM

## 2022-04-10 PROCEDURE — 86920 COMPATIBILITY TEST SPIN: CPT | Performed by: STUDENT IN AN ORGANIZED HEALTH CARE EDUCATION/TRAINING PROGRAM

## 2022-04-10 PROCEDURE — 93005 ELECTROCARDIOGRAM TRACING: CPT

## 2022-04-10 PROCEDURE — 36430 TRANSFUSION BLD/BLD COMPNT: CPT

## 2022-04-10 PROCEDURE — 82272 OCCULT BLD FECES 1-3 TESTS: CPT | Performed by: EMERGENCY MEDICINE

## 2022-04-10 PROCEDURE — 94761 N-INVAS EAR/PLS OXIMETRY MLT: CPT

## 2022-04-10 PROCEDURE — 93010 ELECTROCARDIOGRAM REPORT: CPT | Mod: ,,, | Performed by: INTERNAL MEDICINE

## 2022-04-10 PROCEDURE — P9021 RED BLOOD CELLS UNIT: HCPCS | Performed by: STUDENT IN AN ORGANIZED HEALTH CARE EDUCATION/TRAINING PROGRAM

## 2022-04-10 PROCEDURE — 96374 THER/PROPH/DIAG INJ IV PUSH: CPT

## 2022-04-10 PROCEDURE — 82728 ASSAY OF FERRITIN: CPT | Performed by: STUDENT IN AN ORGANIZED HEALTH CARE EDUCATION/TRAINING PROGRAM

## 2022-04-10 PROCEDURE — 84484 ASSAY OF TROPONIN QUANT: CPT | Performed by: STUDENT IN AN ORGANIZED HEALTH CARE EDUCATION/TRAINING PROGRAM

## 2022-04-10 PROCEDURE — 25000003 PHARM REV CODE 250: Performed by: EMERGENCY MEDICINE

## 2022-04-10 PROCEDURE — 86920 COMPATIBILITY TEST SPIN: CPT | Performed by: EMERGENCY MEDICINE

## 2022-04-10 PROCEDURE — P9016 RBC LEUKOCYTES REDUCED: HCPCS | Performed by: EMERGENCY MEDICINE

## 2022-04-10 PROCEDURE — 86900 BLOOD TYPING SEROLOGIC ABO: CPT | Performed by: EMERGENCY MEDICINE

## 2022-04-10 PROCEDURE — 84466 ASSAY OF TRANSFERRIN: CPT | Performed by: STUDENT IN AN ORGANIZED HEALTH CARE EDUCATION/TRAINING PROGRAM

## 2022-04-10 PROCEDURE — 20000000 HC ICU ROOM

## 2022-04-10 PROCEDURE — 86850 RBC ANTIBODY SCREEN: CPT | Performed by: EMERGENCY MEDICINE

## 2022-04-10 PROCEDURE — C9113 INJ PANTOPRAZOLE SODIUM, VIA: HCPCS | Performed by: STUDENT IN AN ORGANIZED HEALTH CARE EDUCATION/TRAINING PROGRAM

## 2022-04-10 RX ORDER — PANTOPRAZOLE SODIUM 40 MG/10ML
40 INJECTION, POWDER, LYOPHILIZED, FOR SOLUTION INTRAVENOUS 2 TIMES DAILY
Status: DISCONTINUED | OUTPATIENT
Start: 2022-04-11 | End: 2022-04-13 | Stop reason: HOSPADM

## 2022-04-10 RX ORDER — SODIUM CHLORIDE 0.9 % (FLUSH) 0.9 %
10 SYRINGE (ML) INJECTION
Status: DISCONTINUED | OUTPATIENT
Start: 2022-04-10 | End: 2022-04-13 | Stop reason: HOSPADM

## 2022-04-10 RX ORDER — AMIODARONE HYDROCHLORIDE 100 MG/1
100 TABLET ORAL DAILY
Status: DISCONTINUED | OUTPATIENT
Start: 2022-04-11 | End: 2022-04-13 | Stop reason: HOSPADM

## 2022-04-10 RX ORDER — HYDROCODONE BITARTRATE AND ACETAMINOPHEN 500; 5 MG/1; MG/1
TABLET ORAL
Status: DISCONTINUED | OUTPATIENT
Start: 2022-04-10 | End: 2022-04-13 | Stop reason: HOSPADM

## 2022-04-10 RX ORDER — CHLORTHALIDONE 25 MG/1
25 TABLET ORAL DAILY
Status: DISCONTINUED | OUTPATIENT
Start: 2022-04-11 | End: 2022-04-11

## 2022-04-10 RX ORDER — ONDANSETRON 2 MG/ML
4 INJECTION INTRAMUSCULAR; INTRAVENOUS
Status: COMPLETED | OUTPATIENT
Start: 2022-04-10 | End: 2022-04-10

## 2022-04-10 RX ORDER — ACETAMINOPHEN 325 MG/1
650 TABLET ORAL EVERY 6 HOURS PRN
Status: DISCONTINUED | OUTPATIENT
Start: 2022-04-10 | End: 2022-04-13 | Stop reason: HOSPADM

## 2022-04-10 RX ORDER — CILOSTAZOL 50 MG/1
100 TABLET ORAL
Status: DISCONTINUED | OUTPATIENT
Start: 2022-04-11 | End: 2022-04-10

## 2022-04-10 RX ORDER — PANTOPRAZOLE SODIUM 40 MG/10ML
80 INJECTION, POWDER, LYOPHILIZED, FOR SOLUTION INTRAVENOUS ONCE
Status: COMPLETED | OUTPATIENT
Start: 2022-04-10 | End: 2022-04-10

## 2022-04-10 RX ORDER — ATORVASTATIN CALCIUM 40 MG/1
80 TABLET, FILM COATED ORAL DAILY
Refills: 3 | Status: DISCONTINUED | OUTPATIENT
Start: 2022-04-11 | End: 2022-04-13 | Stop reason: HOSPADM

## 2022-04-10 RX ADMIN — PANTOPRAZOLE SODIUM 80 MG: 40 INJECTION, POWDER, FOR SOLUTION INTRAVENOUS at 10:04

## 2022-04-10 RX ADMIN — ONDANSETRON 4 MG: 2 INJECTION INTRAMUSCULAR; INTRAVENOUS at 05:04

## 2022-04-10 RX ADMIN — SODIUM CHLORIDE 1000 ML: 0.9 INJECTION, SOLUTION INTRAVENOUS at 05:04

## 2022-04-10 NOTE — PHARMACY MED REC
"[facility]           Pharmacy  Admission Medication History     The home medication history was taken by Taisha Orantes.      Medication history obtained from Medications listed below were obtained from: Patient/family    Based on information gathered for medication list, you may go to "Admission" then "Reconcile Home Medications" tabs to review and/or act upon those items.      The home medication list has been updated by the Pharmacy department.    Please read ALL comments highlighted in yellow.    Please address this information as you see fit.     Feel free to contact us if you have any questions or require assistance.        No current facility-administered medications on file prior to encounter.     Current Outpatient Medications on File Prior to Encounter   Medication Sig Dispense Refill    amiodarone (PACERONE) 200 MG Tab Take 0.5 tablets (100 mg total) by mouth once daily. 45 tablet 3    amLODIPine (NORVASC) 5 MG tablet TAKE 1 TABLET EVERY DAY (Patient taking differently: Take 5 mg by mouth once daily.) 90 tablet 3    chlorthalidone (HYGROTEN) 25 MG Tab TAKE 1 TABLET ONE TIME DAILY (Patient taking differently: Take 25 mg by mouth once daily. TAKE 1 TABLET ONE TIME DAILY) 90 tablet 3    irbesartan (AVAPRO) 150 MG tablet Take 1 tablet (150 mg total) by mouth every evening. 90 tablet 3    metoprolol succinate (TOPROL-XL) 25 MG 24 hr tablet Take 1 tablet (25 mg total) by mouth once daily. 90 tablet 3    nitroGLYCERIN (NITROSTAT) 0.4 MG SL tablet Place 1 tablet (0.4 mg total) under the tongue every 5 (five) minutes as needed. 25 tablet 5    omeprazole (PRILOSEC) 40 MG capsule TAKE 1 CAPSULE EVERY MORNING (Patient taking differently: Take 40 mg by mouth every morning.) 90 capsule 3    rivaroxaban (XARELTO) 20 mg Tab Take 1 tablet (20 mg total) by mouth once daily. 30 tablet 11    cilostazol (PLETAL) 100 MG Tab TAKE 1 TABLET (100 MG TOTAL) BY MOUTH 2 (TWO) TIMES DAILY BEFORE " MEALS. 60 tablet 11    clopidogreL (PLAVIX) 75 mg tablet Take 1 tablet (75 mg total) by mouth once daily. 90 tablet 3    rosuvastatin (CRESTOR) 40 MG Tab TAKE 1 TABLET (40 MG TOTAL) BY MOUTH ONCE DAILY. 90 tablet 3       Please address this information as you see fit.  Feel free to contact us if you have any questions or require assistance.    Taisha Orantes  [phone]                  .

## 2022-04-10 NOTE — ED NOTES
Received report of critical lab results from [facility], Lab. Patient Hgb=4.2 Hct=15.5. Dr. Rosado made aware. New orders noted.

## 2022-04-10 NOTE — ED PROVIDER NOTES
Encounter Date: 4/10/2022    SCRIBE #1 NOTE: I, Wayne Tran, am scribing for, and in the presence of, Zeynep Rosado MD.       History     Chief Complaint   Patient presents with    Fatigue     Pt presents to ED per EMS from home with c/o generalized weakness x1 week that has been progressively worse. No further complains. AO x3.      Rex Song is a 77 y.o. male who  has a past medical history of MS, CHF (congestive heart failure), Hypertension, and PVD (peripheral vascular disease).  The patient presents via EMS to the ED due to Fatigue.   Patient's wife reports worsening weakness for one week. She also reports vomiting and diarrhea that started today. She states that the patient had a near-syncopal episode today but did not lose consciousness. Patient denies chest pain, abdominal pain, SOB, or other associated symptoms at this time. He denies any sick contacts.      The history is provided by the EMS personnel, the patient and the spouse.     Review of patient's allergies indicates:   Allergen Reactions    Cortisone      Other reaction(s): Itching     Past Medical History:   Diagnosis Date    CHF (congestive heart failure)     Hypertension     Personal history of colonic polyps 2005    PVD (peripheral vascular disease)      Past Surgical History:   Procedure Laterality Date    CORONARY ANGIOPLASTY WITH STENT PLACEMENT  08/18/2000    LCX stent/ PDA stent     Family History   Problem Relation Age of Onset    Prostate cancer Brother 59     Social History     Tobacco Use    Smoking status: Former Smoker     Packs/day: 0.25     Types: Cigarettes    Smokeless tobacco: Never Used   Substance Use Topics    Alcohol use: No     Alcohol/week: 0.0 standard drinks     Review of Systems   Constitutional: Positive for fatigue. Negative for chills and fever.   HENT: Negative for sore throat.    Eyes: Negative for redness.   Respiratory: Negative for shortness of breath.    Cardiovascular: Negative for chest  pain.   Gastrointestinal: Positive for diarrhea and vomiting. Negative for abdominal pain and nausea.   Genitourinary: Negative for dysuria and hematuria.   Musculoskeletal: Negative for back pain.   Skin: Negative for rash.   Neurological: Positive for weakness. Negative for headaches.        Near-syncope.       Physical Exam     Initial Vitals   BP Pulse Resp Temp SpO2   04/10/22 1634 04/10/22 1634 04/10/22 1634 04/10/22 1635 04/10/22 1634   (!) 110/36 64 20 97.8 °F (36.6 °C) 98 %      MAP       --                Physical Exam    Nursing note and vitals reviewed.  Constitutional: He appears well-developed and well-nourished.   HENT:   Head: Normocephalic and atraumatic.   Eyes: EOM are normal. Pupils are equal, round, and reactive to light.   Neck: Neck supple.   Normal range of motion.  Cardiovascular: Normal rate, regular rhythm, normal heart sounds and intact distal pulses.   Pulmonary/Chest: Breath sounds normal.   Abdominal: Abdomen is soft. Bowel sounds are normal. He exhibits no distension. There is no abdominal tenderness. There is no rebound and no guarding.   Genitourinary: Rectum:      Guaiac result positive.   Guaiac positive stool. : Acceptable.   Genitourinary Comments: Scant marroon stool in the rectal vault     Musculoskeletal:         General: No edema. Normal range of motion.      Cervical back: Normal range of motion and neck supple.     Neurological: He is alert and oriented to person, place, and time.   Skin: Skin is warm and dry.   Psychiatric: He has a normal mood and affect. His behavior is normal. Judgment and thought content normal.           ED Course   Critical Care    Date/Time: 4/10/2022 7:24 PM  Performed by: Zeynep Rosado MD  Authorized by: Zeynep Rosado MD   Total critical care time (exclusive of procedural time) : 40 minutes  Critical care time was exclusive of separately billable procedures and treating other patients and teaching time.  Critical care was  necessary to treat or prevent imminent or life-threatening deterioration of the following conditions: circulatory failure, cardiac failure and shock.  Critical care was time spent personally by me on the following activities: development of treatment plan with patient or surrogate, examination of patient, ordering and performing treatments and interventions, ordering and review of radiographic studies, re-evaluation of patient's condition, discussions with consultants, evaluation of patient's response to treatment, obtaining history from patient or surrogate, ordering and review of laboratory studies, pulse oximetry and review of old charts.        Labs Reviewed   CBC W/ AUTO DIFFERENTIAL - Abnormal; Notable for the following components:       Result Value    RBC 1.99 (*)     Hemoglobin 4.2 (*)     Hematocrit 15.5 (*)     MCV 78 (*)     MCH 21.1 (*)     MCHC 27.1 (*)     RDW 17.8 (*)     Immature Granulocytes 0.6 (*)     Gran # (ANC) 7.8 (*)     Immature Grans (Abs) 0.05 (*)     Lymph # 0.5 (*)     Gran % 86.6 (*)     Lymph % 5.1 (*)     All other components within normal limits   COMPREHENSIVE METABOLIC PANEL - Abnormal; Notable for the following components:    CO2 20 (*)     Glucose 118 (*)     BUN 51 (*)     Creatinine 3.4 (*)     Calcium 8.3 (*)     Albumin 3.1 (*)     eGFR if  19 (*)     eGFR if non  16 (*)     All other components within normal limits   LIPASE - Abnormal; Notable for the following components:    Lipase 78 (*)     All other components within normal limits   OCCULT BLOOD X 1, STOOL - Abnormal; Notable for the following components:    Occult Blood Positive (*)     All other components within normal limits   URINALYSIS, REFLEX TO URINE CULTURE   CBC W/ AUTO DIFFERENTIAL   TYPE & SCREEN   PREPARE RBC SOFT          Imaging Results    None          Medications   ondansetron injection 4 mg (4 mg Intravenous Given 4/10/22 5697)   sodium chloride 0.9% bolus 1,000 mL  (1,000 mLs Intravenous New Bag 4/10/22 1706)              Scribe Attestation:   Scribe #1: I performed the above scribed service and the documentation accurately describes the services I performed. I attest to the accuracy of the note.        ED Course as of 04/10/22 1924   Sun Apr 10, 2022   1918 Case discussed with [facility].  They will admit the patient. [facility] consulted for acute GI bleed [ST]      ED Course User Index  [ST] Zeynep Rosado MD             Clinical Impression:   Final diagnoses:  [R53.1] Weakness (Primary)  [D62] Acute blood loss anemia          ED Disposition Condition    Observation           I, Zeynep Rosado, personally performed the services described in this documentation. All medical record entries made by the scribe were at my direction and in my presence.  I have reviewed the chart and agree that the record reflects my personal performance and is accurate and complete. Zeynep Rosado M.D. 7:24 PM04/10/2022     Zeynep Rosado MD  04/10/22 1925

## 2022-04-11 ENCOUNTER — ANESTHESIA (OUTPATIENT)
Dept: ENDOSCOPY | Facility: HOSPITAL | Age: 77
DRG: 813 | End: 2022-04-11
Payer: MEDICARE

## 2022-04-11 ENCOUNTER — TELEPHONE (OUTPATIENT)
Dept: CARDIOLOGY | Facility: CLINIC | Age: 77
End: 2022-04-11
Payer: MEDICARE

## 2022-04-11 ENCOUNTER — ANESTHESIA EVENT (OUTPATIENT)
Dept: ENDOSCOPY | Facility: HOSPITAL | Age: 77
DRG: 813 | End: 2022-04-11
Payer: MEDICARE

## 2022-04-11 PROBLEM — E03.9 HYPOTHYROIDISM: Status: ACTIVE | Noted: 2022-04-11

## 2022-04-11 PROBLEM — D69.6 THROMBOCYTOPENIA: Status: ACTIVE | Noted: 2022-04-11

## 2022-04-11 LAB
ALBUMIN SERPL BCP-MCNC: 3 G/DL (ref 3.5–5.2)
ALP SERPL-CCNC: 75 U/L (ref 55–135)
ALT SERPL W/O P-5'-P-CCNC: 15 U/L (ref 10–44)
ANION GAP SERPL CALC-SCNC: 9 MMOL/L (ref 8–16)
AST SERPL-CCNC: 19 U/L (ref 10–40)
BASOPHILS # BLD AUTO: 0.06 K/UL (ref 0–0.2)
BASOPHILS # BLD AUTO: 0.07 K/UL (ref 0–0.2)
BASOPHILS # BLD AUTO: 0.07 K/UL (ref 0–0.2)
BASOPHILS NFR BLD: 0.7 % (ref 0–1.9)
BASOPHILS NFR BLD: 0.8 % (ref 0–1.9)
BASOPHILS NFR BLD: 0.9 % (ref 0–1.9)
BILIRUB SERPL-MCNC: 1 MG/DL (ref 0.1–1)
BUN SERPL-MCNC: 51 MG/DL (ref 8–23)
CALCIUM SERPL-MCNC: 8.1 MG/DL (ref 8.7–10.5)
CHLORIDE SERPL-SCNC: 113 MMOL/L (ref 95–110)
CHOLEST SERPL-MCNC: 98 MG/DL (ref 120–199)
CHOLEST/HDLC SERPL: 3.4 {RATIO} (ref 2–5)
CO2 SERPL-SCNC: 20 MMOL/L (ref 23–29)
CREAT SERPL-MCNC: 3 MG/DL (ref 0.5–1.4)
DIFFERENTIAL METHOD: ABNORMAL
EOSINOPHIL # BLD AUTO: 0 K/UL (ref 0–0.5)
EOSINOPHIL # BLD AUTO: 0.1 K/UL (ref 0–0.5)
EOSINOPHIL # BLD AUTO: 0.1 K/UL (ref 0–0.5)
EOSINOPHIL NFR BLD: 0.4 % (ref 0–8)
EOSINOPHIL NFR BLD: 1.2 % (ref 0–8)
EOSINOPHIL NFR BLD: 1.2 % (ref 0–8)
ERYTHROCYTE [DISTWIDTH] IN BLOOD BY AUTOMATED COUNT: 16.4 % (ref 11.5–14.5)
ERYTHROCYTE [DISTWIDTH] IN BLOOD BY AUTOMATED COUNT: 16.7 % (ref 11.5–14.5)
ERYTHROCYTE [DISTWIDTH] IN BLOOD BY AUTOMATED COUNT: 16.7 % (ref 11.5–14.5)
EST. GFR  (AFRICAN AMERICAN): 22 ML/MIN/1.73 M^2
EST. GFR  (NON AFRICAN AMERICAN): 19 ML/MIN/1.73 M^2
ESTIMATED AVG GLUCOSE: 103 MG/DL (ref 68–131)
GLUCOSE SERPL-MCNC: 79 MG/DL (ref 70–110)
HBA1C MFR BLD: 5.2 % (ref 4–5.6)
HCT VFR BLD AUTO: 26.4 % (ref 40–54)
HCT VFR BLD AUTO: 26.6 % (ref 40–54)
HCT VFR BLD AUTO: 26.9 % (ref 40–54)
HDLC SERPL-MCNC: 29 MG/DL (ref 40–75)
HDLC SERPL: 29.6 % (ref 20–50)
HGB BLD-MCNC: 8.1 G/DL (ref 14–18)
HGB BLD-MCNC: 8.4 G/DL (ref 14–18)
HGB BLD-MCNC: 8.4 G/DL (ref 14–18)
IMM GRANULOCYTES # BLD AUTO: 0.03 K/UL (ref 0–0.04)
IMM GRANULOCYTES # BLD AUTO: 0.04 K/UL (ref 0–0.04)
IMM GRANULOCYTES # BLD AUTO: 0.05 K/UL (ref 0–0.04)
IMM GRANULOCYTES NFR BLD AUTO: 0.4 % (ref 0–0.5)
IMM GRANULOCYTES NFR BLD AUTO: 0.5 % (ref 0–0.5)
IMM GRANULOCYTES NFR BLD AUTO: 0.5 % (ref 0–0.5)
IRON SERPL-MCNC: 10 UG/DL (ref 45–160)
LDLC SERPL CALC-MCNC: 43.6 MG/DL (ref 63–159)
LYMPHOCYTES # BLD AUTO: 0.7 K/UL (ref 1–4.8)
LYMPHOCYTES # BLD AUTO: 1 K/UL (ref 1–4.8)
LYMPHOCYTES # BLD AUTO: 1 K/UL (ref 1–4.8)
LYMPHOCYTES NFR BLD: 10.2 % (ref 18–48)
LYMPHOCYTES NFR BLD: 11.3 % (ref 18–48)
LYMPHOCYTES NFR BLD: 8.9 % (ref 18–48)
MAGNESIUM SERPL-MCNC: 2.1 MG/DL (ref 1.6–2.6)
MCH RBC QN AUTO: 24.9 PG (ref 27–31)
MCH RBC QN AUTO: 24.9 PG (ref 27–31)
MCH RBC QN AUTO: 25 PG (ref 27–31)
MCHC RBC AUTO-ENTMCNC: 30.7 G/DL (ref 32–36)
MCHC RBC AUTO-ENTMCNC: 31.2 G/DL (ref 32–36)
MCHC RBC AUTO-ENTMCNC: 31.6 G/DL (ref 32–36)
MCV RBC AUTO: 79 FL (ref 82–98)
MCV RBC AUTO: 80 FL (ref 82–98)
MCV RBC AUTO: 82 FL (ref 82–98)
MONOCYTES # BLD AUTO: 0.6 K/UL (ref 0.3–1)
MONOCYTES # BLD AUTO: 0.7 K/UL (ref 0.3–1)
MONOCYTES # BLD AUTO: 0.8 K/UL (ref 0.3–1)
MONOCYTES NFR BLD: 7.2 % (ref 4–15)
MONOCYTES NFR BLD: 8.3 % (ref 4–15)
MONOCYTES NFR BLD: 8.9 % (ref 4–15)
NEUTROPHILS # BLD AUTO: 5.4 K/UL (ref 1.8–7.7)
NEUTROPHILS # BLD AUTO: 6.8 K/UL (ref 1.8–7.7)
NEUTROPHILS # BLD AUTO: 8.8 K/UL (ref 1.8–7.7)
NEUTROPHILS NFR BLD: 77.9 % (ref 38–73)
NEUTROPHILS NFR BLD: 78.4 % (ref 38–73)
NEUTROPHILS NFR BLD: 82.3 % (ref 38–73)
NONHDLC SERPL-MCNC: 69 MG/DL
NRBC BLD-RTO: 0 /100 WBC
PHOSPHATE SERPL-MCNC: 3.6 MG/DL (ref 2.7–4.5)
PLATELET # BLD AUTO: 120 K/UL (ref 150–450)
PLATELET # BLD AUTO: 128 K/UL (ref 150–450)
PLATELET # BLD AUTO: 133 K/UL (ref 150–450)
PMV BLD AUTO: 10.9 FL (ref 9.2–12.9)
PMV BLD AUTO: 11 FL (ref 9.2–12.9)
PMV BLD AUTO: 11.3 FL (ref 9.2–12.9)
POTASSIUM SERPL-SCNC: 4.4 MMOL/L (ref 3.5–5.1)
PROT SERPL-MCNC: 5.7 G/DL (ref 6–8.4)
RBC # BLD AUTO: 3.24 M/UL (ref 4.6–6.2)
RBC # BLD AUTO: 3.37 M/UL (ref 4.6–6.2)
RBC # BLD AUTO: 3.38 M/UL (ref 4.6–6.2)
SATURATED IRON: 3 % (ref 20–50)
SODIUM SERPL-SCNC: 142 MMOL/L (ref 136–145)
T4 FREE SERPL-MCNC: 0.47 NG/DL (ref 0.71–1.51)
TOTAL IRON BINDING CAPACITY: 334 UG/DL (ref 250–450)
TRANSFERRIN SERPL-MCNC: 226 MG/DL (ref 200–375)
TRIGL SERPL-MCNC: 127 MG/DL (ref 30–150)
TROPONIN I SERPL DL<=0.01 NG/ML-MCNC: 0.04 NG/ML (ref 0–0.03)
TSH SERPL DL<=0.005 MIU/L-ACNC: 23.84 UIU/ML (ref 0.4–4)
WBC # BLD AUTO: 10.73 K/UL (ref 3.9–12.7)
WBC # BLD AUTO: 6.88 K/UL (ref 3.9–12.7)
WBC # BLD AUTO: 8.68 K/UL (ref 3.9–12.7)

## 2022-04-11 PROCEDURE — 37000008 HC ANESTHESIA 1ST 15 MINUTES: Performed by: INTERNAL MEDICINE

## 2022-04-11 PROCEDURE — 84439 ASSAY OF FREE THYROXINE: CPT | Performed by: STUDENT IN AN ORGANIZED HEALTH CARE EDUCATION/TRAINING PROGRAM

## 2022-04-11 PROCEDURE — 83036 HEMOGLOBIN GLYCOSYLATED A1C: CPT | Performed by: STUDENT IN AN ORGANIZED HEALTH CARE EDUCATION/TRAINING PROGRAM

## 2022-04-11 PROCEDURE — 85025 COMPLETE CBC W/AUTO DIFF WBC: CPT | Mod: 91 | Performed by: STUDENT IN AN ORGANIZED HEALTH CARE EDUCATION/TRAINING PROGRAM

## 2022-04-11 PROCEDURE — 97161 PT EVAL LOW COMPLEX 20 MIN: CPT

## 2022-04-11 PROCEDURE — 37000009 HC ANESTHESIA EA ADD 15 MINS: Performed by: INTERNAL MEDICINE

## 2022-04-11 PROCEDURE — 43235 PR EGD, FLEX, DIAGNOSTIC: ICD-10-PCS | Mod: NSCH,,, | Performed by: INTERNAL MEDICINE

## 2022-04-11 PROCEDURE — 63600175 PHARM REV CODE 636 W HCPCS: Performed by: STUDENT IN AN ORGANIZED HEALTH CARE EDUCATION/TRAINING PROGRAM

## 2022-04-11 PROCEDURE — 63600175 PHARM REV CODE 636 W HCPCS: Performed by: INTERNAL MEDICINE

## 2022-04-11 PROCEDURE — 84100 ASSAY OF PHOSPHORUS: CPT | Performed by: STUDENT IN AN ORGANIZED HEALTH CARE EDUCATION/TRAINING PROGRAM

## 2022-04-11 PROCEDURE — 84484 ASSAY OF TROPONIN QUANT: CPT | Performed by: STUDENT IN AN ORGANIZED HEALTH CARE EDUCATION/TRAINING PROGRAM

## 2022-04-11 PROCEDURE — 80053 COMPREHEN METABOLIC PANEL: CPT | Performed by: STUDENT IN AN ORGANIZED HEALTH CARE EDUCATION/TRAINING PROGRAM

## 2022-04-11 PROCEDURE — 25000003 PHARM REV CODE 250: Performed by: INTERNAL MEDICINE

## 2022-04-11 PROCEDURE — 80061 LIPID PANEL: CPT | Performed by: STUDENT IN AN ORGANIZED HEALTH CARE EDUCATION/TRAINING PROGRAM

## 2022-04-11 PROCEDURE — 94760 N-INVAS EAR/PLS OXIMETRY 1: CPT

## 2022-04-11 PROCEDURE — 99900035 HC TECH TIME PER 15 MIN (STAT)

## 2022-04-11 PROCEDURE — 97165 OT EVAL LOW COMPLEX 30 MIN: CPT

## 2022-04-11 PROCEDURE — 51798 US URINE CAPACITY MEASURE: CPT

## 2022-04-11 PROCEDURE — 36415 COLL VENOUS BLD VENIPUNCTURE: CPT | Performed by: STUDENT IN AN ORGANIZED HEALTH CARE EDUCATION/TRAINING PROGRAM

## 2022-04-11 PROCEDURE — 83735 ASSAY OF MAGNESIUM: CPT | Performed by: STUDENT IN AN ORGANIZED HEALTH CARE EDUCATION/TRAINING PROGRAM

## 2022-04-11 PROCEDURE — 94761 N-INVAS EAR/PLS OXIMETRY MLT: CPT

## 2022-04-11 PROCEDURE — C9113 INJ PANTOPRAZOLE SODIUM, VIA: HCPCS | Performed by: STUDENT IN AN ORGANIZED HEALTH CARE EDUCATION/TRAINING PROGRAM

## 2022-04-11 PROCEDURE — 25000003 PHARM REV CODE 250: Performed by: STUDENT IN AN ORGANIZED HEALTH CARE EDUCATION/TRAINING PROGRAM

## 2022-04-11 PROCEDURE — 84443 ASSAY THYROID STIM HORMONE: CPT | Performed by: STUDENT IN AN ORGANIZED HEALTH CARE EDUCATION/TRAINING PROGRAM

## 2022-04-11 PROCEDURE — 43235 EGD DIAGNOSTIC BRUSH WASH: CPT | Performed by: INTERNAL MEDICINE

## 2022-04-11 PROCEDURE — 27000221 HC OXYGEN, UP TO 24 HOURS

## 2022-04-11 PROCEDURE — 11000001 HC ACUTE MED/SURG PRIVATE ROOM

## 2022-04-11 PROCEDURE — 63600175 PHARM REV CODE 636 W HCPCS: Performed by: NURSE ANESTHETIST, CERTIFIED REGISTERED

## 2022-04-11 PROCEDURE — 43235 EGD DIAGNOSTIC BRUSH WASH: CPT | Mod: NSCH,,, | Performed by: INTERNAL MEDICINE

## 2022-04-11 PROCEDURE — 25000003 PHARM REV CODE 250: Performed by: NURSE ANESTHETIST, CERTIFIED REGISTERED

## 2022-04-11 PROCEDURE — 97530 THERAPEUTIC ACTIVITIES: CPT

## 2022-04-11 RX ORDER — PHENYLEPHRINE HYDROCHLORIDE 10 MG/ML
INJECTION INTRAVENOUS
Status: DISCONTINUED | OUTPATIENT
Start: 2022-04-11 | End: 2022-04-11

## 2022-04-11 RX ORDER — PROPOFOL 10 MG/ML
VIAL (ML) INTRAVENOUS CONTINUOUS PRN
Status: DISCONTINUED | OUTPATIENT
Start: 2022-04-11 | End: 2022-04-11

## 2022-04-11 RX ORDER — SODIUM CHLORIDE, SODIUM LACTATE, POTASSIUM CHLORIDE, CALCIUM CHLORIDE 600; 310; 30; 20 MG/100ML; MG/100ML; MG/100ML; MG/100ML
INJECTION, SOLUTION INTRAVENOUS CONTINUOUS
Status: DISCONTINUED | OUTPATIENT
Start: 2022-04-11 | End: 2022-04-12

## 2022-04-11 RX ORDER — MUPIROCIN 20 MG/G
OINTMENT TOPICAL 2 TIMES DAILY
Status: DISCONTINUED | OUTPATIENT
Start: 2022-04-11 | End: 2022-04-13 | Stop reason: HOSPADM

## 2022-04-11 RX ORDER — POLYETHYLENE GLYCOL 3350, SODIUM SULFATE ANHYDROUS, SODIUM BICARBONATE, SODIUM CHLORIDE, POTASSIUM CHLORIDE 236; 22.74; 6.74; 5.86; 2.97 G/4L; G/4L; G/4L; G/4L; G/4L
4000 POWDER, FOR SOLUTION ORAL ONCE
Status: COMPLETED | OUTPATIENT
Start: 2022-04-11 | End: 2022-04-11

## 2022-04-11 RX ORDER — LIDOCAINE HCL/PF 100 MG/5ML
SYRINGE (ML) INTRAVENOUS
Status: DISCONTINUED | OUTPATIENT
Start: 2022-04-11 | End: 2022-04-11

## 2022-04-11 RX ORDER — ETOMIDATE 2 MG/ML
INJECTION INTRAVENOUS
Status: DISCONTINUED | OUTPATIENT
Start: 2022-04-11 | End: 2022-04-11

## 2022-04-11 RX ORDER — LEVOTHYROXINE SODIUM 25 UG/1
25 TABLET ORAL
Status: DISCONTINUED | OUTPATIENT
Start: 2022-04-11 | End: 2022-04-13 | Stop reason: HOSPADM

## 2022-04-11 RX ADMIN — PROPOFOL 150 MCG/KG/MIN: 10 INJECTION, EMULSION INTRAVENOUS at 04:04

## 2022-04-11 RX ADMIN — ETOMIDATE 2 MG: 2 INJECTION, SOLUTION INTRAVENOUS at 04:04

## 2022-04-11 RX ADMIN — LEVOTHYROXINE SODIUM 25 MCG: 25 TABLET ORAL at 08:04

## 2022-04-11 RX ADMIN — SODIUM CHLORIDE, SODIUM LACTATE, POTASSIUM CHLORIDE, AND CALCIUM CHLORIDE: .6; .31; .03; .02 INJECTION, SOLUTION INTRAVENOUS at 01:04

## 2022-04-11 RX ADMIN — LIDOCAINE HYDROCHLORIDE 60 MG: 20 INJECTION, SOLUTION INTRAVENOUS at 04:04

## 2022-04-11 RX ADMIN — PANTOPRAZOLE SODIUM 40 MG: 40 INJECTION, POWDER, LYOPHILIZED, FOR SOLUTION INTRAVENOUS at 09:04

## 2022-04-11 RX ADMIN — PHENYLEPHRINE HYDROCHLORIDE 200 MCG: 10 INJECTION INTRAVENOUS at 04:04

## 2022-04-11 RX ADMIN — MUPIROCIN: 20 OINTMENT TOPICAL at 08:04

## 2022-04-11 RX ADMIN — POLYETHYLENE GLYCOL 3350, SODIUM SULFATE ANHYDROUS, SODIUM BICARBONATE, SODIUM CHLORIDE, POTASSIUM CHLORIDE 4000 ML: 236; 22.74; 6.74; 5.86; 2.97 POWDER, FOR SOLUTION ORAL at 06:04

## 2022-04-11 RX ADMIN — ETOMIDATE 4 MG: 2 INJECTION, SOLUTION INTRAVENOUS at 04:04

## 2022-04-11 RX ADMIN — AMIODARONE HYDROCHLORIDE 100 MG: 100 TABLET ORAL at 08:04

## 2022-04-11 RX ADMIN — MUPIROCIN: 20 OINTMENT TOPICAL at 09:04

## 2022-04-11 RX ADMIN — PANTOPRAZOLE SODIUM 40 MG: 40 INJECTION, POWDER, LYOPHILIZED, FOR SOLUTION INTRAVENOUS at 08:04

## 2022-04-11 RX ADMIN — ATORVASTATIN CALCIUM 80 MG: 40 TABLET, FILM COATED ORAL at 08:04

## 2022-04-11 NOTE — CONSULTS
[facility]    CC: Black stool     HPI 77 y.o. male with history of AAA, PVD, CKD, Afib (on xarelto), CAD s/p PCI (2010), MS, HTN and CHF presenting with acute-onset, large-volume black stools with associated nausea, fatigue, dyspnea on exertion and presyncope but without associated abdominal pain. He has not had any episodes of hematemesis or hematochezia. Patient has had progressively worsening fatigue and generalized weakness over the last few weeks. He is on plavix and xarelto at home but is not on any NSAID medications. He last had an EGD in 2014 which revealed Grade A esophagitis, a Schatzki ring which was dilated, and mild gastritis (H. Pylori negative). His last colonoscopy in 2014 revealed multiple polyps with one large polyps incompletely removed with saline injection and hot snare (pathology with tubular adenomas). Repeat colonoscopy revealed a tattooed area at the splenic flexure but no residual polyp.      Chart reviewed and summarized here. History obtained with the assistance of patient's wife.     Past Medical History  CHF   CAD s/p PCI  MS  HTN  PVD  AAA    Past Surgical History  No prior intra-abdominal surgeries.     Social History  Former smoker, no alcohol or illicit drug use.     Family History  No family history of colorectal cancer.    Review of Systems  General ROS: Positive for fatigue; negative for chills, fever or weight loss  Psychological ROS: negative for hallucination, depression  Ophthalmic ROS: negative for blurry vision, photophobia  ENT ROS: negative for epistaxis, sore throat or rhinorrhea  Respiratory ROS: Positive for shortness of breath; no cough or wheezing  Cardiovascular ROS: Positive for dyspnea on exertion; no chest pain or palpitations  Gastrointestinal ROS: Positive for nausea and black stools; no abdominal pain, change in bowel habits, or bloody stools  Genito-Urinary ROS: no dysuria, trouble voiding  Musculoskeletal ROS: negative for gait disturbance or  muscular weakness  Neurological ROS: Positive for pre-syncope; no syncope or seizures; no ataxia  Dermatological ROS: negative for pruritis, rash and jaundice    Physical Examination  BP (!) 97/46   Pulse 70   Temp 98.3 °F (36.8 °C) (Oral)   Resp 20   SpO2 97%   General appearance: alert, cooperative, no distress  HENT: Normocephalic, atraumatic, neck symmetrical, no nasal discharge   Eyes: conjunctival pallor present, PERRL, EOM's intact  Lungs: clear to auscultation bilaterally, no dullness to percussion bilaterally  Heart: regular rate and rhythm without rub; no displacement of the PMI   Abdomen: soft, non-tender; bowel sounds normoactive; no organomegaly; Brown stool on rectal exam, no masses palpated  Extremities: extremities symmetric; no clubbing, cyanosis, or edema  Integument: Skin color, texture, turgor normal; no rashes; hair distrubution normal  Neurologic: Alert and oriented X 2, normal strength, normal coordination  Psychiatric: Pleasant affect, answers some questions appropriately     Labs:  Hemoglobin 4.2 (baseline 11)  Platelets 188  Lipase 78  BUN 51/creatinine 3.4    Imaging:  No recent imaging studies available for review.     Procedures:  EGD (5/20/22):   - LA Grade A reflux esophagitis.   - Mild Schatzki ring. Dilated.    - Gastritis. Biopsied.   - Normal examined duodenum.     Colonoscopy (5/20/22):    - One 8 mm polyp in the ascending colon.      - Two 8 mm polyps in the transverse colon.     - One 15 mm polyp at the splenic flexure.  Incomplete resection after saline lift and hot snare. Injected with ink.    - Two 6 mm polyps in the proximal descending colon,  in the distal descending colon, in the proximal  ascending colon and in the distal ascending colon.                          Assessment:    77 y.o. male with history of AAA, PVD, CKD, Afib (on xarelto), CAD s/p PCI (2010), MS, HTN and CHF presenting with acute-onset, large-volume black stools with associated severe, symptomatic  anemia requiring PRBC transfusion. His presentation is concerning for upper GI blood loss with differential including PUD vs. Dieulafoy lesion vs. Angioectasia vs. Malignancy.     Plan:   - Transfuse PRBCs for hemoglobin <8 (History of CAD)  - Check iron studies/ferritin  - Hold Xarelto and plavix for now   - IV PPI BID  - NPO at midnight for EGD    Case discussed with Dr. Solomon.     Michoacano Bruno MD  [facility] Fellow, PGY-4

## 2022-04-11 NOTE — PLAN OF CARE
"  Problem: Physical Therapy  Goal: Physical Therapy Goal  Description: Goals to be met by: 22     Patient will increase functional independence with mobility by performin. Supine to sit with Modified [place]  2. Sit to stand transfer with Modified [place]  3. Bed to chair transfer with Modified [place] using Rolling Walker  4. Gait  x 50 feet with Modified [place] and Supervision using Rolling Walker.   5. Lower extremity exercise program x10 reps per handout, with independence    Outcome: Ongoing, Progressing     PT Eval completed, note to follow. Pt reporting generalized joint pain and stating "hurt" with movements. Pt also stating "numb" when in standing and ambulating. Pt reporting 50% of full sensation to B LE. Pt only able to ambulate ~1 step forward and ~4-5 side steps R toward HOB with RW and Alexys. Pt's functional mobility is significantly limited by his numbness, which pt reports is worse than his baseline. Recommended pt follow-up with his neurologist regarding this issue and hx of MS. Recommending HH PT/OT and family assist.   "

## 2022-04-11 NOTE — PLAN OF CARE
Report given to leti  Patient alert and oriented  No complaints of pain or nausea   md at bedside   md spoke to family    vss

## 2022-04-11 NOTE — PLAN OF CARE
Problem: Adult Inpatient Plan of Care  Goal: Plan of Care Review  Outcome: Ongoing, Progressing  Goal: Patient-Specific Goal (Individualized)  Outcome: Ongoing, Progressing  Goal: Optimal Comfort and Wellbeing  Outcome: Ongoing, Progressing  Goal: Readiness for Transition of Care  Outcome: Ongoing, Progressing     Problem: Renal Function Impairment (Acute Kidney Injury/Impairment)  Goal: Effective Renal Function  Outcome: Ongoing, Progressing     Problem: Fall Injury Risk  Goal: Absence of Fall and Fall-Related Injury  Outcome: Ongoing, Progressing     Problem: Skin Injury Risk Increased  Goal: Skin Health and Integrity  Outcome: Ongoing, Progressing

## 2022-04-11 NOTE — TELEPHONE ENCOUNTER
----- Message from Fabian Suh sent at 4/11/2022  9:13 AM CDT -----  Type:  Needs Medical Advice    Who Called: Jessica/wife  Would the patient rather a call back or a response via [facility]? call  Best Call Back Number: [phone]   Additional Information: He was admitted to the hospital; he has an appointment this afternoon.

## 2022-04-11 NOTE — PLAN OF CARE
Report received from GILBERTO Martin. VSS, AAOx4, NPO since MN. Patient is vaccinated. 18 G in right AC and 20 G in left upper arm.

## 2022-04-11 NOTE — CONSULTS
+NEPHROLOGY CONSULT NOTE    HPI & INTERVAL HISTORY:    Past Medical History:   Diagnosis Date    CHF (congestive heart failure)     Hypertension     Personal history of colonic polyps 2005    PVD (peripheral vascular disease)       Past Surgical History:   Procedure Laterality Date    CORONARY ANGIOPLASTY WITH STENT PLACEMENT  08/18/2000    LCX stent/ PDA stent      Review of patient's allergies indicates:   Allergen Reactions    Cortisone      Other reaction(s): Itching      Medications Prior to Admission   Medication Sig Dispense Refill Last Dose    amiodarone (PACERONE) 200 MG Tab Take 0.5 tablets (100 mg total) by mouth once daily. 45 tablet 3 4/10/2022 at Unknown time    amLODIPine (NORVASC) 5 MG tablet TAKE 1 TABLET EVERY DAY (Patient taking differently: Take 5 mg by mouth once daily.) 90 tablet 3 4/10/2022 at Unknown time    chlorthalidone (HYGROTEN) 25 MG Tab TAKE 1 TABLET ONE TIME DAILY (Patient taking differently: Take 25 mg by mouth once daily. TAKE 1 TABLET ONE TIME DAILY) 90 tablet 3 4/10/2022 at Unknown time    cilostazol (PLETAL) 100 MG Tab TAKE 1 TABLET (100 MG TOTAL) BY MOUTH 2 (TWO) TIMES DAILY BEFORE MEALS. 60 tablet 11 4/10/2022 at Unknown time    clopidogreL (PLAVIX) 75 mg tablet Take 1 tablet (75 mg total) by mouth once daily. 90 tablet 3 4/10/2022 at Unknown time    irbesartan (AVAPRO) 150 MG tablet Take 1 tablet (150 mg total) by mouth every evening. 90 tablet 3 4/10/2022 at Unknown time    metoprolol succinate (TOPROL-XL) 25 MG 24 hr tablet Take 1 tablet (25 mg total) by mouth once daily. 90 tablet 3 4/10/2022 at Unknown time    omeprazole (PRILOSEC) 40 MG capsule TAKE 1 CAPSULE EVERY MORNING (Patient taking differently: Take 40 mg by mouth every morning.) 90 capsule 3 4/10/2022 at Unknown time    rivaroxaban (XARELTO) 20 mg Tab Take 1 tablet (20 mg total) by mouth once daily. 30 tablet 11 4/10/2022 at Unknown time    rosuvastatin (CRESTOR) 40 MG Tab TAKE 1 TABLET (40 MG  TOTAL) BY MOUTH ONCE DAILY. 90 tablet 3 4/10/2022 at Unknown time    nitroGLYCERIN (NITROSTAT) 0.4 MG SL tablet Place 1 tablet (0.4 mg total) under the tongue every 5 (five) minutes as needed. 25 tablet 5 More than a month at Unknown time       Social History     Socioeconomic History    Marital status:    Tobacco Use    Smoking status: Former Smoker     Packs/day: 0.25     Types: Cigarettes    Smokeless tobacco: Never Used   Substance and Sexual Activity    Alcohol use: No     Alcohol/week: 0.0 standard drinks        MEDS   amiodarone  100 mg Oral Daily    atorvastatin  80 mg Oral Daily    levothyroxine  25 mcg Oral Before breakfast    mupirocin   Nasal BID    pantoprazole  40 mg Intravenous BID             CONTINOUS INFUSIONS:      Intake/Output Summary (Last 24 hours) at 4/11/2022 1230  Last data filed at 4/11/2022 1015  Gross per 24 hour   Intake 720 ml   Output 1275 ml   Net -555 ml        HEMODYNAMICS:    Temp:  [97.5 °F (36.4 °C)-99.3 °F (37.4 °C)] 98.6 °F (37 °C)  Pulse:  [58-73] 62  Resp:  [17-42] 25  SpO2:  [89 %-100 %] 100 %  BP: ()/(32-70) 106/53   General :    Weakness  Fatigue   SOB with exertion  Deconditioning  Nausea  Black stool  No CP  No cough   No dysuria  Cardiology : pulse 59  Pulmonary : diminished breath sounds   Extremities : No edema  Skin: dry     LABS   Lab Results   Component Value Date    WBC 6.88 04/11/2022    HGB 8.4 (L) 04/11/2022    HCT 26.6 (L) 04/11/2022    MCV 79 (L) 04/11/2022     (L) 04/11/2022        Recent Labs   Lab 04/11/22  0356   GLU 79   CALCIUM 8.1*   ALBUMIN 3.0*   PROT 5.7*      K 4.4   CO2 20*   *   BUN 51*   CREATININE 3.0*   ALKPHOS 75   ALT 15   AST 19   BILITOT 1.0      Lab Results   Component Value Date    PTH 49.0 01/31/2018    CALCIUM 8.1 (L) 04/11/2022    PHOS 3.6 04/11/2022      Lab Results   Component Value Date    IRON 10 (L) 04/10/2022    TIBC 334 04/10/2022    FERRITIN 10 (L) 04/10/2022        ABG  No results  for input(s): PH, PO2, PCO2, HCO3, BE in the last 168 hours.      IMAGING:  CXR    ASSESSMENT / PLAN  RANDY/ CKD 4   + 375 cc  Creatinine 3  GFR 19 cc/min  Azotemia  BUN 51  Metabolic acidosis  Metabolic bone disease  Corrected Ca approximately 8.9  Poor nutrition  Albumin 3    Anemia  Iron 10  GI blood loss  GI consulted  Xarelto and plavix on hold    Hypothyroid    Echo 2022  · The left ventricle is mildly enlarged with eccentric hypertrophy and mildly decreased systolic function.  · The estimated ejection fraction is 45%.  · Normal right ventricular size with normal right ventricular systolic function.  · Severe left atrial enlargement.  · Grade I left ventricular diastolic dysfunction.  · Normal central venous pressure (3 mmHg).  · The estimated PA systolic pressure is 33 mmHg.  · CXR-   No convincing evidence of acute cardiopulmonary disease.    Hypotensive /53    Weight daily  I and O  Avoid hypotension, hypovolemia, nephrotoxic agents  Will follow up.

## 2022-04-11 NOTE — NURSING
CBC repeated after 3rd PRBC infused. H/H 8.1/26.    MD Dominique made aware. Okay to hold 4th unit at this time

## 2022-04-11 NOTE — EICU
EICU BRIEF INITIAL EVALUATION:       HISTORY:      77-year-old male presented with large amount of melena with nausea, fatigue, dyspnea on exertion and lightheadedness but no abdominal pain.  History of AAA, PVD, CKD, AFib on Xarelto, CAD status post PCI, MS, hypertension, CHF, esophagitis with Schatzki ring.  Also history of multiple colon polyps with last colonoscopy 2014    DVT prophylaxis SCDs  GI prophylaxis pantoprazole    Camera  /53, P 64, RR 21, O2 sat 97      CAMERA ASSESSMENT: Yes      TELEMETRY: Reviewed      NOTES: Reviewed     LABS: Reviewed      IMAGING: Personally reviewed.      DISCUSSED with bedside nurse     ASSESSMENT AND PLAN:     Upper GI hemorrhage-transfuse to hemoglobin 8, continue Protonix.  For EGD in the morning    Atrial fibrillation-rate currently 64.  Hold Xarelto and metoprolol for now    Hypertension-home antihypertensives being held    CAD-hold anti-platelet agents.  Restart statin when taking p.o.    RANDY on CKD - avoid nephrotoxins, renally dose medications.  Monitor renal function and urine output.  Volume resuscitation    CHF-monitor volume status.  Restart beta-blocker when stable    MS-does not appear to be on maintenance therapy.  No issues required treatment at present   \  RANDALL Judd MD  eICU Attending  [phone]

## 2022-04-11 NOTE — ANESTHESIA POSTPROCEDURE EVALUATION
Anesthesia Post Evaluation    Patient: Rex Song    Procedure(s) Performed: Procedure(s) (LRB):  EGD (ESOPHAGOGASTRODUODENOSCOPY) (N/A)    Final Anesthesia Type: MAC      Patient location during evaluation: GI PACU  Patient participation: Yes- Able to Participate  Level of consciousness: awake and alert  Post-procedure vital signs: reviewed and stable  Pain management: adequate  Airway patency: patent    PONV status at discharge: No PONV  Anesthetic complications: no      Cardiovascular status: hemodynamically stable  Respiratory status: unassisted, spontaneous ventilation and room air  Hydration status: euvolemic  Follow-up not needed.          Vitals Value Taken Time   /52 04/11/22 1624   Temp  04/11/22 1624   Pulse 68 04/11/22 1624   Resp 20 04/11/22 1624   SpO2 96% 04/11/22 1624         No case tracking events are documented in the log.      Pain/Al Score: No data recorded

## 2022-04-11 NOTE — PLAN OF CARE
OT ailin performed, report to follow    Pt will benefit from HH OT/PT    Problem: Occupational Therapy  Goal: Occupational Therapy Goal  Description: Goals to be met by: 5/11     Patient will increase functional independence with ADLs by performing:    UE Dressing with Modified [place].  LE Dressing with Modified [place].  Grooming while standing at sink with Modified [place].  Toileting from toilet with Modified [place] for hygiene and clothing management.   Toilet transfer to toilet with Modified [place].  Upper extremity exercise program x10 reps per handout, with independence.    Outcome: Ongoing, Progressing

## 2022-04-11 NOTE — H&P
[facility] Medicine H&P Note     Admitting Team: [facility] Hospitalist Team A  Attending Physician: Dr. Briceno  Resident: Lex  Intern: Atif    Date of Admit: 4/10/2022    Chief Complaint     Fatigue x2wk    Subjective:      History of Present Illness:  Rex Song is a 77 y.o. male with HFrEF (45% 4/2022), HTN, HLD/PVD, CAD s/p PCI LCX and PDA (2000), unspecified afib, AAA (2.4 cm 2022, stable from 2019), MS presenting on 4/10/2022 with primary complaint of fatigue x2wk.     Patient was in his usual state of health (independent ADL, walks with cane) until 2 weeks ago, when he started to become weak that progressed to be accompanied with shortness of breath. This morning, he was coming in from the garage and his wife saw him holding onto the door as if he was weak. He was nauseous and tried to vomit, but had no emesis. He went to the bathroom and had watery black stool, which prompted family to bring him to the hospital. Patient denies constipation. Denies history of GIB.     Past Medical History:  Past Medical History:   Diagnosis Date    CHF (congestive heart failure)     Hypertension     Personal history of colonic polyps 2005    PVD (peripheral vascular disease)        Past Surgical History:  Past Surgical History:   Procedure Laterality Date    CORONARY ANGIOPLASTY WITH STENT PLACEMENT  08/18/2000    LCX stent/ PDA stent       Allergies:  Review of patient's allergies indicates:   Allergen Reactions    Cortisone      Other reaction(s): Itching       Home Medications:  Prior to Admission medications    Medication Sig Start Date End Date Taking? Authorizing Provider   amiodarone (PACERONE) 200 MG Tab Take 0.5 tablets (100 mg total) by mouth once daily. 2/17/22 2/17/23 Yes Reshma Jones MD   amLODIPine (NORVASC) 5 MG tablet TAKE 1 TABLET EVERY DAY  Patient taking differently: Take 5 mg by mouth once daily. 3/31/22  Yes Reshma Jones MD   chlorthalidone (HYGROTEN) 25 MG Tab TAKE 1 TABLET ONE TIME  DAILY  Patient taking differently: Take 25 mg by mouth once daily. TAKE 1 TABLET ONE TIME DAILY 3/31/22  Yes Reshma Jones MD   cilostazol (PLETAL) 100 MG Tab TAKE 1 TABLET (100 MG TOTAL) BY MOUTH 2 (TWO) TIMES DAILY BEFORE MEALS. 2/18/18 4/10/22 Yes Mayank Borrero MD   clopidogreL (PLAVIX) 75 mg tablet Take 1 tablet (75 mg total) by mouth once daily. 21  Yes Reshma Jones MD   irbesartan (AVAPRO) 150 MG tablet Take 1 tablet (150 mg total) by mouth every evening. 22  Yes Reshma Jones MD   metoprolol succinate (TOPROL-XL) 25 MG 24 hr tablet Take 1 tablet (25 mg total) by mouth once daily. 22  Yes Reshma Jones MD   omeprazole (PRILOSEC) 40 MG capsule TAKE 1 CAPSULE EVERY MORNING  Patient taking differently: Take 40 mg by mouth every morning. 21  Yes Dmitriy Mckeon MD   rivaroxaban (XARELTO) 20 mg Tab Take 1 tablet (20 mg total) by mouth once daily. 21  Yes Reshma Jones MD   rosuvastatin (CRESTOR) 40 MG Tab TAKE 1 TABLET (40 MG TOTAL) BY MOUTH ONCE DAILY. 20  Yes Carlos Alberto Kilpatrick MD   nitroGLYCERIN (NITROSTAT) 0.4 MG SL tablet Place 1 tablet (0.4 mg total) under the tongue every 5 (five) minutes as needed. 3/24/14   Mayank Borrero MD       Family History:  Family History   Problem Relation Age of Onset    Prostate cancer Brother 59       Social History:  Social History     Tobacco Use    Smoking status: Former Smoker     Packs/day: 0.25     Types: Cigarettes    Smokeless tobacco: Never Used   Substance Use Topics    Alcohol use: No     Alcohol/week: 0.0 standard drinks       Review of Systems:  As per HPI. Also endorsing MSK pain in neck, lower extremities and arm.     Health Maintaince :   Primary Care Physician:   PCP . Awaiting appointment with Dr. Irby  Cardiologist: Dr. Jones    Immunizations:   TDap, PNA: unknown  COVD x2 Moderna    Cancer Screening:  Colonoscopy:  (4 polyps removed), recommended surveillance in 1 year. DUE     Objective:   Last   Hour Vital Signs:  BP  Min: 97/46  Max: 136/62  Temp  Av.1 °F (36.7 °C)  Min: 97.8 °F (36.6 °C)  Max: 98.3 °F (36.8 °C)  Pulse  Av.5  Min: 62  Max: 70  Resp  Av.7  Min: 18  Max: 28  SpO2  Av %  Min: 97 %  Max: 99 %  There is no height or weight on file to calculate BMI.  No intake/output data recorded.    Physical Examination:  General: no acute distress, alert  HEENT: normocephalic, atraumatic, EOMI, pale conjunctiva, moist mucous membrane  Neck: midline trachea, full ROM  Card: normal rate and rhythm, no murmur, rubs, gallops appreciated  Pulm: no respiratory distress, clear to auscultation b/l  Ab: +BS, soft, nondistended, nontender to palpation  Ext: no peripheral edema  Skin: extremities cool to touch, dry  Neuro: alert and orientedx3    Laboratory:  Most Recent Data:  CBC:   Lab Results   Component Value Date    WBC 9.05 04/10/2022    HGB 4.2 (LL) 04/10/2022    HCT 15.5 (LL) 04/10/2022     04/10/2022    MCV 78 (L) 04/10/2022    RDW 17.8 (H) 04/10/2022     WBC Differential: 86% N, 0% Bands, 0.5% L, 0.5% M, 0% Eo, 0.04% Baso, occasional additional cells seen    BMP:   Lab Results   Component Value Date     04/10/2022    K 4.6 04/10/2022     04/10/2022    CO2 20 (L) 04/10/2022    BUN 51 (H) 04/10/2022    CREATININE 3.4 (H) 04/10/2022     (H) 04/10/2022    CALCIUM 8.3 (L) 04/10/2022    MG 2.3 09/15/2010    PHOS 2.5 (L) 2018     LFTs:   Lab Results   Component Value Date    PROT 6.0 04/10/2022    ALBUMIN 3.1 (L) 04/10/2022    BILITOT 0.4 04/10/2022    AST 14 04/10/2022    ALKPHOS 79 04/10/2022    ALT 12 04/10/2022     Coags:   Lab Results   Component Value Date    INR 1.0 2005     FLP:   Lab Results   Component Value Date    CHOL 120 2019    HDL 33 (L) 2019    LDLCALC 51.6 (L) 2019    TRIG 177 (H) 2019    CHOLHDL 27.5 2019     DM:   Lab Results   Component Value Date    LDLCALC 51.6 (L) 2019    CREATININE 3.4 (H)  04/10/2022     Thyroid:   Lab Results   Component Value Date    TSH 2.400 06/03/2015     Anemia:   Lab Results   Component Value Date    IRON 84 01/31/2018    TIBC 314 01/31/2018    FERRITIN 74 01/31/2018     Cardiac: No results found for: TROPONINI, CKTOTAL, CKMB, BNP  Urinalysis:   Lab Results   Component Value Date    COLORU Yellow 02/07/2018    SPECGRAV 1.020 02/07/2018    NITRITE Negative 02/07/2018    KETONESU Negative 02/07/2018    UROBILINOGEN 2.0 02/07/2018    WBCUA 1 02/07/2018       Trended Lab Data:  Recent Labs   Lab 04/10/22  1706   WBC 9.05   HGB 4.2*   HCT 15.5*      MCV 78*   RDW 17.8*      K 4.6      CO2 20*   BUN 51*   CREATININE 3.4*   *   PROT 6.0   ALBUMIN 3.1*   BILITOT 0.4   AST 14   ALKPHOS 79   ALT 12       Trended Cardiac Data:  No results for input(s): TROPONINI, CKTOTAL, CKMB, BNP in the last 168 hours.    Microbiology Data:  None    Other Results:  EKG (my interpretation): Normal sinus, incomplete LBBB, prolonged QTc 505, ?ST depression in lateral leads that is not present in prior but limited due to artifact.     Radiology:  Imaging Results    None          Assessment:     Rex Song is a 77 y.o. male with HFrEF (45% 4/2022), HTN, HLD/PVD, CAD s/p PCI LCX and PDA (2000), unspecified afib, AAA (2.4 cm 2022, stable from 2019), CKD4, MS presenting on 4/10/2022 with primary complaint of fatigue x2wk.      Plan:     GI bleed  - In setting of plavix, xarelto. Hold while inpatient. No prior history of GIB.   - Iron panel pre-transfusion pending  - 2U pRBC transfusing. Obtain post transfusion H/H  - If appropriate response, ok for GI to scope tomorrow morning  - If hgb downtrending, will reach out to GI for urgent intervention  - If requiring >3U pRBC, will also give FFP  - Also consider IVF resuscitation  - 2 large IV bores  - IV PPI bid  - NPO, given uncertainty whether patient actively bleeding  - GI following    Hypotension  - Likely hypovolemic shock from  GIB  - Hold antihypertensives in setting of hypotension  - Resuscitation as above    RANDY on CKD4  - Baseline BUN/Cr: 15-19/1.7-1.9  - BUN/Cr on admit: 51/3.4  - Likely in setting of hypovolemia  - Expect correction with resuscitation as above   - If no improvement, obtain urine studies    Abnormal EKG  - EKG: Normal sinus, incomplete LBBB, prolonged QTc 505, ?ST depression in lateral leads that is not present in prior but limited due to artifact.   - Denies chest pain  - May be demand in setting of severe anemia  - Will obtain and trend trop. Patient may have intermediate elevation in setting of CKD  - Repeat EKG after resuscitation    HFrEF (TTE 45% 4/2022)  - Home chlorthalidone, irbesartan, toprol XR held in setting of hypotension. Resume once normotensive  - Baseline CXR ordered  - Monitor respiratory status while getting transfusion  - Repeat TTE if remains hypotensive despite stable H/H    CAD s/p PCI LCX and PDA (2000),  - Wife reports ASA discontinued and has only been on plavix   - Unclear if patient had PFA-100 assay done  - Will defer to cardiologist regarding Plavix vs ASA upon discharge    Unspecified afib  - Home med: amio and toprol   - Will continue amio and hold toprol while hypotensive  - EKG with normal sinus    HLD  - Continue home high intensity statin  - Follow up lipid panel (last one from 2019)    Tobacco use  - Reports having quit 7 days ago  - Encouraged cessation counseling  - Nicotine patch    MS  - No acute issues     HCM  - Ordered a1c, tsh    Social: Updated family members at bedside. Family waiting in the ED, will continue to update.     Code: Full  Dispo: admit to ICU for obs pending HDS      Yumiko Burnett MD  [facility], HO3    [facility] Medicine Hospitalist Pager numbers:   [facility] Hospitalist Medicine Team A (Janak/Kimberly): [phone]  [facility] Hospitalist Medicine Team B (Esteban/Dipika):  [phone]

## 2022-04-11 NOTE — PT/OT/SLP EVAL
"Occupational Therapy   Evaluation    Name: Rex Song  MRN: [id number]  Admitting Diagnosis:  Gastrointestinal hemorrhage  Recent Surgery: Procedure(s) (LRB):  EGD (ESOPHAGOGASTRODUODENOSCOPY) (N/A) Day of Surgery    Recommendations:     Discharge Recommendations: home health OT, home health PT  Discharge Equipment Recommendations:  shower chair  Barriers to discharge:  None    Assessment:     Rex Song is a 77 y.o. male with a medical diagnosis of Gastrointestinal hemorrhage.  He presents with performance deficits affecting function: weakness, impaired endurance, impaired self care skills, impaired sensation, impaired balance, gait instability, impaired functional mobilty, impaired cognition, decreased upper extremity function, decreased lower extremity function, pain.      Rehab Prognosis: Good; patient would benefit from acute skilled OT services to address these deficits and reach maximum level of function.       Plan:     Patient to be seen 3 x/week to address the above listed problems via self-care/home management, therapeutic activities, therapeutic exercises  · Plan of Care Expires: 05/11/22  · Plan of Care Reviewed with: patient, spouse    Subjective     Chief Complaint: generalized joint pain  Patient/Family Comments/goals: return home at [facility]    Occupational Profile:  Living Environment: Lives w/spouse in [facility] [place], [place]  Previous level of function: Mod I w/st cane  Roles and Routines: doesn't drive  Equipment Used at Home:  cane, straight, walker, rolling, wheelchair  Assistance upon Discharge: wife    Pain/Comfort:  · Pain Rating 1: other (see comments) (Pt with report of "toothache" like pain)  · Location - Orientation 1: generalized  · Location 1:  (all joints)  · Pain Addressed 1: Reposition, Distraction, Cessation of Activity  · Pain Rating Post-Intervention 1: other (see comments)    Patients cultural, spiritual, Sikh conflicts given the current situation: no    Objective: " "    Communicated with: nurse prior to session.  Patient found HOB elevated with blood pressure cuff, telemetry, pulse ox (continuous), oxygen, peripheral IV upon OT entry to room.    General Precautions: Standard, fall   Orthopedic Precautions:    Braces:    Respiratory Status: Nasal cannula, flow 3 L/min    Occupational Performance:    Bed Mobility:    · Patient completed Rolling/Turning to Right with contact guard assistance  · Patient completed Scooting/Bridging with contact guard assistance  · Patient completed Supine to Sit with contact guard assistance  · Patient completed Sit to Supine with contact guard assistance    Functional Mobility/Transfers:  · Patient completed Sit <> Stand Transfer with contact guard assistance  with  rolling walker   · Functional Mobility: Min A w/RW for walker mgmt for side steps     Activities of Daily Living:  · Lower Body Dressing: contact guard assistance for balance at EOB for sock mgmt    Cognitive/Visual Perceptual:  AO x 3 new month not year    Physical Exam:  BUE AROM grossly WFL, however decreased IR Rt shoulder  BUE strength grossly 3+/5; pt reports joint pain with all MMT  Pt report sensation ~ "50%" throughout BUE/LE  Fair+ sitting and Fair/Fair+ standing    AMPAC 6 Click ADL:  AMPAC Total Score: 18    Treatment & Education:  Pt/wife educated on role of OT/POC, follow up with neurology as pt reports he has not seen a neurologist "in a while" for his MS  Education:    Patient left HOB elevated with all lines intact, call button in reach, nurse notified and wife present    GOALS:   Multidisciplinary Problems     Occupational Therapy Goals        Problem: Occupational Therapy    Goal Priority Disciplines Outcome Interventions   Occupational Therapy Goal     OT, PT/OT Ongoing, Progressing    Description: Goals to be met by: 5/11     Patient will increase functional independence with ADLs by performing:    UE Dressing with Modified [place].  LE Dressing with Modified " [place].  Grooming while standing at sink with Modified [place].  Toileting from toilet with Modified [place] for hygiene and clothing management.   Toilet transfer to toilet with Modified [place].  Upper extremity exercise program x10 reps per handout, with independence.                     History:     Past Medical History:   Diagnosis Date    CHF (congestive heart failure)     Hypertension     Personal history of colonic polyps 2005    PVD (peripheral vascular disease)        Past Surgical History:   Procedure Laterality Date    CORONARY ANGIOPLASTY WITH STENT PLACEMENT  08/18/2000    LCX stent/ PDA stent       Time Tracking:     OT Date of Treatment: 04/11/22  OT Start Time: 1002  OT Stop Time: 1027  OT Total Time (min): 25 min w/PT    Billable Minutes:Evaluation 15  Therapeutic Activity 10    4/11/2022

## 2022-04-11 NOTE — PLAN OF CARE
VN note: transferred from icu.  progress notes, labs and vital signs reviewed. Will be available to intervene if needed.

## 2022-04-11 NOTE — PT/OT/SLP EVAL
"Physical Therapy Evaluation    Patient Name:  Rex Song   MRN:  [id number]    Recommendations:     Discharge Recommendations:  home health PT, home health OT   Discharge Equipment Recommendations: shower chair   Barriers to discharge: impaired functional mobility    Assessment:     Rex Song is a 77 y.o. male admitted with a medical diagnosis of Gastrointestinal hemorrhage.  He presents with the following impairments/functional limitations:  weakness, impaired endurance, impaired self care skills, impaired sensation, impaired balance, gait instability, impaired functional mobilty, impaired cognition, decreased lower extremity function, decreased upper extremity function, decreased ROM, pain, decreased coordination, impaired joint extensibility, impaired muscle length .Pt reporting generalized joint pain and stating "hurt" with movements. Pt also stating "numb" when in standing and ambulating. Pt reporting 50% of full sensation to B LE. Pt only able to ambulate ~1 step forward and ~4-5 side steps R toward HOB with RW and Alexys. Pt's functional mobility is significantly limited by his numbness, which pt reports is worse than his baseline. Recommended pt follow-up with his neurologist regarding this issue and hx of MS. Recommending  PT/OT and family assist.     Rehab Prognosis: Good; patient would benefit from acute skilled PT services to address these deficits and reach maximum level of function.    Recent Surgery: Procedure(s) (LRB):  EGD (ESOPHAGOGASTRODUODENOSCOPY) (N/A) Day of Surgery    Plan:     During this hospitalization, patient to be seen 3 x/week to address the identified rehab impairments via gait training, therapeutic activities, therapeutic exercises, neuromuscular re-education and progress toward the following goals:    · Plan of Care Expires:  05/11/22    Subjective     Chief Complaint: Generalized joint pain  Patient/Family Comments/goals: Return home to [facility]  Pain/Comfort:  · Pain Rating " "1:  ("toothache" like pain)  · Location - Orientation 1: generalized  · Location 1:  (all joints)  · Pain Addressed 1: Reposition, Distraction, Cessation of Activity    Patients cultural, spiritual, Lutheran conflicts given the current situation: no    Living Environment:  Pt lives with his spouse in a [facility], [place], and T/S.   Prior to admission, patients level of function was Mod I with SPC. Pt does not drive  Equipment used at home: wheelchair, walker, rolling, cane, straight.  DME owned (not currently used): rolling walker and wheelchair.  Upon discharge, patient will have assistance from spouse.    Objective:     Communicated with nsg prior to session.  Patient found HOB elevated with blood pressure cuff, telemetry, pulse ox (continuous), oxygen, peripheral IV (ICU monitoring)  upon PT entry to room.    General Precautions: Standard, fall   Orthopedic Precautions:N/A   Braces: N/A  Respiratory Status: Nasal cannula, flow 3 L/min    Exams:  · Cognitive Exam:  Patient is oriented to Person, Place and Situation, knows month but not year  · Postural Exam:  Patient presented with the following abnormalities:    · -       Rounded shoulders  · -       Forward head  · Sensation:    · -       Impaired  light/touch BLE ~50% of full sensation and sharp/dull B feet  · Skin Integrity/Edema:      · -       Skin integrity: Visible skin intact  · -       Edema: None noted BLE  · RLE ROM: WFL except lacking ~10 degrees of terminal knee extension, tight hamstrings  · RLE Strength: Grossly 4/5  · LLE ROM: WFL except lacking ~10 degrees of terminal knee extension, tight hamstrings  · LLE Strength: Grossly 4/5    Functional Mobility:  · Bed Mobility:     · Rolling Right: contact guard assistance  · Scooting: contact guard assistance  · Supine to Sit: contact guard assistance  · Sit to Supine: contact guard assistance  · Transfers:     · Sit to Stand:  contact guard assistance with rolling walker  · Gait: Pt only able to ambulate ~1 " step forward and ~4-5 side steps R toward HOB with RW and Alexys. Assist for RW management.     Therapeutic Activities and Exercises:   Pt educated on role of PT/POC, follow up with neurology for MS, and benefits of therapy while IP.  See above for details on mobility.     AM-PAC 6 CLICK MOBILITY  Total Score:16     Patient left HOB elevated with all lines intact, call button in reach, nsg notified and spouse present.    GOALS:   Multidisciplinary Problems     Physical Therapy Goals        Problem: Physical Therapy    Goal Priority Disciplines Outcome Goal Variances Interventions   Physical Therapy Goal     PT, PT/OT Ongoing, Progressing     Description: Goals to be met by: 22     Patient will increase functional independence with mobility by performin. Supine to sit with Modified Leo  2. Sit to stand transfer with Modified Leo  3. Bed to chair transfer with Modified Leo using Rolling Walker  4. Gait  x 50 feet with Modified Leo and Supervision using Rolling Walker.   5. Lower extremity exercise program x10 reps per handout, with independence                     History:     Past Medical History:   Diagnosis Date    CHF (congestive heart failure)     Hypertension     Personal history of colonic polyps     PVD (peripheral vascular disease)        Past Surgical History:   Procedure Laterality Date    CORONARY ANGIOPLASTY WITH STENT PLACEMENT  2000    LCX stent/ PDA stent       Time Tracking:     PT Received On: 22  PT Start Time: 1005     PT Stop Time: 1028  PT Total Time (min): 23 min     Billable Minutes: Evaluation 15 (cotx with OT)      2022

## 2022-04-11 NOTE — NURSING
No BM since transfer to ICU at 0000. Total of 3 units of PRBC's given in ED.     Patient is resting in bed. Awakens to verbal stimuli. Oriented to name and place. Reoriented to time. Moves all extremities and follows commands. See flowsheets for details. All lines are intact. See MAR for gtts. Bed in lowest position. Urinal in reach. 675 mL UOP. Call light in reach. Siderails x3. Will continue to monitor

## 2022-04-11 NOTE — TRANSFER OF CARE
Anesthesia Transfer of Care Note    Patient: Rex Song    Procedure(s) Performed: Procedure(s) (LRB):  EGD (ESOPHAGOGASTRODUODENOSCOPY) (N/A)    Patient location: GI    Anesthesia Type: MAC    Transport from OR: Transported from OR on room air with adequate spontaneous ventilation    Post pain: adequate analgesia    Post assessment: no apparent anesthetic complications and tolerated procedure well    Post vital signs: stable    Level of consciousness: responds to stimulation and sedated    Nausea/Vomiting: no nausea/vomiting    Complications: none    Transfer of care protocol was followed      Last vitals:   Visit Vitals  BP (!) 139/46   Pulse 78   Temp 37.1 °C (98.8 °F)   Resp 18   Ht 5' (1.524 m)   Wt 63.3 kg (139 lb 8.8 oz)   SpO2 100%   BMI 27.25 kg/m²

## 2022-04-11 NOTE — NURSING
Report called to GILBERTO White. On 5. Pt transferring to room 531. VSS, domonique, family at bs. EGD scheduled for today 1600. LR infusing at 75cc/hr, voiding w/o diff. See flow sheet for eve info.

## 2022-04-11 NOTE — PROVATION PATIENT INSTRUCTIONS
Discharge Summary/Instructions after an Endoscopic Procedure  Patient Name: Rex Song  Patient MRN: [id number]  Patient YOB: 1945 Monday, April 11, 2022  Benson Mandujano MD  Dear patient,  As a result of recent federal legislation (The Federal Cures Act), you may   receive lab or pathology results from your procedure in your [facility]   account before your physician is able to contact you. Your physician or   their representative will relay the results to you with their   recommendations at their soonest availability.  Thank you,  Your health is very important to us during the Covid Crisis. Following your   procedure today, you will receive a daily text for 2 weeks asking about   signs or symptoms of Covid 19.  Please respond to this text when you   receive it so we can follow up and keep you as safe as possible.   RESTRICTIONS:  During your procedure today, you received medications for sedation.  These   medications may affect your judgment, balance and coordination.  Therefore,   for 24 hours, you have the following restrictions:   - DO NOT drive a car, operate machinery, make legal/financial decisions,   sign important papers or drink alcohol.    ACTIVITY:  Today: no heavy lifting, straining or running due to procedural   sedation/anesthesia.  The following day: return to full activity including work.  DIET:  Eat and drink normally unless instructed otherwise.     TREATMENT FOR COMMON SIDE EFFECTS:  - Mild abdominal pain, nausea, belching, bloating or excessive gas:  rest,   eat lightly and use a heating pad.  - Sore Throat: treat with throat lozenges and/or gargle with warm salt   water.  - Because air was used during the procedure, expelling large amounts of air   from your rectum or belching is normal.  - If a bowel prep was taken, you may not have a bowel movement for 1-3 days.    This is normal.  SYMPTOMS TO WATCH FOR AND REPORT TO YOUR PHYSICIAN:  1. Abdominal pain or bloating,  other than gas cramps.  2. Chest pain.  3. Back pain.  4. Signs of infection such as: chills or fever occurring within 24 hours   after the procedure.  5. Rectal bleeding, which would show as bright red, maroon, or black stools.   (A tablespoon of blood from the rectum is not serious, especially if   hemorrhoids are present.)  6. Vomiting.  7. Weakness or dizziness.  GO DIRECTLY TO THE NEAREST EMERGENCY ROOM IF YOU HAVE ANY OF THE FOLLOWING:      Difficulty breathing              Chills and/or fever over 101 F   Persistent vomiting and/or vomiting blood   Severe abdominal pain   Severe chest pain   Black, tarry stools   Bleeding- more than one tablespoon   Any other symptom or condition that you feel may need urgent attention  Your doctor recommends these additional instructions:  If any biopsies were taken, your doctors clinic will contact you in 1 to 2   weeks with any results.  - Return patient to hospital [facility] for ongoing care.   - Clear liquid diet.   - Continue present medications.   - Perform a colonoscopy tomorrow.  For questions, problems or results please call your physician - Benson Mandujano MD.  EMERGENCY PHONE NUMBER: [phone],  LAB RESULTS: [phone]  IF A COMPLICATION OR EMERGENCY SITUATION ARISES AND YOU ARE UNABLE TO REACH   YOUR PHYSICIAN - GO DIRECTLY TO THE EMERGENCY ROOM.  Benson Mandujano MD  4/11/2022 4:20:44 PM  This report has been verified and signed electronically.  Dear patient,  As a result of recent federal legislation (The Federal Cures Act), you may   receive lab or pathology results from your procedure in your [facility]   account before your physician is able to contact you. Your physician or   their representative will relay the results to you with their   recommendations at their soonest availability.  Thank you,  PROVATION

## 2022-04-11 NOTE — PLAN OF CARE
The sw met with the pt to complete the assessment. The pt lives in [place] with his wife Jessica Song [phone]. The pt,his wife and adult children were in the room during the assessment. The pt's independent with his adl's and uses a w/c and r walker at home. The pt doesn't drive so his wife transports him to dr nur's and errands. She will transport the pt home at d/c. The pt has a very supportive family. The sw completed the white board in the pt's room and gave his wife a d/c brochure. The sw encouraged them to call if they have any further questions or concerns. The sw will continue to follow the pt throughout his transitions of care and will assist with any d/c needs.        04/11/22 1207   Discharge Assessment   Assessment Type Discharge Planning Assessment   Confirmed/corrected address, phone number and insurance Yes   Confirmed Demographics Correct on Facesheet   Source of Information patient;family   When was your last doctors appointment? 04/04/22   Communicated THALIA with patient/caregiver Yes   Reason For Admission Gastrointestinal hemorrhage   Lives With spouse   Do you expect to return to your current living situation? Yes   Do you have help at home or someone to help you manage your care at home? Yes   Who are your caregiver(s) and their phone number(s)? Jessica Song(wife)[phone]   Prior to hospitilization cognitive status: Alert/Oriented   Current cognitive status: Alert/Oriented   Walking or Climbing Stairs Difficulty ambulation difficulty, requires equipment;stair climbing difficulty, requires equipment;transferring difficulty, requires equipment   Dressing/Bathing Difficulty none   Home Accessibility wheelchair accessible   Home Layout Able to live on 1st floor   Equipment Currently Used at Home wheelchair;walker, rolling   Readmission within 30 days? No   Patient currently being followed by outpatient case management? No   Do you currently have service(s) that help you manage your care at home?  No   Do you take prescription medications? Yes   Do you have prescription coverage? Yes   Coverage [employer]   Do you have any problems affording any of your prescribed medications? No  (the pt receives his meds affordably via [employer] [facility] and [facility] in [place])   Is the patient taking medications as prescribed? yes   Who is going to help you get home at discharge? Jessica Song([phone]   How do you get to doctors appointments? family or friend will provide   Are you on dialysis? No   Do you take coumadin? No   Discharge Plan A Home with family   Discharge Plan B Home Health   DME Needed Upon Discharge  other (see comments)  (TBD)   Discharge Plan discussed with: Spouse/sig other;Adult children   Name(s) and Number(s) Jessica Song(wife)[phone]   Discharge Barriers Identified None   Relationship/Environment   Name(s) of Who Lives With Patient Jessica Song(wife)[phone]

## 2022-04-11 NOTE — PROGRESS NOTES
[facility] Medicine Progress Note    Primary Team: [facility] Hospitalist Team A  Attending Physician: Dr. Briceno  Resident: Dr. Burnett  Intern: Dr. Srivastava    Subjective:      S/p 3U pRBC with Hgb improved to 8. Patient denies BM.   No bowel movements overnight per patient.   Endorses feeling much better.   NPO since midnight for possible scope with GI.     Objective:     Last 24 Hour Vital Signs:  BP  Min: 69/32  Max: 136/62  Temp  Av.1 °F (36.7 °C)  Min: 97.5 °F (36.4 °C)  Max: 98.7 °F (37.1 °C)  Pulse  Av.9  Min: 62  Max: 71  Resp  Av.8  Min: 18  Max: 28  SpO2  Av.1 %  Min: 92 %  Max: 100 %  No intake/output data recorded.    Physical Examination:  General: no acute distress, alert  HEENT: normocephalic, atraumatic, EOMI, pale conjunctiva, moist mucous membrane  Neck: midline trachea, full ROM  Card: normal rate and rhythm, no murmur, rubs, gallops appreciated  Pulm: no respiratory distress, clear to auscultation b/l  Ab: +BS, soft, nondistended, nontender to palpation  Ext: no peripheral edema  Skin: extremities cool to touch, dry  Neuro: alert and orientedx3    Laboratory:  Laboratory Data Reviewed: yes  Pertinent Findings:  H/H: 4.2 --> 8.4  Plt: 188 --> 128    Iron: 10  TIBC: 334  %sat: 3%  Transferrin: 226  Ferritin 10    BUN/Cr: 51/3.4 --> 51/3    Trop: 0.037 --> 0.041    Lipid panel cholesterol: 98  HDL 29  LDL 43.6    A1c 5.2%  TSH: 23.8  T4: 0.47    Microbiology Data Reviewed: yes  Pertinent Findings:  None    Other Results:  EKG (my interpretation): Normal sinus, incomplete LBBB, prolonged QTc 505, ?ST depression in lateral leads that is not present in prior but limited due to artifact.    Radiology Data Reviewed: yes  Pertinent Findings:  CXR (4/10/22): Acute cardiopulmonary disease    Current Medications:     Infusions:       Scheduled:   [START ON 2022] amiodarone  100 mg Oral Daily    [START ON 2022] atorvastatin  80 mg Oral Daily    [START ON 2022]  chlorthalidone  25 mg Oral Daily    [START ON 4/11/2022] pantoprazole  40 mg Intravenous BID        PRN:  sodium chloride, acetaminophen, sodium chloride 0.9%    Assessment:     Rex Song is a 77 y.o. male with HFrEF (45% 4/2022), HTN, HLD/PVD, CAD s/p PCI LCX and PDA (2000), unspecified afib, AAA (2.4 cm 2022, stable from 2019), CKD4, MS admitted for symptomatic anemia in setting of GI bleed with anticoag/antiplatelet.    Obs in ICU overnight with appropriate response post transfusion. Awaiting GI evaluation for possible scope. Patient NPO.      Plan:     GI bleed  - In setting of plavix, xarelto. Hold while inpatient. No prior history of GIB.   - Iron panel consistent with acute blood loss.   - S/p 3U pRBC with appropriate hgb response. Repeat CBC pending to assess continued active bleed.   - Hgb goal >8 given h/o CAD  - If requiring >3U pRBC, will also consider giving FFP   - 2 large IV bore access  - Continue IV PPI bid pending scope  - NPO for scope with GI today  - GI following     Hypotension, improving  - Likely hypovolemic shock from GIB with inappropriate HR response due to beta blocker  - Hold antihypertensives in setting of hypotension, restart prn  - S/p resuscitation as above     Acute kidney injury on CKD4  - Baseline BUN/Cr: 15-19/1.7-1.9  - BUN/Cr on admit: 51/3.4  - Likely in setting of hypovolemia, improving with pRBC  - Expect correction with continued volume resuscitation  - If no improvement, obtain urine studies     Abnormal EKG  - EKG: Normal sinus, incomplete LBBB, prolonged QTc 505, ?ST depression in lateral leads that is not present in prior but limited due to artifact.   - Trop: 0.037, stable  - Likely in setting of RANDY on CKD with demand ischemia in setting of severe anemia     Hypothyroidism  - TSH 23  T4 0.47  - Start synthroid  - Repeat thyroid studies in 6 weeks    HFrEF (TTE 45% 4/2022)  - Home chlorthalidone, irbesartan, toprol XR held in setting of hypotension. Resume  once normotensive  - Baseline CXR without evidence of pulmonary edema  - No respiratory distress with transfusion  - Consider repeat TTE if remains hypotensive despite stable H/H     CAD s/p PCI LCX and PDA (2000),  - Wife reports ASA discontinued and has only been on plavix   - Unclear if patient had PFA-100 assay done  - Will defer to cardiologist regarding Plavix vs ASA upon discharge     Unspecified afib  - Home med: amio and toprol   - Will continue amio and hold toprol while hypotensive  - EKG with normal sinus     HLD  - Lipid panel cholesterol: 98  HDL 29  LDL 43.6    - Continue home high intensity statin    Tobacco use  - Reports having quit 7 days ago  - Encouraged cessation counseling  - Nicotine patch     MS  - No acute issues      HCM  - A1c: 5.2%     Social: Update family prn     Code: Full  Dispo: Step down if H/H stable        Yumiko Burnett MD  [facility] Med-Peds, HO3    [facility] Medicine Hospitalist Pager numbers:   [facility] Hospitalist Medicine Team A (Janak/Kimberly): [phone]  [facility] Hospitalist Medicine Team B (Esteban/Dipika):  [phone]

## 2022-04-11 NOTE — ED NOTES
Patient resting comfortably. Patient has complaints of neck and leg pain, denies any other complaints.     Level of Consciousness: The patient is awake, alert, and oriented with appropriate affect and speech; oriented to person, place and time.  Appearance: Lying in bed with no acute distress noted.   Skin: Skin is warm and dry with good skin turgor; intact; color consistent with ethnicity. Pallor noted.   Musculoskeletal: Moves all extremities well in full range of motion. No obvious deformities or swelling noted.  Respiratory: Airway open and patent, respirations spontaneous, even and unlabored. No accessory muscles in use. Breath sounds clear.  Cardiac: Regular rate and rhythm, no peripheral edema noted, good pulses palpated peripherally, capillary refill < 3 seconds.  Abdomen: Soft, non-tender to palpation.Patient denies any nausea or vomiting at this time.   Neurologic: PERRLA, face exhibits symmetrical expression, hand grasps equal and even bilaterally, reports normal sensation to all extremities and face.  Psychosocial: Calm and cooperative.     Patient verbalized understanding of status and plan of care. Side rails up x 2, call light in reach, bed low and locked. Family at bedside. Will hold off on PRBC administration until repeat CBC resulted. Will continue to monitor.

## 2022-04-11 NOTE — ED NOTES
Notified by lab that CBC can only be completed 1 hour post transfusion. Dr. Burnett notified, states to draw CBC one hour after completion of 3rd unit to evaluate hgb prior to giving 4th unit.

## 2022-04-11 NOTE — ANESTHESIA PREPROCEDURE EVALUATION
04/11/2022  Rex Song is a 77 y.o., male in ICU with GIB/anemia, RANDY for EGD under MAC    Past Medical History:   Diagnosis Date    CHF (congestive heart failure)     Hypertension     Personal history of colonic polyps 2005    PVD (peripheral vascular disease)      Past Surgical History:   Procedure Laterality Date    CORONARY ANGIOPLASTY WITH STENT PLACEMENT  08/18/2000    LCX stent/ PDA stent     Vitals:    04/11/22 1136   BP:    Pulse: (!) 58   Resp: (!) 26   Temp:            Pre-op Assessment    I have reviewed the Patient Summary Reports.    I have reviewed the NPO Status.   I have reviewed the Medications.     Review of Systems  Anesthesia Hx:   Denies Personal Hx of Anesthesia complications.   Social:  Former Smoker    Hematology/Oncology:         -- Anemia:       Physical Exam  General: Well nourished    Airway:  Mallampati: II   Mouth Opening: Normal      Lab Results   Component Value Date    WBC 6.88 04/11/2022    HGB 8.4 (L) 04/11/2022    HCT 26.6 (L) 04/11/2022     (L) 04/11/2022    CHOL 98 (L) 04/11/2022    TRIG 127 04/11/2022    HDL 29 (L) 04/11/2022    ALT 15 04/11/2022    AST 19 04/11/2022     04/11/2022    K 4.4 04/11/2022     (H) 04/11/2022    CREATININE 3.0 (H) 04/11/2022    BUN 51 (H) 04/11/2022    CO2 20 (L) 04/11/2022    TSH 23.839 (H) 04/11/2022    INR 1.0 02/20/2005    HGBA1C 5.2 04/11/2022 4/5/22  · The left ventricle is mildly enlarged with eccentric hypertrophy and mildly decreased systolic function.  · The estimated ejection fraction is 45%.  · Normal right ventricular size with normal right ventricular systolic function.  · Severe left atrial enlargement.  · Grade I left ventricular diastolic dysfunction.  · Normal central venous pressure (3 mmHg).  · The estimated PA systolic pressure is 33 mmHg.          Anesthesia Plan  Type of Anesthesia,  risks & benefits discussed:    Anesthesia Type: MAC, Gen ETT  Intra-op Monitoring Plan: Standard ASA Monitors  Informed Consent: Informed consent signed with the Patient and all parties understand the risks and agree with anesthesia plan.  All questions answered.   ASA Score: 3    Ready For Surgery From Anesthesia Perspective.       Anesthesia Plan  Type of Anesthesia, risks & benefits discussed:    Anesthesia Type: MAC, Gen ETT  Intra-op Monitoring Plan: Standard ASA Monitors  Informed Consent: Informed consent signed with the Patient and all parties understand the risks and agree with anesthesia plan.  All questions answered.   ASA Score: 3    Ready For Surgery From Anesthesia Perspective.     .

## 2022-04-12 ENCOUNTER — ANESTHESIA EVENT (OUTPATIENT)
Dept: ENDOSCOPY | Facility: HOSPITAL | Age: 77
DRG: 813 | End: 2022-04-12
Payer: MEDICARE

## 2022-04-12 ENCOUNTER — ANESTHESIA (OUTPATIENT)
Dept: ENDOSCOPY | Facility: HOSPITAL | Age: 77
DRG: 813 | End: 2022-04-12
Payer: MEDICARE

## 2022-04-12 PROBLEM — D64.9 SYMPTOMATIC ANEMIA: Status: RESOLVED | Noted: 2022-04-10 | Resolved: 2022-04-12

## 2022-04-12 LAB
ALBUMIN SERPL BCP-MCNC: 3 G/DL (ref 3.5–5.2)
ALP SERPL-CCNC: 81 U/L (ref 55–135)
ALT SERPL W/O P-5'-P-CCNC: 12 U/L (ref 10–44)
ANION GAP SERPL CALC-SCNC: 10 MMOL/L (ref 8–16)
AST SERPL-CCNC: 25 U/L (ref 10–40)
BASOPHILS # BLD AUTO: 0.05 K/UL (ref 0–0.2)
BASOPHILS NFR BLD: 0.7 % (ref 0–1.9)
BILIRUB SERPL-MCNC: 0.5 MG/DL (ref 0.1–1)
BUN SERPL-MCNC: 37 MG/DL (ref 8–23)
CALCIUM SERPL-MCNC: 8.4 MG/DL (ref 8.7–10.5)
CHLORIDE SERPL-SCNC: 111 MMOL/L (ref 95–110)
CO2 SERPL-SCNC: 20 MMOL/L (ref 23–29)
CREAT SERPL-MCNC: 2.4 MG/DL (ref 0.5–1.4)
DIFFERENTIAL METHOD: ABNORMAL
EOSINOPHIL # BLD AUTO: 0.1 K/UL (ref 0–0.5)
EOSINOPHIL NFR BLD: 1.4 % (ref 0–8)
ERYTHROCYTE [DISTWIDTH] IN BLOOD BY AUTOMATED COUNT: 17.2 % (ref 11.5–14.5)
EST. GFR  (AFRICAN AMERICAN): 29 ML/MIN/1.73 M^2
EST. GFR  (NON AFRICAN AMERICAN): 25 ML/MIN/1.73 M^2
GLUCOSE SERPL-MCNC: 77 MG/DL (ref 70–110)
HCT VFR BLD AUTO: 28.1 % (ref 40–54)
HGB BLD-MCNC: 8.9 G/DL (ref 14–18)
IMM GRANULOCYTES # BLD AUTO: 0.06 K/UL (ref 0–0.04)
IMM GRANULOCYTES NFR BLD AUTO: 0.8 % (ref 0–0.5)
LYMPHOCYTES # BLD AUTO: 0.5 K/UL (ref 1–4.8)
LYMPHOCYTES NFR BLD: 6.9 % (ref 18–48)
MCH RBC QN AUTO: 25.3 PG (ref 27–31)
MCHC RBC AUTO-ENTMCNC: 31.7 G/DL (ref 32–36)
MCV RBC AUTO: 80 FL (ref 82–98)
MONOCYTES # BLD AUTO: 0.7 K/UL (ref 0.3–1)
MONOCYTES NFR BLD: 10.1 % (ref 4–15)
NEUTROPHILS # BLD AUTO: 5.8 K/UL (ref 1.8–7.7)
NEUTROPHILS NFR BLD: 80.1 % (ref 38–73)
NRBC BLD-RTO: 0 /100 WBC
PLATELET # BLD AUTO: 128 K/UL (ref 150–450)
PMV BLD AUTO: 11.1 FL (ref 9.2–12.9)
POTASSIUM SERPL-SCNC: 4.2 MMOL/L (ref 3.5–5.1)
PROT SERPL-MCNC: 5.8 G/DL (ref 6–8.4)
RBC # BLD AUTO: 3.52 M/UL (ref 4.6–6.2)
SODIUM SERPL-SCNC: 141 MMOL/L (ref 136–145)
WBC # BLD AUTO: 7.21 K/UL (ref 3.9–12.7)

## 2022-04-12 PROCEDURE — 94761 N-INVAS EAR/PLS OXIMETRY MLT: CPT

## 2022-04-12 PROCEDURE — 11000001 HC ACUTE MED/SURG PRIVATE ROOM

## 2022-04-12 PROCEDURE — 99900035 HC TECH TIME PER 15 MIN (STAT)

## 2022-04-12 PROCEDURE — 85025 COMPLETE CBC W/AUTO DIFF WBC: CPT | Performed by: STUDENT IN AN ORGANIZED HEALTH CARE EDUCATION/TRAINING PROGRAM

## 2022-04-12 PROCEDURE — 63600175 PHARM REV CODE 636 W HCPCS: Performed by: INTERNAL MEDICINE

## 2022-04-12 PROCEDURE — 37000008 HC ANESTHESIA 1ST 15 MINUTES: Performed by: INTERNAL MEDICINE

## 2022-04-12 PROCEDURE — 45378 DIAGNOSTIC COLONOSCOPY: CPT | Performed by: INTERNAL MEDICINE

## 2022-04-12 PROCEDURE — 25000003 PHARM REV CODE 250: Performed by: NURSE ANESTHETIST, CERTIFIED REGISTERED

## 2022-04-12 PROCEDURE — 63600175 PHARM REV CODE 636 W HCPCS: Performed by: STUDENT IN AN ORGANIZED HEALTH CARE EDUCATION/TRAINING PROGRAM

## 2022-04-12 PROCEDURE — 25000003 PHARM REV CODE 250: Performed by: STUDENT IN AN ORGANIZED HEALTH CARE EDUCATION/TRAINING PROGRAM

## 2022-04-12 PROCEDURE — 45378 DIAGNOSTIC COLONOSCOPY: CPT | Mod: ,,, | Performed by: INTERNAL MEDICINE

## 2022-04-12 PROCEDURE — 80053 COMPREHEN METABOLIC PANEL: CPT | Performed by: STUDENT IN AN ORGANIZED HEALTH CARE EDUCATION/TRAINING PROGRAM

## 2022-04-12 PROCEDURE — 94760 N-INVAS EAR/PLS OXIMETRY 1: CPT

## 2022-04-12 PROCEDURE — C9113 INJ PANTOPRAZOLE SODIUM, VIA: HCPCS | Performed by: STUDENT IN AN ORGANIZED HEALTH CARE EDUCATION/TRAINING PROGRAM

## 2022-04-12 PROCEDURE — 45378 PR COLONOSCOPY,DIAGNOSTIC: ICD-10-PCS | Mod: ,,, | Performed by: INTERNAL MEDICINE

## 2022-04-12 PROCEDURE — 27000221 HC OXYGEN, UP TO 24 HOURS

## 2022-04-12 PROCEDURE — 36415 COLL VENOUS BLD VENIPUNCTURE: CPT | Performed by: STUDENT IN AN ORGANIZED HEALTH CARE EDUCATION/TRAINING PROGRAM

## 2022-04-12 PROCEDURE — 63600175 PHARM REV CODE 636 W HCPCS: Performed by: NURSE ANESTHETIST, CERTIFIED REGISTERED

## 2022-04-12 PROCEDURE — 37000009 HC ANESTHESIA EA ADD 15 MINS: Performed by: INTERNAL MEDICINE

## 2022-04-12 PROCEDURE — 25000003 PHARM REV CODE 250: Performed by: INTERNAL MEDICINE

## 2022-04-12 RX ORDER — PROPOFOL 10 MG/ML
VIAL (ML) INTRAVENOUS
Status: DISCONTINUED | OUTPATIENT
Start: 2022-04-12 | End: 2022-04-12

## 2022-04-12 RX ORDER — FERROUS SULFATE 325(65) MG
325 TABLET, DELAYED RELEASE (ENTERIC COATED) ORAL DAILY
Qty: 30 TABLET | Refills: 3 | Status: SHIPPED | OUTPATIENT
Start: 2022-04-12 | End: 2022-04-13 | Stop reason: SDUPTHER

## 2022-04-12 RX ORDER — LEVOTHYROXINE SODIUM 25 UG/1
25 TABLET ORAL
Qty: 30 TABLET | Refills: 11 | Status: SHIPPED | OUTPATIENT
Start: 2022-04-13 | End: 2022-04-13 | Stop reason: SDUPTHER

## 2022-04-12 RX ORDER — PROPOFOL 10 MG/ML
VIAL (ML) INTRAVENOUS CONTINUOUS PRN
Status: DISCONTINUED | OUTPATIENT
Start: 2022-04-12 | End: 2022-04-12

## 2022-04-12 RX ORDER — LANOLIN ALCOHOL/MO/W.PET/CERES
1 CREAM (GRAM) TOPICAL DAILY
Status: DISCONTINUED | OUTPATIENT
Start: 2022-04-12 | End: 2022-04-13 | Stop reason: HOSPADM

## 2022-04-12 RX ORDER — LIDOCAINE HYDROCHLORIDE 20 MG/ML
INJECTION INTRAVENOUS
Status: DISCONTINUED | OUTPATIENT
Start: 2022-04-12 | End: 2022-04-12

## 2022-04-12 RX ADMIN — LEVOTHYROXINE SODIUM 25 MCG: 25 TABLET ORAL at 05:04

## 2022-04-12 RX ADMIN — AMIODARONE HYDROCHLORIDE 100 MG: 100 TABLET ORAL at 09:04

## 2022-04-12 RX ADMIN — SODIUM CHLORIDE: 0.9 INJECTION, SOLUTION INTRAVENOUS at 12:04

## 2022-04-12 RX ADMIN — MUPIROCIN: 20 OINTMENT TOPICAL at 08:04

## 2022-04-12 RX ADMIN — MUPIROCIN: 20 OINTMENT TOPICAL at 09:04

## 2022-04-12 RX ADMIN — LIDOCAINE HYDROCHLORIDE 100 MG: 20 INJECTION, SOLUTION INTRAVENOUS at 12:04

## 2022-04-12 RX ADMIN — PROPOFOL 100 MCG/KG/MIN: 10 INJECTION, EMULSION INTRAVENOUS at 12:04

## 2022-04-12 RX ADMIN — PROPOFOL 60 MG: 10 INJECTION, EMULSION INTRAVENOUS at 12:04

## 2022-04-12 RX ADMIN — FERROUS SULFATE TAB 325 MG (65 MG ELEMENTAL FE) 1 EACH: 325 (65 FE) TAB at 04:04

## 2022-04-12 RX ADMIN — PANTOPRAZOLE SODIUM 40 MG: 40 INJECTION, POWDER, LYOPHILIZED, FOR SOLUTION INTRAVENOUS at 08:04

## 2022-04-12 RX ADMIN — PANTOPRAZOLE SODIUM 40 MG: 40 INJECTION, POWDER, LYOPHILIZED, FOR SOLUTION INTRAVENOUS at 09:04

## 2022-04-12 RX ADMIN — ATORVASTATIN CALCIUM 80 MG: 40 TABLET, FILM COATED ORAL at 09:04

## 2022-04-12 RX ADMIN — IRON SUCROSE 100 MG: 20 INJECTION, SOLUTION INTRAVENOUS at 05:04

## 2022-04-12 NOTE — PROGRESS NOTES
[facility] Medicine Progress Note    Primary Team: [facility] Hospitalist Team A  Attending Physician: Dr. Briceno  Resident: Dr. Burnett  Intern: Dr. Srivastava    Subjective:      Hgb stable. Creatinine improving. Patient reports he had a lose bowel movement so stopped taking the bowel prep. Patient agreeable to continue to taking bowel prep for the colonoscopy today. Reports feeling much better today. Denies pain.      Objective:     Last 24 Hour Vital Signs:  BP  Min: 94/54  Max: 149/67  Temp  Av.1 °F (36.7 °C)  Min: 97.5 °F (36.4 °C)  Max: 98.8 °F (37.1 °C)  Pulse  Av.9  Min: 58  Max: 128  Resp  Av.9  Min: 16  Max: 33  SpO2  Av %  Min: 89 %  Max: 100 %  Weight  Av.5 kg (133 lb 6.1 oz)  Min: 60.5 kg (133 lb 6.1 oz)  Max: 60.5 kg (133 lb 6.1 oz)  I/O last 3 completed shifts:  In: 804.9 [I.V.:104.9; Blood:700]  Out:  [Urine:]    Physical Examination:  General: no acute distress, alert  HEENT: normocephalic, atraumatic, EOMI, pale conjunctiva, moist mucous membrane  Neck: midline trachea, full ROM  Card: normal rate and rhythm, no murmur, rubs, gallops appreciated  Pulm: no respiratory distress, clear to auscultation b/l  Ab: +BS, soft, nondistended, nontender to palpation  Ext: no peripheral edema  Skin: extremities cool to touch, dry  Neuro: alert and orientedx3    Laboratory:  Laboratory Data Reviewed: yes  Pertinent Findings:  H/H: stable  Plt: stable    Iron: 10  TIBC: 334  %sat: 3%  Transferrin: 226  Ferritin 10    BUN/Cr: improving    Trop: 0.037 --> 0.041    Lipid panel cholesterol: 98  HDL 29  LDL 43.6    A1c 5.2%  TSH: 23.8  T4: 0.47    Microbiology Data Reviewed: yes  Pertinent Findings:  None    Other Results:  EKG (my interpretation): Normal sinus, incomplete LBBB, prolonged QTc 505, ?ST depression in lateral leads that is not present in prior but limited due to artifact.    Radiology Data Reviewed: yes  Pertinent Findings:  CXR (4/10/22): Acute cardiopulmonary  disease    Current Medications:     Infusions:   lactated ringers 75 mL/hr at 04/11/22 1450        Scheduled:   amiodarone  100 mg Oral Daily    atorvastatin  80 mg Oral Daily    levothyroxine  25 mcg Oral Before breakfast    mupirocin   Nasal BID    pantoprazole  40 mg Intravenous BID        PRN:  sodium chloride, acetaminophen, sodium chloride 0.9%    Assessment:     Rex Song is a 77 y.o. male with HFrEF (45% 4/2022), HTN, HLD/PVD, CAD s/p PCI LCX and PDA (2000), unspecified afib, AAA (2.4 cm 2022, stable from 2019), CKD4, MS admitted for symptomatic anemia in setting of GI bleed with anticoag/antiplatelet.    Patient pending colonoscopy today     Plan:     GI bleed  - In setting of plavix, xarelto. Hold while inpatient. No prior history of GIB.   - Iron panel consistent with acute blood loss.   - S/p 3U pRBC with appropriate hgb response. Repeat CBC pending to assess continued active bleed.   - Hgb goal >8 given h/o CAD  - If requiring >3U pRBC, will also consider giving FFP   - 2 large IV bore access  - Continue IV PPI bid pending scope  - EGD yesterday with Schatzki ring, but no source of bleed found.   - NPO for colonoscopy with GI today  - GI following     Hypotension, resolved  HTN  - Likely hypovolemic shock from GIB with inappropriate HR response due to beta blocker  - Hold antihypertensives in setting of hypotension, restart prn  - S/p resuscitation as above     Acute kidney injury on CKD4, improving  - Baseline BUN/Cr: 15-19/1.7-1.9  - BUN/Cr on admit: 51/3.4  - Likely in setting of hypovolemia, improving with pRBC  - If no improvement, obtain urine studies  - Continue to trend while inpatient     Abnormal EKG  - EKG: Normal sinus, incomplete LBBB, prolonged QTc 505, ?ST depression in lateral leads that is not present in prior but limited due to artifact.   - Trop: 0.037, stable  - Likely in setting of RANDY on CKD with demand ischemia in setting of severe anemia     Hypothyroidism  - TSH 23   T4 0.47  - Start synthroid  - Repeat thyroid studies in 6 weeks    HFrEF (TTE 45% 4/2022)  - Home chlorthalidone, irbesartan, toprol XR held in setting of hypotension. Resume once normotensive  - Baseline CXR without evidence of pulmonary edema  - No respiratory distress with transfusion  - Consider repeat TTE if remains hypotensive despite stable H/H     CAD s/p PCI LCX and PDA (2000),  - Wife reports ASA discontinued and has only been on plavix   - Unclear if patient had PFA-100 assay done  - Will defer to cardiologist regarding Plavix vs ASA upon discharge     Unspecified afib  - Home med: amio and toprol   - Will continue amio and hold toprol while hypotensive  - EKG with normal sinus     HLD  - Lipid panel cholesterol: 98  HDL 29  LDL 43.6    - Continue home high intensity statin    Tobacco use  - Reports having quit 7 days ago  - Encouraged cessation counseling  - Nicotine patch     MS  - No acute issues      HCM  - A1c: 5.2%     Social: Update family prn     Code: Full  Dispo: Pending colonoscopy. Follow up with GI and cards as outpatient     Yumiko Burnett MD  [facility], [facility]    [facility] Medicine Hospitalist Pager numbers:   [facility] Hospitalist Medicine Team A (Janak/Kimberly): [phone]  [facility] Hospitalist Medicine Team B (Esteban/Dipika):  [phone]

## 2022-04-12 NOTE — PROGRESS NOTES
Progress Note  Nephrology      Consult Requested By: Melly Briceno MD      SUBJECTIVE:     Overnight events  Patient is a 77 y.o. male     Patient Active Problem List   Diagnosis    CAD (coronary artery disease)    Post PTCA    Essential hypertension    Mixed hyperlipidemia    AAA (abdominal aortic aneurysm)    PVD (peripheral vascular disease)    Multiple sclerosis    Dysphagia    Colon cancer screening    Benign neoplasm of colon    Screening for colon cancer    CKD (chronic kidney disease) stage 4, GFR 15-29 ml/min    Tobacco abuse    Complex renal cyst    Atrial fibrillation    HFrEF (heart failure with reduced ejection fraction)    Acute renal injury    Hypothyroidism    Thrombocytopenia    GI bleed    Acute blood loss anemia    Anemia    Weakness     Past Medical History:   Diagnosis Date    CHF (congestive heart failure)     Hypertension     Personal history of colonic polyps 2005    PVD (peripheral vascular disease)               OBJECTIVE:     Vitals:    04/12/22 1315 04/12/22 1330 04/12/22 1427 04/12/22 1533   BP: (!) 106/45 (!) 118/45 136/60 (!) 105/56   BP Location:  Left arm     Patient Position:  Lying  Sitting   Pulse: 61 73 77 75   Resp: 16 16 18 18   Temp:    98.4 °F (36.9 °C)   TempSrc:  Skin  Oral   SpO2: 100% 100% 98%    Weight:       Height:           Temp: 98.4 °F (36.9 °C) (04/12/22 1533)  Pulse: 75 (04/12/22 1533)  Resp: 18 (04/12/22 1533)  BP: (!) 105/56 (04/12/22 1533)  SpO2:  (pt unavailable) (04/12/22 1609)              Medications:   amiodarone  100 mg Oral Daily    atorvastatin  80 mg Oral Daily    ferrous sulfate  1 tablet Oral Daily    levothyroxine  25 mcg Oral Before breakfast    mupirocin   Nasal BID    pantoprazole  40 mg Intravenous BID      lactated ringers 75 mL/hr at 04/11/22 1450               Physical Exam:  General appearance:NAD  Feeling better  Lungs: diminished breath sounds  Heart: pulse 75  Abdomen: soft  Extremities: no edema  Skin:  dry      Laboratory:  ABG  Labs reviewed  No results found for this or any previous visit (from the past 336 hour(s)).  Recent Results (from the past 336 hour(s))   CBC auto differential    Collection Time: 04/12/22  4:38 AM   Result Value Ref Range    WBC 7.21 3.90 - 12.70 K/uL    Hemoglobin 8.9 (L) 14.0 - 18.0 g/dL    Hematocrit 28.1 (L) 40.0 - 54.0 %    Platelets 128 (L) 150 - 450 K/uL   CBC auto differential    Collection Time: 04/11/22  8:11 AM   Result Value Ref Range    WBC 6.88 3.90 - 12.70 K/uL    Hemoglobin 8.4 (L) 14.0 - 18.0 g/dL    Hematocrit 26.6 (L) 40.0 - 54.0 %    Platelets 120 (L) 150 - 450 K/uL   CBC auto differential    Collection Time: 04/11/22  3:56 AM   Result Value Ref Range    WBC 8.68 3.90 - 12.70 K/uL    Hemoglobin 8.4 (L) 14.0 - 18.0 g/dL    Hematocrit 26.9 (L) 40.0 - 54.0 %    Platelets 128 (L) 150 - 450 K/uL     Urinalysis  No results for input(s): COLORU, CLARITYU, SPECGRAV, PHUR, PROTEINUA, GLUCOSEU, BILIRUBINCON, BLOODU, WBCU, RBCU, BACTERIA, MUCUS, NITRITE, LEUKOCYTESUR, UROBILINOGEN, HYALINECASTS in the last 24 hours.    Diagnostic Results:  X-Ray: Reviewed  US: Reviewed  Echo: Reviewed  ACCESS    ASSESSMENT/PLAN:     RANDY/ CKD 4  UO 1150 cc  Creatinine 2.4  GFR 25 cc/min  Azotemia  BUN 37  Metabolic acidosis  Metabolic bone disease  Corrected Ca approximately 9.2  Poor nutrition  Albumin 3     Anemia  Hb 8.9  Iron 10  Venofer     Hypothyroid     Echo 2022  · The left ventricle is mildly enlarged with eccentric hypertrophy and mildly decreased systolic function.  · The estimated ejection fraction is 45%.  · Normal right ventricular size with normal right ventricular systolic function.  · Severe left atrial enlargement.  · Grade I left ventricular diastolic dysfunction.  · Normal central venous pressure (3 mmHg).  · The estimated PA systolic pressure is 33 mmHg.  · CXR-   No convincing evidence of acute cardiopulmonary disease.     Hypotensive /56     Weight daily  I and  O  Avoid hypotension, hypovolemia, nephrotoxic agents

## 2022-04-12 NOTE — PLAN OF CARE
[facility]     Colonoscopy complete. No bleeding source seen on exam. Polyp seen, not removed due to current workup of anemia and concern for GI bleed. Dicussed evaluation of small bowel with patient and family, prefer outpatient evaluation.     Plan:   - Follow up outpatient with plans for video capsule endoscopy and repeat colonoscopy for polyp removal   - Regular diet   - Ok for discharge from GI standpoint       Marline Darling   [facility]

## 2022-04-12 NOTE — PT/OT/SLP PROGRESS
Physical Therapy      Patient Name:  Rex Song   MRN:  [id number]    Patient not seen today secondary to  (pt TAMIA in testing(0188)). Will follow-up tomorrow.

## 2022-04-12 NOTE — PT/OT/SLP PROGRESS
Occupational Therapy      Patient Name:  Rex Song   MRN:  [id number]    Patient not seen today secondary to Off the floor for procedure/surgery. *.    4/12/2022

## 2022-04-12 NOTE — ANESTHESIA PREPROCEDURE EVALUATION
04/12/2022  Rex Song is a 77 y.o., male, admitted for acute GI Bleed, RANDY. S/p EGD 4/11/22    Scheduled for colonoscopy under MAC.    Initial Hgb 4.2 s/p 3 units 4/10/22, now stable at 8.9 without need for additional units.      Past Medical History:   Diagnosis Date    CHF (congestive heart failure)     Hypertension     Personal history of colonic polyps 2005    PVD (peripheral vascular disease)      Past Surgical History:   Procedure Laterality Date    CORONARY ANGIOPLASTY WITH STENT PLACEMENT  08/18/2000    LCX stent/ PDA stent     There were no vitals filed for this visit.      Pre-op Assessment    I have reviewed the Patient Summary Reports.    I have reviewed the NPO Status.   I have reviewed the Medications.     Review of Systems  Anesthesia Hx:  No problems with previous Anesthesia   Denies Personal Hx of Anesthesia complications.   Social:  Former Smoker    Hematology/Oncology:         -- Anemia:   Cardiovascular:   Exercise tolerance: good Hypertension CAD  CABG/stent  CHF ECG has been reviewed.    Pulmonary:   Shortness of breath Recent URI: Endorses SOB at rest, currently on 2LNC.    Renal/:   Chronic Renal Disease, ARF    Neurological:  Neurology Normal    Endocrine:   Hypothyroidism        Physical Exam  General: Well nourished    Airway:  Mallampati: II   Mouth Opening: Normal  TM Distance: Normal  Tongue: Normal  Neck ROM: Normal ROM    Dental:  Periodontal disease    Chest/Lungs:  Normal Respiratory Rate  Placed on 2L NC  Heart:  Rate: Normal  Rhythm: Regular Rhythm      Lab Results   Component Value Date    WBC 7.21 04/12/2022    HGB 8.9 (L) 04/12/2022    HCT 28.1 (L) 04/12/2022     (L) 04/12/2022    CHOL 98 (L) 04/11/2022    TRIG 127 04/11/2022    HDL 29 (L) 04/11/2022    ALT 12 04/12/2022    AST 25 04/12/2022     04/12/2022    K 4.2 04/12/2022     (H)  04/12/2022    CREATININE 2.4 (H) 04/12/2022    BUN 37 (H) 04/12/2022    CO2 20 (L) 04/12/2022    TSH 23.839 (H) 04/11/2022    INR 1.0 02/20/2005    HGBA1C 5.2 04/11/2022 4/5/22  · The left ventricle is mildly enlarged with eccentric hypertrophy and mildly decreased systolic function.  · The estimated ejection fraction is 45%.  · Normal right ventricular size with normal right ventricular systolic function.  · Severe left atrial enlargement.  · Grade I left ventricular diastolic dysfunction.  · Normal central venous pressure (3 mmHg).  · The estimated PA systolic pressure is 33 mmHg.      Anesthesia Plan  Type of Anesthesia, risks & benefits discussed:    Anesthesia Type: MAC, Gen ETT  Intra-op Monitoring Plan: Standard ASA Monitors  Informed Consent: Informed consent signed with the Patient and all parties understand the risks and agree with anesthesia plan.  All questions answered.   ASA Score: 3    Ready For Surgery From Anesthesia Perspective.       Anesthesia Plan  Type of Anesthesia, risks & benefits discussed:    Anesthesia Type: MAC  Intra-op Monitoring Plan: Standard ASA Monitors  Informed Consent: Informed consent signed with the Patient and all parties understand the risks and agree with anesthesia plan.  All questions answered.   ASA Score: 3  Day of Surgery Review of History & Physical: H&P Update referred to the surgeon/provider.    Ready For Surgery From Anesthesia Perspective.     .

## 2022-04-12 NOTE — DISCHARGE SUMMARY
\[facility]\"" Hospital Medicine Discharge Summary    Primary Team: \[facility]\"" Hospitalist Team A  Attending Physician: Melly Briceno MD  Resident: Dr. Burnett  Intern: Dr. Srivastava    Date of Admit: 4/10/2022  Date of Discharge: 4/12/2022    Discharge to: Home  Condition: Stable    Discharge Diagnoses     Patient Active Problem List   Diagnosis    CAD (coronary artery disease)    Post PTCA    Essential hypertension    Mixed hyperlipidemia    AAA (abdominal aortic aneurysm)    PVD (peripheral vascular disease)    Multiple sclerosis    Dysphagia    Colon cancer screening    Benign neoplasm of colon    Screening for colon cancer    CKD (chronic kidney disease) stage 4, GFR 15-29 ml/min    Tobacco abuse    Complex renal cyst    Atrial fibrillation    HFrEF (heart failure with reduced ejection fraction)    Acute renal injury    Hypothyroidism    Thrombocytopenia    GI bleed    Acute blood loss anemia    Anemia    Weakness     Consultants and Procedures     Consultants:  Gastroenterology    Procedures:   EGD (4/11)  - Low-grade of narrowing Schatzki ring.   - Normal stomach.   - Normal examined duodenum.   - No specimens collected.   - No source of bleeding found on exam.     Colonoscopy (4/12):  - One 10 mm polyp at the splenic flexure.   - A tattoo was seen in the ascending colon.   - The examination was otherwise normal.   - No specimens collected.     Imaging:  CXR (4/10/22): No acute cardiopulmonary disease    Brief History of Present Illness      Rex Song is a 77 y.o. male with HFrEF (45% 4/2022), HTN, HLD/PVD, CAD s/p PCI LCX and PDA (2000), unspecified afib, AAA (2.4 cm 2022, stable from 2019), MS presenting on 4/10/2022 with primary complaint of fatigue x2wk.      Patient was in his usual state of health (independent ADL, walks with cane) until 2 weeks ago, when he started to become weak that progressed to be accompanied with shortness of breath. This morning, he was coming in from the garage and his  wife saw him holding onto the door as if he was weak. He was nauseous and tried to vomit, but had no emesis. He went to the bathroom and had watery black stool, which prompted family to bring him to the hospital. Patient denies constipation. Denies history of GIB.     For the full HPI please refer to the History & Physical from this admission.    Hospital Course By Problem with Pertinent Findings     Patient admitted for symptomatic anemia 2/2 melena from anticoag/antiplatelet use.     GI bleed  Iron deficient anemia  - In setting of plavix, xarelto. Held while inpatient. No prior history of GIB.   - Iron panel consistent with acute blood loss.   - Started oral iron supplements  - Recommend IV iron as outpatient  - S/p IVF bolus while awaiting blood product. S/p 3U pRBC with appropriate hgb response. Stable Hgb x3 days  - Hgb goal >8 given h/o CAD  - IV PPI bid while inpatient --> May resume home omeprazole for GERD as no bleeding identified on EGD  - EGD and colonoscopy without source of bleed identified  - Follow up with GI as outpatient for video capsule endoscopy and repeat colonoscopy for polyp removal  - Resumed plavix on discharge as Hgb stable, but switched xarelto --> eliquis for lower risk of bleeding     Hypotension, resolved  HTN  - Likely hypovolemic shock from GIB with inappropriate HR response due to beta blocker  - Hold antihypertensives in setting of hypotension  - Restart on discharge  - S/p resuscitation as above     Acute kidney injury on CKD4, improving  - Baseline BUN/Cr: 15-19/1.7-1.9  - BUN/Cr on admit: 51/3.4  - Likely in setting of hypovolemia, improving with pRBC  - If no improvement, obtain urine studies  - Continue to downtrend while inpatient  - Continue to trend Cr as outpatient to confirm return to baseline     Abnormal EKG  - EKG: Normal sinus, incomplete LBBB, prolonged QTc 505, ?ST depression in lateral leads that is not present in prior but limited due to artifact.   - Trop:  0.037, stable  - Likely in setting of RANDY on CKD with demand ischemia in setting of severe anemia     Hypothyroidism, new diagnosis  - TSH 23  T4 0.47  - Started synthroid  - Repeat thyroid studies in 6 weeks (~5/16) as outpatient with PCP     HFrEF (TTE 45% 4/2022)  - Home chlorthalidone, irbesartan, toprol XR held in setting of hypotension. Resume once normotensive  - Baseline CXR without evidence of pulmonary edema  - No respiratory distress with transfusion     CAD s/p PCI LCX and PDA (2000),  - Wife reports ASA discontinued and has only been on plavix   - Unclear if patient had PFA-100 assay done  - Will defer to cardiologist regarding Plavix vs ASA upon discharge     Unspecified afib  - Home med: amio, toprol, xarelto  - Held toprol, xarelto while hypotensive inpatient  - Anticoag as above  - Resume toprol upon discharge  - EKG with normal sinus     HLD  - Lipid panel cholesterol: 98  HDL 29  LDL 43.6    - Continue home high intensity statin     Tobacco use  - Reports having quit 7 days ago  - Encouraged cessation counseling  - Nicotine patch     MS  - No acute issues      HCM  - A1c: 5.2%      Discharge Medications        Medication List      START taking these medications    apixaban 5 mg Tab  Commonly known as: ELIQUIS  Take 1 tablet (5 mg total) by mouth 2 (two) times daily.     ferrous sulfate 325 (65 FE) MG EC tablet  Take 1 tablet (325 mg total) by mouth once daily.     levothyroxine 25 MCG tablet  Commonly known as: SYNTHROID  Take 1 tablet (25 mcg total) by mouth before breakfast.        CHANGE how you take these medications    amLODIPine 5 MG tablet  Commonly known as: NORVASC  TAKE 1 TABLET EVERY DAY  What changed:   · how much to take  · how to take this  · when to take this  · additional instructions     chlorthalidone 25 MG Tab  Commonly known as: HYGROTEN  TAKE 1 TABLET ONE TIME DAILY  What changed:   · how much to take  · how to take this  · when to take this        CONTINUE taking  these medications    amiodarone 200 MG Tab  Commonly known as: PACERONE  Take 0.5 tablets (100 mg total) by mouth once daily.     cilostazoL 100 MG Tab  Commonly known as: PLETAL  TAKE 1 TABLET (100 MG TOTAL) BY MOUTH 2 (TWO) TIMES DAILY BEFORE MEALS.     clopidogreL 75 mg tablet  Commonly known as: PLAVIX  Take 1 tablet (75 mg total) by mouth once daily.     irbesartan 150 MG tablet  Commonly known as: AVAPRO  Take 1 tablet (150 mg total) by mouth every evening.     metoprolol succinate 25 MG 24 hr tablet  Commonly known as: TOPROL-XL  Take 1 tablet (25 mg total) by mouth once daily.     nitroGLYCERIN 0.4 MG SL tablet  Commonly known as: NITROSTAT  Place 1 tablet (0.4 mg total) under the tongue every 5 (five) minutes as needed.     omeprazole 40 MG capsule  Commonly known as: PRILOSEC  TAKE 1 CAPSULE EVERY MORNING     rosuvastatin 40 MG Tab  Commonly known as: CRESTOR  TAKE 1 TABLET (40 MG TOTAL) BY MOUTH ONCE DAILY.        STOP taking these medications    rivaroxaban 20 mg Tab  Commonly known as: XARELTO           Where to Get Your Medications      These medications were sent to [facility] [place]  [address], [place] [address]    Hours: Mon-Fri, 8a-5:30p Phone: [phone]   · apixaban 5 mg Tab     You can get these medications from any pharmacy    Bring a paper prescription for each of these medications  · ferrous sulfate 325 (65 FE) MG EC tablet  · levothyroxine 25 MCG tablet          Discharge Information:   Diet:  Cardiac    Physical Activity:  As tolerated             Instructions:  1. Take all medications as prescribed  2. Keep all follow-up appointments  3. Return to the hospital or call your primary care physicians if any worsening symptoms such as fever, chest pain, shortness of breath, return of symptoms, or any other concerns.    Follow-Up Appointments:  PCP (establish care with Dr. Diaz) - 4/20  Renal (Dr. Hackett)  Cards (Dr. Jones)  - 4/28  SHIMA Burnett MD  [facility]  Med-Peds, 3

## 2022-04-12 NOTE — NURSING
Pt refusing to complete Golytely. Only half consumed, will continue reiterate the importance to complete for colonoscopy.

## 2022-04-12 NOTE — ANESTHESIA POSTPROCEDURE EVALUATION
Anesthesia Post Evaluation    Patient: Rex Song    Procedure(s) Performed: Procedure(s) (LRB):  COLONOSCOPY (N/A)    Final Anesthesia Type: MAC      Patient location during evaluation: GI PACU  Patient participation: Yes- Able to Participate  Level of consciousness: awake and alert  Post-procedure vital signs: reviewed and stable  Pain management: adequate  Airway patency: patent  [place] mitigation strategies: Multimodal analgesia  PONV status at discharge: No PONV  Anesthetic complications: no      Cardiovascular status: hemodynamically stable and blood pressure returned to baseline  Respiratory status: room air, unassisted and spontaneous ventilation  Hydration status: euvolemic  Follow-up not needed.          Vitals Value Taken Time   /45 04/12/22 1330   Temp 37.2 °C (99 °F) 04/12/22 1300   Pulse 73 04/12/22 1330   Resp 16 04/12/22 1330   SpO2 100 % 04/12/22 1330         Event Time   Out of Recovery 04/12/2022 13:46:14         Pain/Al Score: Al Score: 9 (4/12/2022  1:00 PM)

## 2022-04-12 NOTE — PROVATION PATIENT INSTRUCTIONS
Discharge Summary/Instructions after an Endoscopic Procedure  Patient Name: Rex Song  Patient MRN: [id number]  Patient YOB: 1945 Tuesday, April 12, 2022  Earl Hall MD  Dear patient,  As a result of recent federal legislation (The Federal Cures Act), you may   receive lab or pathology results from your procedure in your [facility]   account before your physician is able to contact you. Your physician or   their representative will relay the results to you with their   recommendations at their soonest availability.  Thank you,  Your health is very important to us during the Covid Crisis. Following your   procedure today, you will receive a daily text for 2 weeks asking about   signs or symptoms of Covid 19.  Please respond to this text when you   receive it so we can follow up and keep you as safe as possible.   RESTRICTIONS:  During your procedure today, you received medications for sedation.  These   medications may affect your judgment, balance and coordination.  Therefore,   for 24 hours, you have the following restrictions:   - DO NOT drive a car, operate machinery, make legal/financial decisions,   sign important papers or drink alcohol.    ACTIVITY:  Today: no heavy lifting, straining or running due to procedural   sedation/anesthesia.  The following day: return to full activity including work.  DIET:  Eat and drink normally unless instructed otherwise.     TREATMENT FOR COMMON SIDE EFFECTS:  - Mild abdominal pain, nausea, belching, bloating or excessive gas:  rest,   eat lightly and use a heating pad.  - Sore Throat: treat with throat lozenges and/or gargle with warm salt   water.  - Because air was used during the procedure, expelling large amounts of air   from your rectum or belching is normal.  - If a bowel prep was taken, you may not have a bowel movement for 1-3 days.    This is normal.  SYMPTOMS TO WATCH FOR AND REPORT TO YOUR PHYSICIAN:  1. Abdominal pain or bloating, other than  gas cramps.  2. Chest pain.  3. Back pain.  4. Signs of infection such as: chills or fever occurring within 24 hours   after the procedure.  5. Rectal bleeding, which would show as bright red, maroon, or black stools.   (A tablespoon of blood from the rectum is not serious, especially if   hemorrhoids are present.)  6. Vomiting.  7. Weakness or dizziness.  GO DIRECTLY TO THE NEAREST EMERGENCY ROOM IF YOU HAVE ANY OF THE FOLLOWING:      Difficulty breathing              Chills and/or fever over 101 F   Persistent vomiting and/or vomiting blood   Severe abdominal pain   Severe chest pain   Black, tarry stools   Bleeding- more than one tablespoon   Any other symptom or condition that you feel may need urgent attention  Your doctor recommends these additional instructions:  If any biopsies were taken, your doctors clinic will contact you in 1 to 2   weeks with any results.  - Discharge patient to home.   - Resume previous diet.   - Continue present medications.   - To visualize the small bowel, perform video capsule endoscopy at   appointment to be scheduled. (outpatient)  - Repeat colonoscopy in 3 months for retreatment/ polypectomy. Polyps not   the etiology of anemia and polypectomy would potentially confuse clinical   picture of GI bleeding etiology  For questions, problems or results please call your physician - Earl Hall MD.  EMERGENCY PHONE NUMBER: [phone],  LAB RESULTS: [phone]  IF A COMPLICATION OR EMERGENCY SITUATION ARISES AND YOU ARE UNABLE TO REACH   YOUR PHYSICIAN - GO DIRECTLY TO THE EMERGENCY ROOM.  Earl Hall MD  4/12/2022 1:07:57 PM  This report has been verified and signed electronically.  Dear patient,  As a result of recent federal legislation (The Federal Cures Act), you may   receive lab or pathology results from your procedure in your [facility]   account before your physician is able to contact you. Your physician or   their representative will relay the results to you  with their   recommendations at their soonest availability.  Thank you,  PROVATION

## 2022-04-12 NOTE — PT/OT/SLP PROGRESS
Occupational Therapy      Patient Name:  Rex Song   MRN:  [id number]    Patient not seen today secondary to Bowel/bladder accident (colonoscopy prep in process).   4/12/2022

## 2022-04-12 NOTE — TRANSFER OF CARE
Anesthesia Transfer of Care Note    Patient: Rex Song    Procedure(s) Performed: Procedure(s) (LRB):  COLONOSCOPY (N/A)    Patient location: GI    Anesthesia Type: MAC    Transport from OR: Transported from OR on room air with adequate spontaneous ventilation    Post pain: adequate analgesia    Post assessment: no apparent anesthetic complications    Post vital signs: stable    Level of consciousness: awake    Nausea/Vomiting: no nausea/vomiting    Complications: none    Transfer of care protocol was followed      Last vitals:   Visit Vitals  BP (!) 108/48   Pulse 73   Temp 37.1 °C (98.8 °F)   Resp 18   Ht 5' (1.524 m)   Wt 60.5 kg (133 lb 6.1 oz)   SpO2 100%   BMI 26.05 kg/m²

## 2022-04-12 NOTE — H&P
Short Stay Endoscopy History and Physical    PCP - Primary Doctor No  Referring Physician - Aaareferral Self  No address on file    Procedure - colonoscopy  ASA - per anesthesia  Mallampati - per anesthesia  History of Anesthesia problems - no  Family history Anesthesia problems -  no   Plan of anesthesia - General    HPI:  This is a 77 y.o. male here for evaluation of: anemia    Reflux - no  Dysphagia - no  Abdominal pain - no  Diarrhea - no    ROS:  Constitutional: No fevers, chills, No weight loss  CV: No chest pain  Pulm: No cough, No shortness of breath  Ophtho: No vision changes  GI: see HPI  Derm: No rash    Medical History:  has a past medical history of CHF (congestive heart failure), Hypertension, Personal history of colonic polyps (2005), and PVD (peripheral vascular disease).    Surgical History:  has a past surgical history that includes Coronary angioplasty with stent (08/18/2000) and Esophagogastroduodenoscopy (N/A, 4/11/2022).    Family History: family history includes Prostate cancer (age of onset: 59) in his brother..    Social History:  reports that he has quit smoking. His smoking use included cigarettes. He smoked 0.25 packs per day. He has never used smokeless tobacco. He reports that he does not drink alcohol.    Review of patient's allergies indicates:   Allergen Reactions    Cortisone      Other reaction(s): Itching       Medications:   Medications Prior to Admission   Medication Sig Dispense Refill Last Dose    amiodarone (PACERONE) 200 MG Tab Take 0.5 tablets (100 mg total) by mouth once daily. 45 tablet 3 4/10/2022 at Unknown time    amLODIPine (NORVASC) 5 MG tablet TAKE 1 TABLET EVERY DAY (Patient taking differently: Take 5 mg by mouth once daily.) 90 tablet 3 4/10/2022 at Unknown time    chlorthalidone (HYGROTEN) 25 MG Tab TAKE 1 TABLET ONE TIME DAILY (Patient taking differently: Take 25 mg by mouth once daily. TAKE 1 TABLET ONE TIME DAILY) 90 tablet 3 4/10/2022 at Unknown time     cilostazol (PLETAL) 100 MG Tab TAKE 1 TABLET (100 MG TOTAL) BY MOUTH 2 (TWO) TIMES DAILY BEFORE MEALS. 60 tablet 11 4/10/2022 at Unknown time    clopidogreL (PLAVIX) 75 mg tablet Take 1 tablet (75 mg total) by mouth once daily. 90 tablet 3 4/10/2022 at Unknown time    irbesartan (AVAPRO) 150 MG tablet Take 1 tablet (150 mg total) by mouth every evening. 90 tablet 3 4/10/2022 at Unknown time    metoprolol succinate (TOPROL-XL) 25 MG 24 hr tablet Take 1 tablet (25 mg total) by mouth once daily. 90 tablet 3 4/10/2022 at Unknown time    omeprazole (PRILOSEC) 40 MG capsule TAKE 1 CAPSULE EVERY MORNING (Patient taking differently: Take 40 mg by mouth every morning.) 90 capsule 3 4/10/2022 at Unknown time    rivaroxaban (XARELTO) 20 mg Tab Take 1 tablet (20 mg total) by mouth once daily. 30 tablet 11 4/10/2022 at Unknown time    rosuvastatin (CRESTOR) 40 MG Tab TAKE 1 TABLET (40 MG TOTAL) BY MOUTH ONCE DAILY. 90 tablet 3 4/10/2022 at Unknown time    nitroGLYCERIN (NITROSTAT) 0.4 MG SL tablet Place 1 tablet (0.4 mg total) under the tongue every 5 (five) minutes as needed. 25 tablet 5 More than a month at Unknown time       Physical Exam:    Vital Signs:   Vitals:    04/12/22 0800   BP:    Pulse: 77   Resp:    Temp:        General Appearance: Well appearing in no acute distress    Labs:  Lab Results   Component Value Date    WBC 7.21 04/12/2022    HGB 8.9 (L) 04/12/2022    HCT 28.1 (L) 04/12/2022     (L) 04/12/2022    CHOL 98 (L) 04/11/2022    TRIG 127 04/11/2022    HDL 29 (L) 04/11/2022    ALT 12 04/12/2022    AST 25 04/12/2022     04/12/2022    K 4.2 04/12/2022     (H) 04/12/2022    CREATININE 2.4 (H) 04/12/2022    BUN 37 (H) 04/12/2022    CO2 20 (L) 04/12/2022    TSH 23.839 (H) 04/11/2022    INR 1.0 02/20/2005    HGBA1C 5.2 04/11/2022       I have explained the risks and benefits of this endoscopic procedure to the patient including but not limited to bleeding, inflammation, infection,  perforation, and death.      Earl Hall MD

## 2022-04-13 VITALS
OXYGEN SATURATION: 93 % | HEART RATE: 95 BPM | BODY MASS INDEX: 27.57 KG/M2 | WEIGHT: 140.44 LBS | TEMPERATURE: 98 F | HEIGHT: 60 IN | DIASTOLIC BLOOD PRESSURE: 79 MMHG | SYSTOLIC BLOOD PRESSURE: 124 MMHG | RESPIRATION RATE: 18 BRPM

## 2022-04-13 DIAGNOSIS — D50.8 OTHER IRON DEFICIENCY ANEMIA: Primary | ICD-10-CM

## 2022-04-13 LAB
ALBUMIN SERPL BCP-MCNC: 2.8 G/DL (ref 3.5–5.2)
ALP SERPL-CCNC: 74 U/L (ref 55–135)
ALT SERPL W/O P-5'-P-CCNC: 15 U/L (ref 10–44)
ANION GAP SERPL CALC-SCNC: 10 MMOL/L (ref 8–16)
AST SERPL-CCNC: 30 U/L (ref 10–40)
BASOPHILS # BLD AUTO: 0.03 K/UL (ref 0–0.2)
BASOPHILS NFR BLD: 0.6 % (ref 0–1.9)
BILIRUB SERPL-MCNC: 0.5 MG/DL (ref 0.1–1)
BUN SERPL-MCNC: 25 MG/DL (ref 8–23)
CALCIUM SERPL-MCNC: 8.7 MG/DL (ref 8.7–10.5)
CHLORIDE SERPL-SCNC: 108 MMOL/L (ref 95–110)
CO2 SERPL-SCNC: 23 MMOL/L (ref 23–29)
CREAT SERPL-MCNC: 2.2 MG/DL (ref 0.5–1.4)
DIFFERENTIAL METHOD: ABNORMAL
EOSINOPHIL # BLD AUTO: 0.1 K/UL (ref 0–0.5)
EOSINOPHIL NFR BLD: 1.4 % (ref 0–8)
ERYTHROCYTE [DISTWIDTH] IN BLOOD BY AUTOMATED COUNT: 17.6 % (ref 11.5–14.5)
EST. GFR  (AFRICAN AMERICAN): 32 ML/MIN/1.73 M^2
EST. GFR  (NON AFRICAN AMERICAN): 27 ML/MIN/1.73 M^2
GLUCOSE SERPL-MCNC: 99 MG/DL (ref 70–110)
HCT VFR BLD AUTO: 28.5 % (ref 40–54)
HGB BLD-MCNC: 8.8 G/DL (ref 14–18)
IMM GRANULOCYTES # BLD AUTO: 0.01 K/UL (ref 0–0.04)
IMM GRANULOCYTES NFR BLD AUTO: 0.2 % (ref 0–0.5)
LYMPHOCYTES # BLD AUTO: 0.7 K/UL (ref 1–4.8)
LYMPHOCYTES NFR BLD: 13.2 % (ref 18–48)
MCH RBC QN AUTO: 24.7 PG (ref 27–31)
MCHC RBC AUTO-ENTMCNC: 30.9 G/DL (ref 32–36)
MCV RBC AUTO: 80 FL (ref 82–98)
MONOCYTES # BLD AUTO: 0.6 K/UL (ref 0.3–1)
MONOCYTES NFR BLD: 12.4 % (ref 4–15)
NEUTROPHILS # BLD AUTO: 3.6 K/UL (ref 1.8–7.7)
NEUTROPHILS NFR BLD: 72.2 % (ref 38–73)
NRBC BLD-RTO: 0 /100 WBC
PLATELET # BLD AUTO: 109 K/UL (ref 150–450)
PMV BLD AUTO: 11.3 FL (ref 9.2–12.9)
POTASSIUM SERPL-SCNC: 3.8 MMOL/L (ref 3.5–5.1)
PROT SERPL-MCNC: 5.6 G/DL (ref 6–8.4)
RBC # BLD AUTO: 3.56 M/UL (ref 4.6–6.2)
SODIUM SERPL-SCNC: 141 MMOL/L (ref 136–145)
WBC # BLD AUTO: 4.99 K/UL (ref 3.9–12.7)

## 2022-04-13 PROCEDURE — 25000003 PHARM REV CODE 250: Performed by: STUDENT IN AN ORGANIZED HEALTH CARE EDUCATION/TRAINING PROGRAM

## 2022-04-13 PROCEDURE — 97110 THERAPEUTIC EXERCISES: CPT | Mod: CQ

## 2022-04-13 PROCEDURE — 85025 COMPLETE CBC W/AUTO DIFF WBC: CPT | Performed by: STUDENT IN AN ORGANIZED HEALTH CARE EDUCATION/TRAINING PROGRAM

## 2022-04-13 PROCEDURE — 80053 COMPREHEN METABOLIC PANEL: CPT | Performed by: STUDENT IN AN ORGANIZED HEALTH CARE EDUCATION/TRAINING PROGRAM

## 2022-04-13 PROCEDURE — 97116 GAIT TRAINING THERAPY: CPT | Mod: CQ

## 2022-04-13 PROCEDURE — C9113 INJ PANTOPRAZOLE SODIUM, VIA: HCPCS | Performed by: STUDENT IN AN ORGANIZED HEALTH CARE EDUCATION/TRAINING PROGRAM

## 2022-04-13 PROCEDURE — 36415 COLL VENOUS BLD VENIPUNCTURE: CPT | Performed by: STUDENT IN AN ORGANIZED HEALTH CARE EDUCATION/TRAINING PROGRAM

## 2022-04-13 PROCEDURE — 63600175 PHARM REV CODE 636 W HCPCS: Performed by: STUDENT IN AN ORGANIZED HEALTH CARE EDUCATION/TRAINING PROGRAM

## 2022-04-13 RX ORDER — FERROUS SULFATE 325(65) MG
325 TABLET, DELAYED RELEASE (ENTERIC COATED) ORAL DAILY
Qty: 30 TABLET | Refills: 3 | Status: SHIPPED | OUTPATIENT
Start: 2022-04-13 | End: 2023-08-02

## 2022-04-13 RX ORDER — LEVOTHYROXINE SODIUM 25 UG/1
25 TABLET ORAL
Qty: 30 TABLET | Refills: 11 | Status: SHIPPED | OUTPATIENT
Start: 2022-04-13 | End: 2023-04-27

## 2022-04-13 RX ADMIN — METOPROLOL SUCCINATE 12.5 MG: 25 TABLET, EXTENDED RELEASE ORAL at 01:04

## 2022-04-13 RX ADMIN — AMIODARONE HYDROCHLORIDE 100 MG: 100 TABLET ORAL at 08:04

## 2022-04-13 RX ADMIN — ATORVASTATIN CALCIUM 80 MG: 40 TABLET, FILM COATED ORAL at 08:04

## 2022-04-13 RX ADMIN — PANTOPRAZOLE SODIUM 40 MG: 40 INJECTION, POWDER, LYOPHILIZED, FOR SOLUTION INTRAVENOUS at 08:04

## 2022-04-13 RX ADMIN — MUPIROCIN: 20 OINTMENT TOPICAL at 08:04

## 2022-04-13 RX ADMIN — FERROUS SULFATE TAB 325 MG (65 MG ELEMENTAL FE) 1 EACH: 325 (65 FE) TAB at 08:04

## 2022-04-13 RX ADMIN — LEVOTHYROXINE SODIUM 25 MCG: 25 TABLET ORAL at 06:04

## 2022-04-13 RX ADMIN — METOPROLOL SUCCINATE 12.5 MG: 25 TABLET, EXTENDED RELEASE ORAL at 11:04

## 2022-04-13 NOTE — PLAN OF CARE
CM met with pt and wife Jessica prior to d/c   advised pt and wife that he was accepted by [facility]  but due to staffing - 1st visit would be Mon 4/18 -  Agency will try to get pt seen sooner than this     wife will transport pt to home   discharging nurse reviewed d/c meds/instructions    Sandy Diaz MD Family Medicine [phone]     [address] [place] [address]   Next Steps: Follow up on 4/20/2022   Instructions: 9:00 am Yosvany Dominique MD Gastroenterology [phone]   [address] [place] [address]   Next Steps: Follow up on 4/27/2022   Instructions: 10:00 am Reshma Jones MD Interventional Cardiology [phone]     [address] [place] [address]   Next Steps: Follow up on 4/28/2022   Instructions: 9:20 am -[facility]   [phone]   [address] [facility] [place] [address]   Next Steps: Follow up   Instructions: Home Health - start of care Mon., 4/18 - due to staffing Jana Marrero MD Nephrology [phone]       [address] [facility] [place] [address]   Next Steps: Follow up on 4/28/2022   Instructions: 11:00 am       04/13/22 1854   Final Note   Assessment Type Final Discharge Note   Anticipated Discharge Disposition Home-Health   What phone number can be called within the next 1-3 days to see how you are doing after discharge? [phone]   Hospital Resources/Appts/Education Provided Appointments scheduled and added to AVS;Post-Acute resouces added to AVS   Post-Acute Status   Post-Acute Authorization Home Health   Home Health Status Set-up Complete/Auth obtained   Discharge Delays None known at this time

## 2022-04-13 NOTE — PT/OT/SLP PROGRESS
Physical Therapy Treatment    Patient Name:  Rex Song   MRN:  [id number]    Recommendations:     Discharge Recommendations:  home health PT   Discharge Equipment Recommendations: shower chair   Barriers to discharge: decreased mobility,strength and endurance    Assessment:     Rex Song is a 77 y.o. male admitted with a medical diagnosis of Gastrointestinal hemorrhage.  He presents with the following impairments/functional limitations:  weakness, impaired endurance, impaired functional mobilty, gait instability, impaired balance, decreased lower extremity function, decreased safety awareness, decreased ROM, impaired coordination, impaired joint extensibility,pt with improving status and will benefit from HH services upon discharge to address above functional limitations.    Rehab Prognosis: Good; patient would benefit from acute skilled PT services to address these deficits and reach maximum level of function.    Recent Surgery: Procedure(s) (LRB):  COLONOSCOPY (N/A) 1 Day Post-Op    Plan:     During this hospitalization, patient to be seen 3 x/week to address the identified rehab impairments via gait training, therapeutic activities, therapeutic exercises, neuromuscular re-education and progress toward the following goals:    · Plan of Care Expires:  05/11/22    Subjective     Chief Complaint: n/a  Patient/Family Comments/goals: pt states he was really sick.  Pain/Comfort:  · Pain Rating 1: 0/10      Objective:     Communicated with nsg prior to session.  Patient found supine with bed alarm, peripheral IV, telemetry upon PT entry to room.     General Precautions: Standard, fall   Orthopedic Precautions:N/A   Braces: N/A  Respiratory Status: Room air     Functional Mobility:  · Bed Mobility:     · Supine to Sit: modified independence  · Transfers:     · Sit to Stand:  supervision with rolling walker  · Bed to Chair: stand by assistance with  rolling walker  using  ambulation  · Gait: amb ~40' with RW  and SBA  · Balance: fair standing balance with RW      AM-PAC 6 CLICK MOBILITY  Turning over in bed (including adjusting bedclothes, sheets and blankets)?: 3  Sitting down on and standing up from a chair with arms (e.g., wheelchair, bedside commode, etc.): 3  Moving from lying on back to sitting on the side of the bed?: 3  Moving to and from a bed to a chair (including a wheelchair)?: 3  Need to walk in hospital room?: 3  Climbing 3-5 steps with a railing?: 2  Basic Mobility Total Score: 17       Therapeutic Activities and Exercises: le seated ex's X 10-12 reps inc: ap,laq and hip flex,reviewed pt safety.       Patient left up in chair with all lines intact, call button in reach, chair alarm on, nsg notified and spouse present..    GOALS: see general POC  Multidisciplinary Problems     Physical Therapy Goals        Problem: Physical Therapy    Goal Priority Disciplines Outcome Goal Variances Interventions   Physical Therapy Goal     PT, PT/OT Ongoing, Progressing     Description: Goals to be met by: 22     Patient will increase functional independence with mobility by performin. Supine to sit with Modified [place]  2. Sit to stand transfer with Modified [place]  3. Bed to chair transfer with Modified [place] using Rolling Walker  4. Gait  x 50 feet with Modified [place] and Supervision using Rolling Walker.   5. Lower extremity exercise program x10 reps per handout, with independence                     Time Tracking:     PT Received On: 22  PT Start Time: 827     PT Stop Time: 852  PT Total Time (min): 25 min     Billable Minutes: Gait Training 14 and Therapeutic Exercise 11    Treatment Type: Treatment  PT/PTA: PTA     PTA Visit Number: 1     2022

## 2022-04-13 NOTE — PROGRESS NOTES
\[facility]\"" Hospital Medicine Progress Note    Primary Team: \[facility]\"" Hospitalist Team A  Attending Physician: Dr. Briceno  Resident: Dr. Burnett  Intern: Dr. Srivastava    Subjective:   NAEON.  Pt feeling good this morning ready to go home.       - Follow up with GI as outpatient for video capsule endoscopy and repeat colonoscopy for polyp removal  - Resumed plavix on discharge as Hgb stable, but switched xarelto --> eliquis for lower risk of bleeding  - will Continue to trend Cr as outpatient   - will repeat thyroid studies in 6 weeks  Objective:     Last 24 Hour Vital Signs:  BP  Min: 101/41  Max: 136/60  Temp  Av.7 °F (37.1 °C)  Min: 98.1 °F (36.7 °C)  Max: 99 °F (37.2 °C)  Pulse  Av.2  Min: 61  Max: 128  Resp  Av.5  Min: 16  Max: 18  SpO2  Av %  Min: 91 %  Max: 100 %  Weight  Av.7 kg (140 lb 6.9 oz)  Min: 63.7 kg (140 lb 6.9 oz)  Max: 63.7 kg (140 lb 6.9 oz)  I/O last 3 completed shifts:  In: -   Out: 950 [Urine:950]    Physical Examination:  General: no acute distress, alert  HEENT: normocephalic, atraumatic, EOMI, pale conjunctiva, moist mucous membrane  Neck: midline trachea, full ROM  Card: normal rate and rhythm, no murmur, rubs, gallops appreciated  Pulm: no respiratory distress, clear to auscultation b/l  Ab: +BS, soft, nondistended, nontender to palpation  Ext: no peripheral edema  Skin: extremities cool to touch, dry  Neuro: alert and orientedx3    Laboratory:  Laboratory Data Reviewed: yes  Pertinent Findings:  H/H: stable  Plt: stable    Iron: 10  TIBC: 334  %sat: 3%  Transferrin: 226  Ferritin 10    BUN/Cr: improving    Trop: 0.037 --> 0.041    Lipid panel cholesterol: 98  HDL 29  LDL 43.6    A1c 5.2%  TSH: 23.8  T4: 0.47    Microbiology Data Reviewed: yes  Pertinent Findings:  None    Other Results:  EKG (my interpretation): Normal sinus, incomplete LBBB, prolonged QTc 505, ?ST depression in lateral leads that is not present in prior but limited due to artifact.    Radiology Data  Reviewed: yes  Pertinent Findings:  CXR (4/10/22): Acute cardiopulmonary disease    Current Medications:     Infusions:       Scheduled:   amiodarone  100 mg Oral Daily    atorvastatin  80 mg Oral Daily    ferrous sulfate  1 tablet Oral Daily    levothyroxine  25 mcg Oral Before breakfast    mupirocin   Nasal BID    pantoprazole  40 mg Intravenous BID        PRN:  sodium chloride, acetaminophen, sodium chloride 0.9%    Assessment:     Rex Song is a 77 y.o. male with HFrEF (45% 4/2022), HTN, HLD/PVD, CAD s/p PCI LCX and PDA (2000), unspecified afib, AAA (2.4 cm 2022, stable from 2019), CKD4, MS admitted for symptomatic anemia in setting of GI bleed with anticoag/antiplatelet.    Patient pending colonoscopy today     Plan:     Patient admitted for symptomatic anemia 2/2 melena from anticoag/antiplatelet use.      GI bleed  Iron deficient anemia  - In setting of plavix, xarelto. Held while inpatient. No prior history of GIB.   - Iron panel consistent with acute blood loss.   - Started oral iron supplements  - Recommend IV iron as outpatient  - S/p IVF bolus while awaiting blood product. S/p 3U pRBC with appropriate hgb response. Stable Hgb x3 days  - Hgb goal >8 given h/o CAD  - IV PPI bid while inpatient --> May resume home omeprazole for GERD as no bleeding identified on EGD  - EGD and colonoscopy without source of bleed identified  - Follow up with GI as outpatient for video capsule endoscopy and repeat colonoscopy for polyp removal  - Resumed plavix on discharge as Hgb stable, but switched xarelto --> eliquis for lower risk of bleeding     Hypotension, resolved  HTN  - Likely hypovolemic shock from GIB with inappropriate HR response due to beta blocker  - Hold antihypertensives in setting of hypotension  - Restart on discharge  - S/p resuscitation as above     Acute kidney injury on CKD4, improving  - Baseline BUN/Cr: 15-19/1.7-1.9  - BUN/Cr on admit: 51/3.4  - Likely in setting of hypovolemia,  improving with pRBC  - If no improvement, obtain urine studies  - Continue to downtrend while inpatient  - Continue to trend Cr as outpatient to confirm return to baseline     Abnormal EKG  - EKG: Normal sinus, incomplete LBBB, prolonged QTc 505, ?ST depression in lateral leads that is not present in prior but limited due to artifact.   - Trop: 0.037, stable  - Likely in setting of RANDY on CKD with demand ischemia in setting of severe anemia     Hypothyroidism, new diagnosis  - TSH 23  T4 0.47  - Started synthroid  - Repeat thyroid studies in 6 weeks (~5/16) as outpatient with PCP     HFrEF (TTE 45% 4/2022)  - Home chlorthalidone, irbesartan, toprol XR held in setting of hypotension. Resume once normotensive  - Baseline CXR without evidence of pulmonary edema  - No respiratory distress with transfusion     CAD s/p PCI LCX and PDA (2000),  - Wife reports ASA discontinued and has only been on plavix   - Unclear if patient had PFA-100 assay done  - Will defer to cardiologist regarding Plavix vs ASA upon discharge     Unspecified afib  - Home med: amio, toprol, xarelto  - Held toprol, xarelto while hypotensive inpatient  - Anticoag as above  - Resume toprol upon discharge  - EKG with normal sinus     HLD  - Lipid panel cholesterol: 98  HDL 29  LDL 43.6    - Continue home high intensity statin     Tobacco use  - Reports having quit 7 days ago  - Encouraged cessation counseling  - Nicotine patch     MS  - No acute issues      HCM  - A1c: 5.2%   Social: Update family prn     Code: Full  Dispo: discharge     Rick Srivastava MD  PGY1    [facility] Hospitalist Pager numbers:   [facility] Hospitalist Medicine Team A (Janak/Kimberly): [phone]  [facility] Hospitalist Medicine Team B (Esteban/Dipika):  [phone]

## 2022-04-13 NOTE — PLAN OF CARE
AAOX4. Regular diet continued. Pt denies N/V, SOB, distress, and pain. Tele monitored. Vital signs stable throughout the night. Afebrile. Medications given per MAR. Safety maintained. Bed alarm set. Call bell within reach. Patient encouraged to call for assistance. Will continue to monitor.

## 2022-04-13 NOTE — PROGRESS NOTES
Progress Note  Nephrology      Consult Requested By: Melly Briceno MD      SUBJECTIVE:     Overnight events  Patient is a 77 y.o. male     Patient Active Problem List   Diagnosis    CAD (coronary artery disease)    Post PTCA    Essential hypertension    Mixed hyperlipidemia    AAA (abdominal aortic aneurysm)    PVD (peripheral vascular disease)    Multiple sclerosis    Dysphagia    Colon cancer screening    Benign neoplasm of colon    Screening for colon cancer    CKD (chronic kidney disease) stage 4, GFR 15-29 ml/min    Tobacco abuse    Complex renal cyst    Atrial fibrillation    HFrEF (heart failure with reduced ejection fraction)    Acute renal injury    Hypothyroidism    Thrombocytopenia    GI bleed    Acute blood loss anemia    Anemia    Weakness     Past Medical History:   Diagnosis Date    Bilateral renal cysts     CHF (congestive heart failure)     CKD (chronic kidney disease) stage 4, GFR 15-29 ml/min     Hypertension     Iron deficiency anemia     Personal history of colonic polyps 01/01/2005    PVD (peripheral vascular disease)               OBJECTIVE:     Vitals:    04/13/22 0448 04/13/22 0739 04/13/22 1208 04/13/22 1253   BP: 112/88 (!) 107/56 124/79    BP Location:       Patient Position: Lying Lying Sitting    Pulse: 69 70 95    Resp: 18 18 18    Temp: 98.9 °F (37.2 °C) 98.4 °F (36.9 °C) 98.2 °F (36.8 °C)    TempSrc: Oral Oral Oral    SpO2: (!) 94% 95% (!) 94% (!) 93%   Weight:       Height:           Temp: 98.2 °F (36.8 °C) (04/13/22 1208)  Pulse: 95 (04/13/22 1208)  Resp: 18 (04/13/22 1208)  BP: 124/79 (04/13/22 1208)  SpO2: (!) 93 % (04/13/22 1253)    Date 04/13/22 0700 - 04/14/22 0659   Shift 5531-5561 6132-8248 0339-5343 24 Hour Total   INTAKE   Shift Total(mL/kg)       OUTPUT   Urine(mL/kg/hr) 1160(2.3)   1160   Shift Total(mL/kg) 1160(18.2)   1160(18.2)   Weight (kg) 63.7 63.7 63.7 63.7             Medications:   amiodarone  100 mg Oral Daily    atorvastatin   80 mg Oral Daily    ferrous sulfate  1 tablet Oral Daily    levothyroxine  25 mcg Oral Before breakfast    mupirocin   Nasal BID    pantoprazole  40 mg Intravenous BID                 Physical Exam:  General appearance:NAD  No SOB  Lungs: diminished breath sounds  Heart: Pulse 95  Abdomen:soft   Extremities: no edema  Skin: dry  Laboratory:  ABG  Labs reviewed  No results found for this or any previous visit (from the past 336 hour(s)).  Recent Results (from the past 336 hour(s))   CBC auto differential    Collection Time: 04/13/22  4:36 AM   Result Value Ref Range    WBC 4.99 3.90 - 12.70 K/uL    Hemoglobin 8.8 (L) 14.0 - 18.0 g/dL    Hematocrit 28.5 (L) 40.0 - 54.0 %    Platelets 109 (L) 150 - 450 K/uL   CBC auto differential    Collection Time: 04/12/22  4:38 AM   Result Value Ref Range    WBC 7.21 3.90 - 12.70 K/uL    Hemoglobin 8.9 (L) 14.0 - 18.0 g/dL    Hematocrit 28.1 (L) 40.0 - 54.0 %    Platelets 128 (L) 150 - 450 K/uL   CBC auto differential    Collection Time: 04/11/22  8:11 AM   Result Value Ref Range    WBC 6.88 3.90 - 12.70 K/uL    Hemoglobin 8.4 (L) 14.0 - 18.0 g/dL    Hematocrit 26.6 (L) 40.0 - 54.0 %    Platelets 120 (L) 150 - 450 K/uL     Urinalysis  No results for input(s): COLORU, CLARITYU, SPECGRAV, PHUR, PROTEINUA, GLUCOSEU, BILIRUBINCON, BLOODU, WBCU, RBCU, BACTERIA, MUCUS, NITRITE, LEUKOCYTESUR, UROBILINOGEN, HYALINECASTS in the last 24 hours.    Diagnostic Results:  X-Ray: Reviewed  US: Reviewed  Echo: Reviewed  ACCESS    ASSESSMENT/PLAN:     RANDY/ CKD 4  UO 1200 cc  Creatinine 2.2  GFR 25 cc/min  Azotemia  BUN 25  Metabolic bone disease  Poor nutrition  Albumin 2.8     Anemia  Hb 8.8  Iron 10  Venofer     Hypothyroid     Echo 2022  · The left ventricle is mildly enlarged with eccentric hypertrophy and mildly decreased systolic function.  · The estimated ejection fraction is 45%.  · Normal right ventricular size with normal right ventricular systolic function.  · Severe left atrial  enlargement.  · Grade I left ventricular diastolic dysfunction.  · Normal central venous pressure (3 mmHg).  · The estimated PA systolic pressure is 33 mmHg.         /79     Weight daily  I and O  Avoid hypotension, hypovolemia, nephrotoxic agents

## 2022-04-13 NOTE — PLAN OF CARE
Chart reviewed   1st day post ICU   per intial assessment:  pt lives in [place] with his wife Jessica Song [phone],  independent with his adl's and uses a w/c and r walker at home. The pt doesn't drive so his wife transports him to dr nur's and errands. She will transport the pt home at d/c.    dx:  GIB - for colonoscopy today  Nephrology following pt   therapy recc. pending as pt has been off the floor for procedures.   DC needs TBD    Future Appointments   Date Time Provider Department Center   4/20/2022  9:00 AM Sandy Diaz MD [facility] FAM MED [place] [facility]   4/28/2022  9:20 AM Reshma Jones MD [facility] CARDIO [place] [facility]              04/12/22 1935   Discharge Reassessment   Assessment Type Discharge Planning Brief Assessment   Did the patient's condition or plan change since previous assessment? Yes  (1st day post ICU)

## 2022-04-13 NOTE — PLAN OF CARE
Problem: Physical Therapy  Goal: Physical Therapy Goal  Description: Goals to be met by: 22     Patient will increase functional independence with mobility by performin. Supine to sit with Modified [place]  2. Sit to stand transfer with Modified [place]  3. Bed to chair transfer with Modified [place] using Rolling Walker  4. Gait  x 50 feet with Modified [place] and Supervision using Rolling Walker.   5. Lower extremity exercise program x10 reps per handout, with independence    Outcome: Ongoing, Progressing

## 2022-04-13 NOTE — PROGRESS NOTES
Sandy Diaz MD Family Medicine [phone]     [address] [place] [address]   Next Steps: Follow up on 4/20/2022   Instructions: 9:00 am Yosvany Dominique MD Gastroenterology [phone]   [address] [place] [address]   Next Steps: Follow up on 4/27/2022   Instructions: 10:00 am Reshma Jones MD Interventional Cardiology [phone]     [address] [place] [address]   Next Steps: Follow up on 4/28/2022   Instructions: 9:20 am -[place] - [facility]   [phone]   [address] [facility] [phone]   Next Steps: Follow up   Instructions: Home Health - start of care Mon., 4/18 - due to staffing Jana Marrero MD Nephrology [phone]       [address] [facility] [place] [address]

## 2022-04-13 NOTE — PROGRESS NOTES
[facility]                    Pharmacy       Discharge Medication Education    Patient ACCEPTED medication education. Pharmacy has provided education on the name, indication, and possible side effects of the medication(s) prescribed, using teach-back method.     The following medications have also been discussed, during this admission.        Medication List        START taking these medications      ELIQUIS 5 mg Tab  Generic drug: apixaban  Take 1 tablet (5 mg total) by mouth 2 (two) times daily.     ferrous sulfate 325 (65 FE) MG EC tablet  Take 1 tablet (325 mg total) by mouth once daily.     levothyroxine 25 MCG tablet  Commonly known as: SYNTHROID  Take 1 tablet (25 mcg total) by mouth before breakfast.            CHANGE how you take these medications      amLODIPine 5 MG tablet  Commonly known as: NORVASC  TAKE 1 TABLET EVERY DAY  What changed:   how much to take  how to take this  when to take this  additional instructions     chlorthalidone 25 MG Tab  Commonly known as: HYGROTEN  TAKE 1 TABLET ONE TIME DAILY  What changed:   how much to take  how to take this  when to take this            CONTINUE taking these medications      amiodarone 200 MG Tab  Commonly known as: PACERONE  Take 0.5 tablets (100 mg total) by mouth once daily.     cilostazoL 100 MG Tab  Commonly known as: PLETAL  TAKE 1 TABLET (100 MG TOTAL) BY MOUTH 2 (TWO) TIMES DAILY BEFORE MEALS.     clopidogreL 75 mg tablet  Commonly known as: PLAVIX  Take 1 tablet (75 mg total) by mouth once daily.     irbesartan 150 MG tablet  Commonly known as: AVAPRO  Take 1 tablet (150 mg total) by mouth every evening.     metoprolol succinate 25 MG 24 hr tablet  Commonly known as: TOPROL-XL  Take 1 tablet (25 mg total) by mouth once daily.     nitroGLYCERIN 0.4 MG SL tablet  Commonly known as: NITROSTAT  Place 1 tablet (0.4 mg total) under the tongue every 5 (five) minutes as needed.     omeprazole 40 MG capsule  Commonly known as:  PRILOSEC  TAKE 1 CAPSULE EVERY MORNING     rosuvastatin 40 MG Tab  Commonly known as: CRESTOR  TAKE 1 TABLET (40 MG TOTAL) BY MOUTH ONCE DAILY.            STOP taking these medications      rivaroxaban 20 mg Tab  Commonly known as: XARELTO               Where to Get Your Medications        These medications were sent to [facility] [place] [address] [place] [address]      Hours: Mon-Fri, 8a-5:30p Phone: [phone]   ELIQUIS 5 mg Tab       You can get these medications from any pharmacy    Bring a paper prescription for each of these medications  ferrous sulfate 325 (65 FE) MG EC tablet  levothyroxine 25 MCG tablet          Thank you  Sara Suh, PharmD  [phone]

## 2022-04-13 NOTE — PROGRESS NOTES
Future Appointments   Date Time Provider Department Center   4/20/2022  9:00 AM Sandy Diaz MD [facility] FAM MED [place] [facility]   4/27/2022 10:00 AM Yosvany Dominique MD [facility] TUMOR [place] [facility]   4/28/2022  9:20 AM Reshma Jones MD [facility] CARDIO [place] [facility]

## 2022-04-13 NOTE — PROGRESS NOTES
Please call patient pumping function of heart mildly reduced. Will repeat on FUP once recovered from recent admission. Will discuss further on FUP (already scheduled). Thanks

## 2022-04-13 NOTE — PLAN OF CARE
per am meeting - pt to d/c to home today   needs f/u with pcp, Cards, GI, Neph  --   Future Appointments   Date Time Provider Department Center   4/20/2022  9:00 AM Sandy Diaz MD [facility] FAM MED [place] [facility]   4/27/2022 10:00 AM Yosvany Dominique MD [facility] TUMOR [place] [facility]   4/28/2022  9:20 AM Reshma Jones MD [facility] CARDIO [place] [facility]     will need HH at d/c - CM spoke with pt's wife - no preference for agency.  OK to use [facility]   referrals sent per Gifty.         04/13/22 1049   Post-Acute Status   Post-Acute Authorization Home Health   Home Health Status Referrals Sent

## 2022-04-13 NOTE — NURSING
Discharged patient per MD orders. Patient and family verbalized understanding of discharge instructions and discharge medications. Transport requested to wheel patient to main lobby

## 2022-04-14 ENCOUNTER — TELEPHONE (OUTPATIENT)
Dept: HEMATOLOGY/ONCOLOGY | Facility: CLINIC | Age: 77
End: 2022-04-14
Payer: MEDICARE

## 2022-04-14 DIAGNOSIS — I73.9 PVD (PERIPHERAL VASCULAR DISEASE): Chronic | ICD-10-CM

## 2022-04-14 LAB
BLD PROD TYP BPU: NORMAL
BLD PROD TYP BPU: NORMAL
BLOOD UNIT EXPIRATION DATE: NORMAL
BLOOD UNIT EXPIRATION DATE: NORMAL
BLOOD UNIT TYPE CODE: 6200
BLOOD UNIT TYPE CODE: 6200
BLOOD UNIT TYPE: NORMAL
BLOOD UNIT TYPE: NORMAL
CODING SYSTEM: NORMAL
CODING SYSTEM: NORMAL
DISPENSE STATUS: NORMAL
DISPENSE STATUS: NORMAL
TRANS ERYTHROCYTES VOL PATIENT: NORMAL ML
TRANS ERYTHROCYTES VOL PATIENT: NORMAL ML

## 2022-04-14 NOTE — TELEPHONE ENCOUNTER
Confirmed upcoming appointments with the patient's wife  4/20 labs in [place]  4/18 visit with Dr. Nunez at 1:40pm

## 2022-04-14 NOTE — TELEPHONE ENCOUNTER
----- Message from Nat Baie sent at 4/14/2022 11:43 AM CDT -----  Contact: [phone]  Type:  RX Refill Request    Who Called: PT wife  Refill or New Rx: Refill  RX Name and Strength: cilostazol (PLETAL) 100 MG Tab  How is the patient currently taking it? (ex. 1XDay): TAKE 1 TABLET (100 MG TOTAL) BY MOUTH 2 (TWO) TIMES DAILY BEFORE MEALS.  Is this a 30 day or 90 day RX: 30  Preferred Pharmacy with phone number: [id number] [place] [address] [facility]  Local or Mail Order: local  Ordering Provider: Dr. Jones  Would the patient rather a call back or a response via [facility]? No Call back  Best Call Back Number: [phone]  Additional Information: n/a

## 2022-04-14 NOTE — TELEPHONE ENCOUNTER
Called pt back in regards to this message.     Pt wife stated he is doing well    Routed refill request to Dr. Jones to sign and send to preferred pharmacy    Mailed follow up appointment reminder to pt    ND

## 2022-04-15 ENCOUNTER — NURSE TRIAGE (OUTPATIENT)
Dept: ADMINISTRATIVE | Facility: CLINIC | Age: 77
End: 2022-04-15
Payer: MEDICARE

## 2022-04-15 RX ORDER — CILOSTAZOL 100 MG/1
100 TABLET ORAL
Qty: 60 TABLET | Refills: 11 | OUTPATIENT
Start: 2022-04-15 | End: 2023-04-15

## 2022-04-15 NOTE — TELEPHONE ENCOUNTER
Wife called on behalf of pt. States pt was recently d/c from hospital. Pt is out of Pletal 100 mg bid before meals. States she called provider yesterday and did not get refill as was requested. Advised would reach out to provider. Pharmacy verified. Advised would call back shortly.     Dr. Garcia called - conference in with pt  Wife. Pt advised per Dr. Garcia that the medication has been discontinued due to bleeding issues, therefore no refill at this time.    Reason for Disposition   [1] Prescription refill request for ESSENTIAL medicine (i.e., likelihood of harm to patient if not taken) AND [2] triager unable to refill per department policy    Protocols used: MEDICATION REFILL AND RENEWAL CALL-A-

## 2022-04-20 ENCOUNTER — OFFICE VISIT (OUTPATIENT)
Dept: HEMATOLOGY/ONCOLOGY | Facility: CLINIC | Age: 77
End: 2022-04-20
Payer: MEDICARE

## 2022-04-20 ENCOUNTER — OFFICE VISIT (OUTPATIENT)
Dept: FAMILY MEDICINE | Facility: CLINIC | Age: 77
End: 2022-04-20
Payer: MEDICARE

## 2022-04-20 VITALS
DIASTOLIC BLOOD PRESSURE: 60 MMHG | OXYGEN SATURATION: 99 % | HEART RATE: 90 BPM | HEIGHT: 60 IN | WEIGHT: 135.38 LBS | TEMPERATURE: 98 F | SYSTOLIC BLOOD PRESSURE: 106 MMHG | BODY MASS INDEX: 26.58 KG/M2

## 2022-04-20 VITALS
RESPIRATION RATE: 18 BRPM | TEMPERATURE: 98 F | DIASTOLIC BLOOD PRESSURE: 56 MMHG | HEIGHT: 63 IN | WEIGHT: 136.25 LBS | SYSTOLIC BLOOD PRESSURE: 90 MMHG | HEART RATE: 68 BPM | BODY MASS INDEX: 24.14 KG/M2

## 2022-04-20 DIAGNOSIS — N18.4 CKD (CHRONIC KIDNEY DISEASE) STAGE 4, GFR 15-29 ML/MIN: ICD-10-CM

## 2022-04-20 DIAGNOSIS — D50.9 IRON DEFICIENCY ANEMIA, UNSPECIFIED IRON DEFICIENCY ANEMIA TYPE: ICD-10-CM

## 2022-04-20 DIAGNOSIS — Z09 HOSPITAL DISCHARGE FOLLOW-UP: Primary | ICD-10-CM

## 2022-04-20 DIAGNOSIS — D62 ACUTE BLOOD LOSS ANEMIA: ICD-10-CM

## 2022-04-20 DIAGNOSIS — D50.8 OTHER IRON DEFICIENCY ANEMIAS: ICD-10-CM

## 2022-04-20 DIAGNOSIS — E03.9 HYPOTHYROIDISM, UNSPECIFIED TYPE: ICD-10-CM

## 2022-04-20 DIAGNOSIS — E78.2 MIXED HYPERLIPIDEMIA: Chronic | ICD-10-CM

## 2022-04-20 DIAGNOSIS — I50.20 HFREF (HEART FAILURE WITH REDUCED EJECTION FRACTION): Chronic | ICD-10-CM

## 2022-04-20 DIAGNOSIS — I10 ESSENTIAL HYPERTENSION: Chronic | ICD-10-CM

## 2022-04-20 DIAGNOSIS — I48.91 ATRIAL FIBRILLATION, UNSPECIFIED TYPE: Chronic | ICD-10-CM

## 2022-04-20 DIAGNOSIS — I25.119 CORONARY ARTERY DISEASE INVOLVING NATIVE HEART WITH ANGINA PECTORIS, UNSPECIFIED VESSEL OR LESION TYPE: Chronic | ICD-10-CM

## 2022-04-20 PROCEDURE — 1126F PR PAIN SEVERITY QUANTIFIED, NO PAIN PRESENT: ICD-10-PCS | Mod: CPTII,S$GLB,, | Performed by: FAMILY MEDICINE

## 2022-04-20 PROCEDURE — 1101F PT FALLS ASSESS-DOCD LE1/YR: CPT | Mod: CPTII,S$GLB,, | Performed by: INTERNAL MEDICINE

## 2022-04-20 PROCEDURE — 3288F PR FALLS RISK ASSESSMENT DOCUMENTED: ICD-10-PCS | Mod: CPTII,S$GLB,, | Performed by: FAMILY MEDICINE

## 2022-04-20 PROCEDURE — 1101F PR PT FALLS ASSESS DOC 0-1 FALLS W/OUT INJ PAST YR: ICD-10-PCS | Mod: CPTII,S$GLB,, | Performed by: INTERNAL MEDICINE

## 2022-04-20 PROCEDURE — 1159F PR MEDICATION LIST DOCUMENTED IN MEDICAL RECORD: ICD-10-PCS | Mod: CPTII,S$GLB,, | Performed by: FAMILY MEDICINE

## 2022-04-20 PROCEDURE — 1111F DSCHRG MED/CURRENT MED MERGE: CPT | Mod: CPTII,S$GLB,, | Performed by: FAMILY MEDICINE

## 2022-04-20 PROCEDURE — 3288F PR FALLS RISK ASSESSMENT DOCUMENTED: ICD-10-PCS | Mod: CPTII,S$GLB,, | Performed by: INTERNAL MEDICINE

## 2022-04-20 PROCEDURE — 1111F PR DISCHARGE MEDS RECONCILED W/ CURRENT OUTPATIENT MED LIST: ICD-10-PCS | Mod: CPTII,S$GLB,, | Performed by: FAMILY MEDICINE

## 2022-04-20 PROCEDURE — 3074F SYST BP LT 130 MM HG: CPT | Mod: CPTII,S$GLB,, | Performed by: FAMILY MEDICINE

## 2022-04-20 PROCEDURE — 3078F PR MOST RECENT DIASTOLIC BLOOD PRESSURE < 80 MM HG: ICD-10-PCS | Mod: CPTII,S$GLB,, | Performed by: INTERNAL MEDICINE

## 2022-04-20 PROCEDURE — 1160F RVW MEDS BY RX/DR IN RCRD: CPT | Mod: CPTII,S$GLB,, | Performed by: INTERNAL MEDICINE

## 2022-04-20 PROCEDURE — 99214 OFFICE O/P EST MOD 30 MIN: CPT | Mod: S$GLB,,, | Performed by: FAMILY MEDICINE

## 2022-04-20 PROCEDURE — 1126F AMNT PAIN NOTED NONE PRSNT: CPT | Mod: CPTII,S$GLB,, | Performed by: FAMILY MEDICINE

## 2022-04-20 PROCEDURE — 3074F PR MOST RECENT SYSTOLIC BLOOD PRESSURE < 130 MM HG: ICD-10-PCS | Mod: CPTII,S$GLB,, | Performed by: INTERNAL MEDICINE

## 2022-04-20 PROCEDURE — 1159F PR MEDICATION LIST DOCUMENTED IN MEDICAL RECORD: ICD-10-PCS | Mod: CPTII,S$GLB,, | Performed by: INTERNAL MEDICINE

## 2022-04-20 PROCEDURE — 3078F DIAST BP <80 MM HG: CPT | Mod: CPTII,S$GLB,, | Performed by: INTERNAL MEDICINE

## 2022-04-20 PROCEDURE — 3288F FALL RISK ASSESSMENT DOCD: CPT | Mod: CPTII,S$GLB,, | Performed by: INTERNAL MEDICINE

## 2022-04-20 PROCEDURE — 1111F PR DISCHARGE MEDS RECONCILED W/ CURRENT OUTPATIENT MED LIST: ICD-10-PCS | Mod: CPTII,S$GLB,, | Performed by: INTERNAL MEDICINE

## 2022-04-20 PROCEDURE — 99999 PR PBB SHADOW E&M-EST. PATIENT-LVL IV: ICD-10-PCS | Mod: PBBFAC,,, | Performed by: FAMILY MEDICINE

## 2022-04-20 PROCEDURE — 99499 RISK ADDL DX/OHS AUDIT: ICD-10-PCS | Mod: HCNC,S$GLB,, | Performed by: FAMILY MEDICINE

## 2022-04-20 PROCEDURE — 3078F DIAST BP <80 MM HG: CPT | Mod: CPTII,S$GLB,, | Performed by: FAMILY MEDICINE

## 2022-04-20 PROCEDURE — 1160F PR REVIEW ALL MEDS BY PRESCRIBER/CLIN PHARMACIST DOCUMENTED: ICD-10-PCS | Mod: CPTII,S$GLB,, | Performed by: INTERNAL MEDICINE

## 2022-04-20 PROCEDURE — 1159F MED LIST DOCD IN RCRD: CPT | Mod: CPTII,S$GLB,, | Performed by: FAMILY MEDICINE

## 2022-04-20 PROCEDURE — 99999 PR PBB SHADOW E&M-EST. PATIENT-LVL IV: ICD-10-PCS | Mod: PBBFAC,,, | Performed by: INTERNAL MEDICINE

## 2022-04-20 PROCEDURE — 99205 PR OFFICE/OUTPT VISIT, NEW, LEVL V, 60-74 MIN: ICD-10-PCS | Mod: S$GLB,,, | Performed by: INTERNAL MEDICINE

## 2022-04-20 PROCEDURE — 1101F PR PT FALLS ASSESS DOC 0-1 FALLS W/OUT INJ PAST YR: ICD-10-PCS | Mod: CPTII,S$GLB,, | Performed by: FAMILY MEDICINE

## 2022-04-20 PROCEDURE — 99999 PR PBB SHADOW E&M-EST. PATIENT-LVL IV: CPT | Mod: PBBFAC,,, | Performed by: FAMILY MEDICINE

## 2022-04-20 PROCEDURE — 99499 UNLISTED E&M SERVICE: CPT | Mod: HCNC,S$GLB,, | Performed by: FAMILY MEDICINE

## 2022-04-20 PROCEDURE — 1125F PR PAIN SEVERITY QUANTIFIED, PAIN PRESENT: ICD-10-PCS | Mod: CPTII,S$GLB,, | Performed by: INTERNAL MEDICINE

## 2022-04-20 PROCEDURE — 3078F PR MOST RECENT DIASTOLIC BLOOD PRESSURE < 80 MM HG: ICD-10-PCS | Mod: CPTII,S$GLB,, | Performed by: FAMILY MEDICINE

## 2022-04-20 PROCEDURE — 1125F AMNT PAIN NOTED PAIN PRSNT: CPT | Mod: CPTII,S$GLB,, | Performed by: INTERNAL MEDICINE

## 2022-04-20 PROCEDURE — 3288F FALL RISK ASSESSMENT DOCD: CPT | Mod: CPTII,S$GLB,, | Performed by: FAMILY MEDICINE

## 2022-04-20 PROCEDURE — 3074F PR MOST RECENT SYSTOLIC BLOOD PRESSURE < 130 MM HG: ICD-10-PCS | Mod: CPTII,S$GLB,, | Performed by: FAMILY MEDICINE

## 2022-04-20 PROCEDURE — 99214 PR OFFICE/OUTPT VISIT, EST, LEVL IV, 30-39 MIN: ICD-10-PCS | Mod: S$GLB,,, | Performed by: FAMILY MEDICINE

## 2022-04-20 PROCEDURE — 99999 PR PBB SHADOW E&M-EST. PATIENT-LVL IV: CPT | Mod: PBBFAC,,, | Performed by: INTERNAL MEDICINE

## 2022-04-20 PROCEDURE — 1159F MED LIST DOCD IN RCRD: CPT | Mod: CPTII,S$GLB,, | Performed by: INTERNAL MEDICINE

## 2022-04-20 PROCEDURE — 3074F SYST BP LT 130 MM HG: CPT | Mod: CPTII,S$GLB,, | Performed by: INTERNAL MEDICINE

## 2022-04-20 PROCEDURE — 99205 OFFICE O/P NEW HI 60 MIN: CPT | Mod: S$GLB,,, | Performed by: INTERNAL MEDICINE

## 2022-04-20 PROCEDURE — 1101F PT FALLS ASSESS-DOCD LE1/YR: CPT | Mod: CPTII,S$GLB,, | Performed by: FAMILY MEDICINE

## 2022-04-20 PROCEDURE — 1111F DSCHRG MED/CURRENT MED MERGE: CPT | Mod: CPTII,S$GLB,, | Performed by: INTERNAL MEDICINE

## 2022-04-20 RX ORDER — HEPARIN 100 UNIT/ML
500 SYRINGE INTRAVENOUS
Status: CANCELLED | OUTPATIENT
Start: 2022-05-12

## 2022-04-20 RX ORDER — HEPARIN 100 UNIT/ML
500 SYRINGE INTRAVENOUS
Status: CANCELLED | OUTPATIENT
Start: 2022-05-05

## 2022-04-20 RX ORDER — SODIUM CHLORIDE 0.9 % (FLUSH) 0.9 %
10 SYRINGE (ML) INJECTION
Status: CANCELLED | OUTPATIENT
Start: 2022-05-12

## 2022-04-20 RX ORDER — HEPARIN 100 UNIT/ML
500 SYRINGE INTRAVENOUS
Status: CANCELLED | OUTPATIENT
Start: 2022-05-19

## 2022-04-20 RX ORDER — SODIUM CHLORIDE 0.9 % (FLUSH) 0.9 %
10 SYRINGE (ML) INJECTION
Status: CANCELLED | OUTPATIENT
Start: 2022-05-19

## 2022-04-20 RX ORDER — HEPARIN 100 UNIT/ML
500 SYRINGE INTRAVENOUS
Status: CANCELLED | OUTPATIENT
Start: 2022-04-20

## 2022-04-20 RX ORDER — SODIUM CHLORIDE 0.9 % (FLUSH) 0.9 %
10 SYRINGE (ML) INJECTION
Status: CANCELLED | OUTPATIENT
Start: 2022-04-20

## 2022-04-20 RX ORDER — SODIUM CHLORIDE 0.9 % (FLUSH) 0.9 %
10 SYRINGE (ML) INJECTION
Status: CANCELLED | OUTPATIENT
Start: 2022-05-05

## 2022-04-20 NOTE — PROGRESS NOTES
(Portions of this note were dictated using voice recognition software and may contain dictation related errors in spelling/grammar/syntax not found on text review)    CC:   Chief Complaint   Patient presents with    Hospital Follow Up       HPI: 77 y.o. male presented for hospital discharge follow-up visit.  He has past medical history significant for HFrEF ( EF 45 % 4/2022), CKD stage 4, hypertension, iron-deficiency anemia, PVD, atrial fibrillation, CAD s/p PCI in LCX and PDA (2000), AAA ( 2.5 cm in 2022, stable since 2019).  Patient initially presented to ER on 4/10 with weakness for 2 weeks, he was independent with ADL before and became progressively weak, also had black watery stools on the day of presentation and cannot walk due to weakness with SOB which prompted family to bring him to the ER, he was on Xarelto and Plavix.  Blood workup shows hemoglobin of 3.8, he was transfused with 3 units of blood, endoscopy and colonoscopy was performed with GI consult which did not show any cause of bleeding, he had 10 mm polyp in the splenic flexure and a tattoo in the ascending colon, he will be following up with GI for video capsule endoscopy and repeat colonoscopy, iron panel showed iron deficiency anemia, he was started on iron supplements.  Patient reports that since discharge she has been doing okay, feels weak sometimes, has been eating and drinking fine.  Xarelto was switched to Eliquis at the time of discharge.  He denies having any chest pain, shortness of breath, nausea, vomiting, abdominal pain, changes in bowel habits, urine problems including burning and dysuria, dark colored stools    Past Medical History:   Diagnosis Date    Bilateral renal cysts     CHF (congestive heart failure)     CKD (chronic kidney disease) stage 4, GFR 15-29 ml/min     Hypertension     Iron deficiency anemia     Personal history of colonic polyps 01/01/2005    PVD (peripheral vascular disease)        Past Surgical History:    Procedure Laterality Date    COLONOSCOPY N/A 4/12/2022    Procedure: COLONOSCOPY;  Surgeon: Earl Hall MD;  Location: [facility] ENDO;  Service: Endoscopy;  Laterality: N/A;    CORONARY ANGIOPLASTY WITH STENT PLACEMENT  08/18/2000    LCX stent/ PDA stent    ESOPHAGOGASTRODUODENOSCOPY N/A 4/11/2022    Procedure: EGD (ESOPHAGOGASTRODUODENOSCOPY);  Surgeon: Yosvany Dominique MD;  Location: [facility] ENDO;  Service: Endoscopy;  Laterality: N/A;       Family History   Problem Relation Age of Onset    Prostate cancer Brother 59       Social History     Tobacco Use    Smoking status: Former Smoker     Packs/day: 0.25     Types: Cigarettes    Smokeless tobacco: Never Used   Substance Use Topics    Alcohol use: No     Alcohol/week: 0.0 standard drinks       Lab Results   Component Value Date    WBC 7.35 04/18/2022    HGB 10.5 (L) 04/18/2022    HCT 35.9 (L) 04/18/2022    MCV 84 04/18/2022     04/18/2022    CHOL 98 (L) 04/11/2022    TRIG 127 04/11/2022    HDL 29 (L) 04/11/2022    ALT 20 04/18/2022    AST 27 04/18/2022    BILITOT 0.4 04/18/2022    ALKPHOS 103 04/18/2022     04/18/2022    K 4.2 04/18/2022     04/18/2022    CREATININE 2.78 (H) 04/18/2022    ESTGFRAFRICA 23.9 (A) 04/18/2022    EGFRNONAA 20.7 (A) 04/18/2022    CALCIUM 8.8 04/18/2022    ALBUMIN 3.7 04/18/2022    BUN 28 (H) 04/18/2022    CO2 27 04/18/2022    TSH 23.839 (H) 04/11/2022    INR 1.0 02/20/2005    HGBA1C 5.2 04/11/2022    LDLCALC 43.6 (L) 04/11/2022     (H) 04/18/2022    RXZTTCRZ87CM 36 01/31/2018             Vital signs reviewed  PE:   APPEARANCE: Well nourished, well developed, in no acute distress.    HEAD: Normocephalic, atraumatic.  EYES: EOMI.  Conjunctivae noninjected.  NOSE: Mucosa pink. Airway clear.  MOUTH & THROAT: No tonsillar enlargement. No pharyngeal erythema or exudate.   NECK: Supple with no cervical lymphadenopathy.    CHEST: Good inspiratory effort. Lungs clear to auscultation with no wheezes or  crackles.  CARDIOVASCULAR: Normal S1, S2. No rubs, murmurs, or gallops.  ABDOMEN: Bowel sounds normal. Not distended. Soft. No tenderness or masses. No organomegaly.  EXTREMITIES: No edema, cyanosis, or clubbing.    Review of Systems   Constitutional: Negative for chills, fatigue and fever.   HENT: Negative.    Respiratory: Negative for cough, shortness of breath and wheezing.    Cardiovascular: Negative for chest pain, palpitations and leg swelling.   Gastrointestinal: Negative for abdominal pain, change in bowel habit, constipation, diarrhea, nausea, vomiting and change in bowel habit.   Genitourinary: Negative.    Musculoskeletal: Negative.    Neurological: Negative.    Psychiatric/Behavioral: Negative.    All other systems reviewed and are negative.      IMPRESSION  1. Hospital discharge follow-up    2. Iron deficiency anemia, unspecified iron deficiency anemia type    3. CKD (chronic kidney disease) stage 4, GFR 15-29 ml/min    4. Essential hypertension    5. Mixed hyperlipidemia    6. Atrial fibrillation, unspecified type    7. HFrEF (heart failure with reduced ejection fraction)    8. Coronary artery disease involving native heart with angina pectoris, unspecified vessel or lesion type    9. Hypothyroidism, unspecified type            PLAN      1. Iron deficiency anemia, unspecified iron deficiency anemia type  Colonoscopy reviewed- 0 mm polyp at splenic flexure and a tattoo in ascending colon    CBC from 4/18 reviewed- stable  Continue iron supplements  - CBC Auto Differential; Future    Upcoming appointment with neuroendocrine on 04/27 for possible video capsule endoscopy      2. CKD (chronic kidney disease) stage 4, GFR 15-29 ml/min  Keep upcoming appointment with Nephrology on 04/28    - Comprehensive Metabolic Panel; Future  Avoid nephrotoxic agents like NSAIDs, Fleet enemas      3. Hospital discharge follow-up      4. Essential hypertension  Stable  Continue Toprol-XL, chlorthalidone amlodipine and  irbesartan      5. Mixed hyperlipidemia  Continue Crestor      6. Atrial fibrillation, unspecified type  Continue amiodarone and Eliquis        7. HFrEF (heart failure with reduced ejection fraction)  Echo from 04/05/2022 reviewed-shows EF of 45% with mildly decreased systolic function and eccentric hypertrophy, severe left atrial enlargement  - Keep upcoming appointment with Cardiology on 04/28      8. Coronary artery disease involving native heart with angina pectoris, unspecified vessel or lesion type  Continue Eliquis and Plavix  - Keep upcoming appointment with Cardiology on 04/28      9. Hypothyroidism, unspecified type  Continue Synthroid 25 mcg         SCREENINGS      Immunizations:   Up-to-date with COVID vaccine, needs flu, shingles and tetanus      Age/demographic appropriate health maintenance:  Done        Transitional Care Note    Family and/or Caretaker present at visit?  Yes.wife  Diagnostic tests reviewed/disposition: I have reviewed all completed as well as pending diagnostic tests at the time of discharge.  Disease/illness education: yes  Home health/community services discussion/referrals: Patient does not have home health established from hospital visit.  They do not need home health.  If needed, we will set up home health for the patient.   Establishment or re-establishment of referral orders for community resources: No other necessary community resources.   Discussion with other health care providers: No discussion with other health care providers necessary.              Sandy Diaz   4/20/2022

## 2022-04-20 NOTE — PROGRESS NOTES
"PATIENT: Rex Song  MRN: [id number]  DATE: 4/20/2022    Chief Complaint:  Iron deficiency anemia    Subjective:     History of Present Illness:     Presented to the hospital 04/10/2022 with severe anemia, hemoglobin 3.8.  Was found to have dark stools.    Colonoscopy April 12, 2022 showed a 10 mm polyp in the splenic flexure otherwise unremarkable colonoscopy.  Video capsule endoscopy recommended.    Upper endoscopy April 11, 2022 showed a low-grade narrowing Schatzki ring in the lower 3rd of the esophagus, no source of bleeding noted otherwise.    Seeing Dr. Dominique for follow-up on 04/27/2022     Past Medical, Surgical, Family and Social History Reviewed.    Medications and Allergies reviewed    Review of Systems   Constitutional: Positive for fatigue. Negative for fever and unexpected weight change.       Objective:     Vitals:    04/20/22 1341   BP: (!) 90/56   BP Location: Right arm   Patient Position: Sitting   BP Method: Medium (Automatic)   Pulse: 68   Resp: 18   Temp: 97.8 °F (36.6 °C)   TempSrc: Oral   Weight: 61.8 kg (136 lb 3.9 oz)   Height: 5' 3" (1.6 m)       BMI: Body mass index is 24.13 kg/m².    Physical Exam  Vitals and nursing note reviewed.   Constitutional:       General: He is not in acute distress.  Pulmonary:      Effort: Pulmonary effort is normal.      Breath sounds: Normal breath sounds.   Neurological:      Mental Status: He is alert. Mental status is at baseline.         Assessment:       1. Acute blood loss anemia    2. Other iron deficiency anemias        Plan:   Acute blood loss anemia  -reviewed CBC, CMP, ferritin, iron saturation  -severe iron deficiency anemia secondary to blood loss noted  -EGD and colonoscopy unremarkable  -source of blood loss unclear, will need video capsule endoscopy, will be seeing Dr. Dominique next week  -will replete iron stores, scheduled for Venofer weekly x4 doses  -check CBC, CMP, ferritin, iron saturation in 2 months, sooner if needed              "

## 2022-04-21 ENCOUNTER — TELEPHONE (OUTPATIENT)
Dept: GASTROENTEROLOGY | Facility: CLINIC | Age: 77
End: 2022-04-21
Payer: MEDICARE

## 2022-04-21 NOTE — TELEPHONE ENCOUNTER
----- Message from Barb Gonzales sent at 4/21/2022  9:07 AM CDT -----  Regarding: RE: Hosp f/u  Okay. Thank you.  ----- Message -----  From: Mita Garduno MA  Sent: 4/21/2022   9:00 AM CDT  To: Barb Gonzales  Subject: FW: Hosp f/u                                     MD says that patient just needs a repeat colonoscopy. I will contact him to schedule. Thanks           ----- Message -----  From: Benson Mandujano MD  Sent: 4/20/2022   7:10 AM CDT  To: Mita Garduno MA  Subject: RE: Hosp f/u                                     He needs to have his colonoscopy repeated  ----- Message -----  From: Mita Garduno MA  Sent: 4/12/2022   1:14 PM CDT  To: Benson Mandujano MD  Subject: FW: Hosp f/u                                     Did you want to see him for a follow up?        ----- Message -----  From: Barb Gonzales  Sent: 4/12/2022  12:12 PM CDT  To: [facility] Know[facility] Staff  Subject: Hosp f/u                                         Patient is preparing for discharge from [facility] and is requiring a hospital follow up  appointment with Dr. Mandujano within 14 days. I'm unable to schedule an appointment within that time frame. If possible, can you please assist with scheduling this patient and message me back? Pt also had a Endoscopy procedure preformed by Dr. Mandujano.      DX CAD (coronary artery disease)    Thank you,     Barb Gonzales  Access Navigator/ [facility] [facility]

## 2022-04-27 ENCOUNTER — OFFICE VISIT (OUTPATIENT)
Dept: NEUROLOGY | Facility: HOSPITAL | Age: 77
End: 2022-04-27
Attending: INTERNAL MEDICINE
Payer: MEDICARE

## 2022-04-27 VITALS
BODY MASS INDEX: 24.3 KG/M2 | DIASTOLIC BLOOD PRESSURE: 56 MMHG | WEIGHT: 137.13 LBS | HEART RATE: 56 BPM | TEMPERATURE: 98 F | HEIGHT: 63 IN | SYSTOLIC BLOOD PRESSURE: 90 MMHG

## 2022-04-27 DIAGNOSIS — K63.5 POLYP OF COLON, UNSPECIFIED PART OF COLON, UNSPECIFIED TYPE: Primary | ICD-10-CM

## 2022-04-27 DIAGNOSIS — K92.2 GASTROINTESTINAL HEMORRHAGE, UNSPECIFIED GASTROINTESTINAL HEMORRHAGE TYPE: ICD-10-CM

## 2022-04-27 PROCEDURE — 99215 OFFICE O/P EST HI 40 MIN: CPT | Performed by: INTERNAL MEDICINE

## 2022-04-27 RX ORDER — SODIUM, POTASSIUM,MAG SULFATES 17.5-3.13G
2 SOLUTION, RECONSTITUTED, ORAL ORAL ONCE
Qty: 1 KIT | Refills: 0 | Status: SHIPPED | OUTPATIENT
Start: 2022-04-27 | End: 2022-04-27

## 2022-04-27 NOTE — PROGRESS NOTES
"[facility]    CC: Iron deficiency anemia     HPI 77 y.o. male recently admitted to the hospital with melena and severe iron deficiency anemia requiring blood transfusion. Evaluated with EGD/colonosocpy in the hospital. No bleeding source identified. Found to have 2 colon polyps, not removed at the time. He has never had an episode like this before. He denies ongoing bleeding at this time.     Past Medical History  Past Medical History:   Diagnosis Date    Bilateral renal cysts     CHF (congestive heart failure)     CKD (chronic kidney disease) stage 4, GFR 15-29 ml/min     Hypertension     Iron deficiency anemia     Personal history of colonic polyps 01/01/2005    PVD (peripheral vascular disease)      Physical Examination  BP (!) 90/56   Pulse (!) 56   Temp 98 °F (36.7 °C)   Ht 5' 3" (1.6 m)   Wt 62.2 kg (137 lb 2 oz)   BMI 24.29 kg/m²   General appearance: alert, cooperative, no distress  Lungs: normal work of breathing, no dullness to percussion bilaterally  Heart: regular rate and rhythm without rub; no displacement of the PMI   Abdomen: soft, non-tender; bowel sounds normoactive; no organomegaly    Assessment:   77 y.o. male with extensive cardiac history on Plavix and Eliquis, recently admitted to the hospital with melena and severe iron deficiency anemia requiring blood transfusions. Evaluated with EGD/colonosocpy in the hospital. No bleeding source identified. Few polyps seen in the colon. Ddx including but not limited to small bowel angioectasia or dieulafoy lesion.     Plan:  - Video capsule endoscopy 5/4  - Will plan for colonoscopy after evaluation of small bowel with VCE   - Instructed patient to keep appointment for upcoming parenteral iron infusions   - Total of >30 minutes encounter time    Yosvany Dominique MD   [address], [address]   [place] [address] [phone]    "

## 2022-04-27 NOTE — PATIENT INSTRUCTIONS
Small Bowel Capsule Endoscopy Study PREP INSTRUCTIONS    [facility] [place] [address]  [phone]    PATIENT NAME:_______________PROCEDURE DAY/TIME:__Wednesday, May 4, 2022______  CLEAR LIQUID DIET after lunch the day before the procedure  Clear Liquid Diet means any liquid from the list below that is not red or purple in color:  Gatorade, Reynaldo-Aid, Lemonade (Yellow ONLY)-Gatorade is the preferred liquid  Tea (no milk or dairy)  Carbonated beverages (soft drink), regular or diet  Apple juice, white grape juice, white cranberry juice  Jell-O (orange, lemon, or lime flavors ONLY)  Clear, fat-free, beef or chicken broth  Bouillon, clear consommé  Snowball, Popsicles (NOT red or purple)  * No Solid Food or Alcohol   BOWEL PREP INSTRUCTIONS THE DAY BEFORE THE EXAM: Tuesday, May 3, 2022  Drink only clear liquids (see the above diet) all day. Gatorade is the best liquid.   Drink an extra 8 ounces of clear liquid every hour from 11am to 5pm.         Nothing to eat or drink after midnight.  THE DAY OF THE EXAM:  Report to registration at 7 am.  Capsule to be given at 8am  Return to Endoscopy 8 hours following capsule ingestion.

## 2022-04-28 ENCOUNTER — OFFICE VISIT (OUTPATIENT)
Dept: CARDIOLOGY | Facility: CLINIC | Age: 77
End: 2022-04-28
Payer: MEDICARE

## 2022-04-28 VITALS
OXYGEN SATURATION: 98 % | SYSTOLIC BLOOD PRESSURE: 98 MMHG | WEIGHT: 136.69 LBS | HEART RATE: 109 BPM | BODY MASS INDEX: 24.21 KG/M2 | DIASTOLIC BLOOD PRESSURE: 56 MMHG

## 2022-04-28 DIAGNOSIS — E78.5 HYPERLIPIDEMIA, UNSPECIFIED HYPERLIPIDEMIA TYPE: ICD-10-CM

## 2022-04-28 DIAGNOSIS — I10 ESSENTIAL HYPERTENSION: ICD-10-CM

## 2022-04-28 DIAGNOSIS — I25.10 CORONARY ARTERY DISEASE INVOLVING NATIVE CORONARY ARTERY WITHOUT ANGINA PECTORIS, UNSPECIFIED WHETHER NATIVE OR TRANSPLANTED HEART: Primary | ICD-10-CM

## 2022-04-28 DIAGNOSIS — I73.9 PAD (PERIPHERAL ARTERY DISEASE): ICD-10-CM

## 2022-04-28 DIAGNOSIS — K92.2 GASTROINTESTINAL HEMORRHAGE, UNSPECIFIED GASTROINTESTINAL HEMORRHAGE TYPE: ICD-10-CM

## 2022-04-28 DIAGNOSIS — I71.40 ABDOMINAL AORTIC ANEURYSM (AAA) WITHOUT RUPTURE: ICD-10-CM

## 2022-04-28 DIAGNOSIS — I48.91 ATRIAL FIBRILLATION, UNSPECIFIED TYPE: ICD-10-CM

## 2022-04-28 PROCEDURE — 1126F PR PAIN SEVERITY QUANTIFIED, NO PAIN PRESENT: ICD-10-PCS | Mod: CPTII,S$GLB,, | Performed by: INTERNAL MEDICINE

## 2022-04-28 PROCEDURE — 1159F MED LIST DOCD IN RCRD: CPT | Mod: CPTII,S$GLB,, | Performed by: INTERNAL MEDICINE

## 2022-04-28 PROCEDURE — 3078F PR MOST RECENT DIASTOLIC BLOOD PRESSURE < 80 MM HG: ICD-10-PCS | Mod: CPTII,S$GLB,, | Performed by: INTERNAL MEDICINE

## 2022-04-28 PROCEDURE — 99215 OFFICE O/P EST HI 40 MIN: CPT | Mod: 25,S$GLB,, | Performed by: INTERNAL MEDICINE

## 2022-04-28 PROCEDURE — 1160F PR REVIEW ALL MEDS BY PRESCRIBER/CLIN PHARMACIST DOCUMENTED: ICD-10-PCS | Mod: CPTII,S$GLB,, | Performed by: INTERNAL MEDICINE

## 2022-04-28 PROCEDURE — 1160F RVW MEDS BY RX/DR IN RCRD: CPT | Mod: CPTII,S$GLB,, | Performed by: INTERNAL MEDICINE

## 2022-04-28 PROCEDURE — 99499 RISK ADDL DX/OHS AUDIT: ICD-10-PCS | Mod: S$GLB,,, | Performed by: INTERNAL MEDICINE

## 2022-04-28 PROCEDURE — 1111F PR DISCHARGE MEDS RECONCILED W/ CURRENT OUTPATIENT MED LIST: ICD-10-PCS | Mod: CPTII,S$GLB,, | Performed by: INTERNAL MEDICINE

## 2022-04-28 PROCEDURE — 99999 PR PBB SHADOW E&M-EST. PATIENT-LVL III: CPT | Mod: PBBFAC,,, | Performed by: INTERNAL MEDICINE

## 2022-04-28 PROCEDURE — 1157F PR ADVANCE CARE PLAN OR EQUIV PRESENT IN MEDICAL RECORD: ICD-10-PCS | Mod: CPTII,S$GLB,, | Performed by: INTERNAL MEDICINE

## 2022-04-28 PROCEDURE — 93000 ELECTROCARDIOGRAM COMPLETE: CPT | Mod: S$GLB,,, | Performed by: INTERNAL MEDICINE

## 2022-04-28 PROCEDURE — 99499 UNLISTED E&M SERVICE: CPT | Mod: S$GLB,,, | Performed by: INTERNAL MEDICINE

## 2022-04-28 PROCEDURE — 1157F ADVNC CARE PLAN IN RCRD: CPT | Mod: CPTII,S$GLB,, | Performed by: INTERNAL MEDICINE

## 2022-04-28 PROCEDURE — 93000 EKG 12-LEAD: ICD-10-PCS | Mod: S$GLB,,, | Performed by: INTERNAL MEDICINE

## 2022-04-28 PROCEDURE — 1111F DSCHRG MED/CURRENT MED MERGE: CPT | Mod: CPTII,S$GLB,, | Performed by: INTERNAL MEDICINE

## 2022-04-28 PROCEDURE — 3078F DIAST BP <80 MM HG: CPT | Mod: CPTII,S$GLB,, | Performed by: INTERNAL MEDICINE

## 2022-04-28 PROCEDURE — 3074F SYST BP LT 130 MM HG: CPT | Mod: CPTII,S$GLB,, | Performed by: INTERNAL MEDICINE

## 2022-04-28 PROCEDURE — 99999 PR PBB SHADOW E&M-EST. PATIENT-LVL III: ICD-10-PCS | Mod: PBBFAC,,, | Performed by: INTERNAL MEDICINE

## 2022-04-28 PROCEDURE — 99215 PR OFFICE/OUTPT VISIT, EST, LEVL V, 40-54 MIN: ICD-10-PCS | Mod: 25,S$GLB,, | Performed by: INTERNAL MEDICINE

## 2022-04-28 PROCEDURE — 3074F PR MOST RECENT SYSTOLIC BLOOD PRESSURE < 130 MM HG: ICD-10-PCS | Mod: CPTII,S$GLB,, | Performed by: INTERNAL MEDICINE

## 2022-04-28 PROCEDURE — 1159F PR MEDICATION LIST DOCUMENTED IN MEDICAL RECORD: ICD-10-PCS | Mod: CPTII,S$GLB,, | Performed by: INTERNAL MEDICINE

## 2022-04-28 PROCEDURE — 1126F AMNT PAIN NOTED NONE PRSNT: CPT | Mod: CPTII,S$GLB,, | Performed by: INTERNAL MEDICINE

## 2022-04-28 NOTE — PROGRESS NOTES
Cardiology Clinic note    Subjective:   Patient ID:  Rex Song is a 77 y.o. male who presents for CAD, PAD FUP    HPI:   CAD: Denies CP, REBOLLEDO. Walking half a block    PAD: No pain in the legs. No claudications symptoms. No ulcers / wounds    Afib: No palpitations, irregular heart beat, syncope or near syncope    HTN: On medications    HLD: Tolerating statin    SH Tobacco quit April 2022  FH No premature CAD    Patient Active Problem List    Diagnosis Date Noted    Other iron deficiency anemias 04/20/2022    Hypothyroidism 04/11/2022    Thrombocytopenia 04/11/2022    GI bleed     Acute blood loss anemia     Anemia     Weakness     Atrial fibrillation 04/10/2022    HFrEF (heart failure with reduced ejection fraction) 04/10/2022    Acute renal injury 04/10/2022    Complex renal cyst 02/07/2018    CKD (chronic kidney disease) stage 4, GFR 15-29 ml/min 06/03/2015    Tobacco abuse 06/03/2015    Screening for colon cancer 11/12/2014    Benign neoplasm of colon 10/29/2014    Colon cancer screening 04/30/2014    Dysphagia 03/24/2014    CAD (coronary artery disease) 11/12/2012      RCA      Post PTCA 11/12/2012      LCX and PDA  stent 08/18/2000       Essential hypertension 11/12/2012    Mixed hyperlipidemia 11/12/2012    AAA (abdominal aortic aneurysm) 11/12/2012    PVD (peripheral vascular disease) 11/12/2012    Multiple sclerosis 11/12/2012       Patient's Medications   New Prescriptions    No medications on file   Previous Medications    AMIODARONE (PACERONE) 200 MG TAB    Take 0.5 tablets (100 mg total) by mouth once daily.    AMLODIPINE (NORVASC) 5 MG TABLET    TAKE 1 TABLET EVERY DAY    APIXABAN (ELIQUIS) 5 MG TAB    Take 1 tablet (5 mg total) by mouth 2 (two) times daily.    CILOSTAZOL (PLETAL) 100 MG TAB    TAKE 1 TABLET (100 MG TOTAL) BY MOUTH 2 (TWO) TIMES DAILY BEFORE MEALS.    CLOPIDOGREL (PLAVIX) 75 MG TABLET    Take 1 tablet (75 mg total) by mouth once daily.    FERROUS  SULFATE 325 (65 FE) MG EC TABLET    Take 1 tablet (325 mg total) by mouth once daily.    IRBESARTAN (AVAPRO) 150 MG TABLET    Take 1 tablet (150 mg total) by mouth every evening.    LEVOTHYROXINE (SYNTHROID) 25 MCG TABLET    Take 1 tablet (25 mcg total) by mouth before breakfast.    METOPROLOL SUCCINATE (TOPROL-XL) 25 MG 24 HR TABLET    Take 1 tablet (25 mg total) by mouth once daily.    NITROGLYCERIN (NITROSTAT) 0.4 MG SL TABLET    Place 1 tablet (0.4 mg total) under the tongue every 5 (five) minutes as needed.    OMEPRAZOLE (PRILOSEC) 40 MG CAPSULE    TAKE 1 CAPSULE EVERY MORNING    ROSUVASTATIN (CRESTOR) 40 MG TAB    TAKE 1 TABLET (40 MG TOTAL) BY MOUTH ONCE DAILY.   Modified Medications    No medications on file   Discontinued Medications    CHLORTHALIDONE (HYGROTEN) 25 MG TAB    TAKE 1 TABLET ONE TIME DAILY        Review of Systems   Constitutional: Negative for fever.   HENT: Negative for nosebleeds.    Cardiovascular: Negative for chest pain, dyspnea on exertion, irregular heartbeat, leg swelling, near-syncope, orthopnea, palpitations, paroxysmal nocturnal dyspnea and syncope.        As above   Respiratory: Negative for hemoptysis.    Hematologic/Lymphatic: Negative for bleeding problem.   Musculoskeletal: Negative for arthritis.   Gastrointestinal: Negative for hematochezia.   Genitourinary: Negative for hematuria.   Neurological: Negative for seizures.   Allergic/Immunologic: Negative for environmental allergies.         Objective:   Vitals  Vitals:    04/28/22 0912   BP: (!) 98/56   Pulse: 109   SpO2: 98%   Weight: 62 kg (136 lb 11 oz)          Physical Exam  Constitutional:       General: He is not in acute distress.     Appearance: He is well-developed. He is not diaphoretic.   HENT:      Head: Normocephalic.   Neck:      Vascular: No JVD.   Cardiovascular:      Rate and Rhythm: Normal rate. Rhythm irregular.      Heart sounds: No murmur heard.    No friction rub. No gallop.   Pulmonary:      Effort:  Pulmonary effort is normal. No respiratory distress.      Breath sounds: Normal breath sounds.   Abdominal:      Palpations: Abdomen is soft.      Tenderness: There is no abdominal tenderness.   Musculoskeletal:         General: No swelling.      Cervical back: Normal range of motion.   Skin:     General: Skin is warm.   Neurological:      Mental Status: He is alert.   Psychiatric:         Mood and Affect: Mood normal.             Assessment:     1. Coronary artery disease involving native coronary artery without angina pectoris, unspecified whether native or transplanted heart    2. Gastrointestinal hemorrhage, unspecified gastrointestinal hemorrhage type    3. PAD (peripheral artery disease)    4. Atrial fibrillation, unspecified type    5. Abdominal aortic aneurysm (AAA) without rupture    6. Essential hypertension    7. Hyperlipidemia, unspecified hyperlipidemia type        Plan:   Rex Song is a 77 y.o. male h/o CAD, PAD, HTN, HLD  H/o TIA, stroke    Accompanied by wife Jesisca    Adm April 2022 for symptomatic anemia 2/2 melena. 3 units PRBCs. EGD and colonoscopy with with no source of bleeding. OP video capsule study recommended. Discharge on clopidogrel with apixaban. Repeat OP coloscopy for polyp removal.    - EKG personally reviewed. My interpretation:  4/28/22: Atrial flutter HR 80s, variable block. RAD. RBBB. QTc 486  4/10/22: SR 60s, normal axis. IL STD. QTc 505  3/31/22: SB 50s, RAD. Subtle STD IL leads. QTc 457  11/15/21: Afib HR 100s, normal axis. Non-sp ST-T abn. QTc 486  - Echo April 2022  · The left ventricle is mildly enlarged with eccentric hypertrophy and mildly decreased systolic function.  · The estimated ejection fraction is 45%.  · Normal right ventricular size with normal right ventricular systolic function.  · Severe left atrial enlargement.  · Grade I left ventricular diastolic dysfunction.  · Normal central venous pressure (3 mmHg).  · The estimated PA systolic pressure is 33  mmHg.  - Echo Aug 2021: LVEF 30%, GHK, DD c/e Afib. Normal RVSF. Mild MR, TR. Mod LAE. No P-HTN  - Echo 2015: LVEF 50%, normal diastology. Normal RVSF. Trivial AI, TR. Low CVP    Swelling  Resolved  Had high salt diet couple of days back. Counseled on salt restriction    SOB  Resolved  Quit tobacco  Repeat echo 3 months  FUP 6 months    CAD  - On review of prior notes:  - sp NSTEMI 8/18/2000, sp stent LCx (x2?), PDA. Distal LAD 90% stenosis, ostial D2 90%, proximal LCx 90%, mid LCx 90%. Proximal PDA subtotal. Proximal RCA occluded  - Regadenoson nuclear stress 2015: Negative for ischemia (EKG, Nuc)  - Clopidogrel, statin    PAD  - GONZALO at rest: R-0.68, L-0.68. Exercise R-0.37, L-0.31  - US 2016: There is an Abdominal Aortic Aneurysm largest at the infrarenal level measuring 3.74 cm. There is an accelerated PSV at the right common iliac level of 224 cm/s indicating a possible stenosis.   - Clopidogrel, statin  - Cilostazol held d/t recent GIB    Afib, Atrial flutter  - Afib (sp DCCV Sept 2021)- converted to SR with amiodarone. Now in atrial flutter. Discussed DCCV and EP referral. Given incomplete GI work-up will defer for now. 1 month FUP to re-assess GI work-up, tolerance of current blood thinners, and repeat EKG before planning for DCCV  - CHADS-VASc 5 (HTN, Age x2, h/o stroke, CAD)  - HAS-BLED 3 (Age, clopidogrel, h/o stroke)  - Apixaban 5 mg bid (Age<80y, Cr>1.5, Wt>60 kg)  Lab Results   Component Value Date    CREATININE 3.01 (H) 04/25/2022    AST 23 04/25/2022    ALT 16 04/25/2022   - Amiodarone, metoprolol succinate    AAA  - Abd US Jan 2022:  Mildly aneurysmal abdominal aorta measuring 3.4 cm, demonstrating some mural thrombus similar to prior exam.  Clinical considerations will determine follow-up.  Based on aneurysm size, consider follow-up in 3 years.  - Abd US 2019: Infrarenal Aortic Aneurysm 3.3 cm  - US 2016: There is an Abdominal Aortic Aneurysm largest at the infrarenal level measuring 3.74 cm. There  is an accelerated PSV at the right common iliac level of 224 cm/s indicating a possible stenosis.     HTN  BP low  Amlodipine, irbesartan, metoprolol succinate  DC chlorthalidone  Monitor BP at home  Call in 1 week with log    HLD  Lab Results   Component Value Date    LDLCALC 43.6 (L) 04/11/2022   Statin as above    Continue with current medical plan and lifestyle changes.    Orders Placed This Encounter   Procedures    EKG 12-lead    Echo     Standing Status:   Future     Standing Expiration Date:   4/28/2023     Order Specific Question:   Release to patient     Answer:   Immediate       Follow up as scheduled  Return sooner for concerns or questions. If symptoms persist go to the ED    He expressed verbal understanding and agreed with the plan      Reshma Jones MD  Interventional Cardiology  [facility]  Phone: [phone]

## 2022-05-02 ENCOUNTER — TELEPHONE (OUTPATIENT)
Dept: NEUROLOGY | Facility: HOSPITAL | Age: 77
End: 2022-05-02
Payer: MEDICARE

## 2022-05-02 ENCOUNTER — TELEPHONE (OUTPATIENT)
Dept: ENDOSCOPY | Facility: HOSPITAL | Age: 77
End: 2022-05-02
Payer: MEDICARE

## 2022-05-02 NOTE — TELEPHONE ENCOUNTER
Jessica, wife, returning call to Endoscopy.  Wife given 8am arrival for vce to [facility] 1st floor Admit on Wednesday, May 4, 2022.  Repeated correctly.

## 2022-05-02 NOTE — TELEPHONE ENCOUNTER
----- Message from Fabian Suh sent at 5/2/2022  3:22 PM CDT -----  Type:  Patient Returning Call    Who Called:wife  Who Left Message for Patient:Etta  Does the patient know what this is regarding?:procedure  Would the patient rather a call back or a response via [employer]? call  Best Call Back Number: [phone]  Additional Information:

## 2022-05-02 NOTE — TELEPHONE ENCOUNTER
Left message instructing patient to call dept @ [phone] or [phone] between 8am-3pm.    Arrival time to be given @ 0800/VCE  Covid - vacc  (No [facility])

## 2022-05-04 ENCOUNTER — EXTERNAL HOME HEALTH (OUTPATIENT)
Dept: HOME HEALTH SERVICES | Facility: HOSPITAL | Age: 77
End: 2022-05-04
Payer: MEDICARE

## 2022-05-04 ENCOUNTER — INFUSION (OUTPATIENT)
Dept: INFUSION THERAPY | Facility: HOSPITAL | Age: 77
End: 2022-05-04
Attending: INTERNAL MEDICINE
Payer: MEDICARE

## 2022-05-04 VITALS
BODY MASS INDEX: 24.22 KG/M2 | RESPIRATION RATE: 17 BRPM | HEART RATE: 66 BPM | SYSTOLIC BLOOD PRESSURE: 112 MMHG | DIASTOLIC BLOOD PRESSURE: 55 MMHG | WEIGHT: 136.69 LBS | HEIGHT: 63 IN | OXYGEN SATURATION: 97 % | TEMPERATURE: 98 F

## 2022-05-04 DIAGNOSIS — D50.8 OTHER IRON DEFICIENCY ANEMIAS: Primary | ICD-10-CM

## 2022-05-04 PROCEDURE — 25000003 PHARM REV CODE 250: Performed by: INTERNAL MEDICINE

## 2022-05-04 PROCEDURE — 96365 THER/PROPH/DIAG IV INF INIT: CPT

## 2022-05-04 PROCEDURE — A4216 STERILE WATER/SALINE, 10 ML: HCPCS | Performed by: INTERNAL MEDICINE

## 2022-05-04 PROCEDURE — 63600175 PHARM REV CODE 636 W HCPCS: Performed by: INTERNAL MEDICINE

## 2022-05-04 RX ORDER — HEPARIN 100 UNIT/ML
500 SYRINGE INTRAVENOUS
Status: DISCONTINUED | OUTPATIENT
Start: 2022-05-04 | End: 2022-05-04 | Stop reason: HOSPADM

## 2022-05-04 RX ORDER — SODIUM CHLORIDE 0.9 % (FLUSH) 0.9 %
10 SYRINGE (ML) INJECTION
Status: DISCONTINUED | OUTPATIENT
Start: 2022-05-04 | End: 2022-05-04 | Stop reason: HOSPADM

## 2022-05-04 RX ADMIN — IRON SUCROSE 200 MG: 20 INJECTION, SOLUTION INTRAVENOUS at 01:05

## 2022-05-04 RX ADMIN — Medication 10 ML: at 02:05

## 2022-05-04 RX ADMIN — SODIUM CHLORIDE: 0.9 INJECTION, SOLUTION INTRAVENOUS at 01:05

## 2022-05-04 NOTE — NURSING
Pt received IV Venofer infusion dose 1/2. Pt tolerated it well. AVS given. Next appointment scheduled. Discharged with no acute distress.

## 2022-05-05 ENCOUNTER — TELEPHONE (OUTPATIENT)
Dept: NEUROLOGY | Facility: HOSPITAL | Age: 77
End: 2022-05-05
Payer: MEDICARE

## 2022-05-05 NOTE — TELEPHONE ENCOUNTER
----- Message from Yosvany Dominique MD sent at 5/5/2022  2:44 PM CDT -----  Please schedule this patient for clinic via audio visit next week to discuss his video capsule results and plan for management

## 2022-05-11 ENCOUNTER — OFFICE VISIT (OUTPATIENT)
Dept: NEUROLOGY | Facility: HOSPITAL | Age: 77
End: 2022-05-11
Attending: INTERNAL MEDICINE
Payer: MEDICARE

## 2022-05-11 ENCOUNTER — TELEPHONE (OUTPATIENT)
Dept: NEUROLOGY | Facility: HOSPITAL | Age: 77
End: 2022-05-11
Payer: MEDICARE

## 2022-05-11 ENCOUNTER — INFUSION (OUTPATIENT)
Dept: INFUSION THERAPY | Facility: HOSPITAL | Age: 77
End: 2022-05-11
Attending: INTERNAL MEDICINE
Payer: MEDICARE

## 2022-05-11 VITALS
SYSTOLIC BLOOD PRESSURE: 82 MMHG | RESPIRATION RATE: 17 BRPM | WEIGHT: 139 LBS | HEIGHT: 63 IN | BODY MASS INDEX: 24.63 KG/M2 | OXYGEN SATURATION: 97 % | DIASTOLIC BLOOD PRESSURE: 50 MMHG | HEART RATE: 62 BPM | TEMPERATURE: 99 F

## 2022-05-11 DIAGNOSIS — D50.8 OTHER IRON DEFICIENCY ANEMIAS: Primary | ICD-10-CM

## 2022-05-11 DIAGNOSIS — D50.0 IRON DEFICIENCY ANEMIA DUE TO CHRONIC BLOOD LOSS: Primary | ICD-10-CM

## 2022-05-11 PROCEDURE — 96365 THER/PROPH/DIAG IV INF INIT: CPT

## 2022-05-11 PROCEDURE — 63600175 PHARM REV CODE 636 W HCPCS: Performed by: INTERNAL MEDICINE

## 2022-05-11 PROCEDURE — 25000003 PHARM REV CODE 250: Performed by: INTERNAL MEDICINE

## 2022-05-11 PROCEDURE — A4216 STERILE WATER/SALINE, 10 ML: HCPCS | Performed by: INTERNAL MEDICINE

## 2022-05-11 RX ORDER — SODIUM CHLORIDE 0.9 % (FLUSH) 0.9 %
10 SYRINGE (ML) INJECTION
Status: DISCONTINUED | OUTPATIENT
Start: 2022-05-11 | End: 2022-05-11 | Stop reason: HOSPADM

## 2022-05-11 RX ADMIN — SODIUM CHLORIDE: 0.9 INJECTION, SOLUTION INTRAVENOUS at 01:05

## 2022-05-11 RX ADMIN — Medication 10 ML: at 02:05

## 2022-05-11 RX ADMIN — IRON SUCROSE 200 MG: 20 INJECTION, SOLUTION INTRAVENOUS at 01:05

## 2022-05-11 NOTE — NURSING
Patient tolerated infusion well. All questions and concerns addressed. Next appointment scheduled.

## 2022-05-11 NOTE — PROGRESS NOTES
[facility]- Audio Only Telehealth Visit     The patient location is: home  The chief complaint leading to consultation is: iron deficiency anemia  Visit type: Virtual visit with audio only (telephone)  Total time spent with patient: 20 minutes    The reason for the audio only service rather than synchronous audio and video virtual visit was related to technical difficulties or patient preference/necessity.     Each patient to whom I provide medical services by telemedicine is:  (1) informed of the relationship between the physician and patient and the respective role of any other health care provider with respect to management of the patient; and (2) notified that they may decline to receive medical services by telemedicine and may withdraw from such care at any time. Patient verbally consented to receive this service via voice-only telephone call.       HPI: 77 y.o. male recently admitted to the hospital with melena and severe iron deficiency anemia requiring blood transfusion who presents via telemedicine appointment to review video capsule endoscopy results.     VCE 5/4/22:  - Likely adenomatous polyp measuring 15 mm in size involving the ampulla with tissue irregularity suspicious for pre-malignant growth  - Otherwise unrevealing VCE without active bleeding visualized although the patient's anemia is likely related too chronic GI blood loss from one or more intestinal angioectasias which are unseen by EGD, colonoscopy, and VCE. However, bleeding from the ampullary lesion is fairly likely and the ampullary lesion should be evaluated for resection due to concern for malignant transformation.    Assessment and plan:    77 y.o. 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 Labs improving with parenteral iron.     - Refer to advanced endoscopy to discuss risks/benefits of EUS then potential ampullectomy as next step.  - Continue parenteral iron infusions with Injectafer 750 mg x2 doses scheduled every 6-12 months as his anemia is improving with iron supplementation.  - If this patient has blood loss from  angioectasias, expect GI blood loss to recur intermittently over time as these lesions are  typically multiple and challenging to cure by endoscopic ablation. Potential future options for therapy if anemia worsens despite iron therapy include repeat enteroscopy and/or sandostatin depot injections or tranexamic acid.      Yosvany Dominique MD   [address], [address]   [place] [address] [phone]     This service was not originating from a related E/M service provided within the previous 7 days nor will  to an E/M service or procedure within the next 24 hours or my soonest available appointment.  Prevailing standard of care was able to be met in this audio-only visit.

## 2022-05-11 NOTE — TELEPHONE ENCOUNTER
AVS with Jessica, wife.  Notified [facility] will notify to schedule EUS.  Advised to continue iv iron.  Wife acknowledged understanding.

## 2022-05-12 RX ORDER — ROSUVASTATIN CALCIUM 40 MG/1
40 TABLET, COATED ORAL DAILY
Qty: 90 TABLET | Refills: 3 | Status: SHIPPED | OUTPATIENT
Start: 2022-05-12 | End: 2022-05-16 | Stop reason: SDUPTHER

## 2022-05-12 NOTE — TELEPHONE ENCOUNTER
----- Message from Liu Shelley sent at 5/12/2022  2:22 PM CDT -----  Contact: Mrs Song  Type:  RX Refill Request    Who Called: pt's wife   Refill or New Rxrefill  RX Name and Strength:rosuvastatin 40mg  rosuvastatin (CRESTOR) 40 MG Tab     How is the patient currently taking it? (ex. 1XDay):  Is this a 30 day or 90 day RX:  Preferred Pharmacy with phone number: [id number] [place] [facility] [address] [facility]  Local or Mail Order:  Ordering Provider:Joe  Would the patient rather a call back or a response via [facility]? Call   Best Call Back Number: [phone]  Additional Information:       Please [facility] over walker orders [facility]    Fax # [phone]

## 2022-05-16 RX ORDER — ROSUVASTATIN CALCIUM 40 MG/1
40 TABLET, COATED ORAL DAILY
Qty: 90 TABLET | Refills: 3 | Status: SHIPPED | OUTPATIENT
Start: 2022-05-16 | End: 2023-05-26

## 2022-05-16 NOTE — TELEPHONE ENCOUNTER
----- Message from Ophelia Campos sent at 5/16/2022  3:13 PM CDT -----  Regarding: Refill  Type:  RX Refill Request    Who Called: pt    Refill or New Rx: refill    RX Name and Strength: apixaban (ELIQUIS) 5 mg Tab    Preferred Pharmacy with phone number: [facility]/pharmacy #[id number] - [place], [place] - [address] [facility] [address] [facility]  [address] [facility] [place] [address]  Phone: [phone] Fax: [phone]    Would the patient rather a call back or a response via [facility]? Call    Best Call Back Number:# [phone]

## 2022-05-18 ENCOUNTER — INFUSION (OUTPATIENT)
Dept: INFUSION THERAPY | Facility: HOSPITAL | Age: 77
End: 2022-05-18
Attending: INTERNAL MEDICINE
Payer: MEDICARE

## 2022-05-18 VITALS
WEIGHT: 139 LBS | TEMPERATURE: 98 F | SYSTOLIC BLOOD PRESSURE: 117 MMHG | DIASTOLIC BLOOD PRESSURE: 73 MMHG | BODY MASS INDEX: 24.63 KG/M2 | RESPIRATION RATE: 17 BRPM | HEIGHT: 63 IN | HEART RATE: 90 BPM | OXYGEN SATURATION: 93 %

## 2022-05-18 DIAGNOSIS — D50.8 OTHER IRON DEFICIENCY ANEMIAS: Primary | ICD-10-CM

## 2022-05-18 PROCEDURE — 25000003 PHARM REV CODE 250: Performed by: INTERNAL MEDICINE

## 2022-05-18 PROCEDURE — A4216 STERILE WATER/SALINE, 10 ML: HCPCS | Performed by: INTERNAL MEDICINE

## 2022-05-18 PROCEDURE — 96365 THER/PROPH/DIAG IV INF INIT: CPT

## 2022-05-18 PROCEDURE — 63600175 PHARM REV CODE 636 W HCPCS: Performed by: INTERNAL MEDICINE

## 2022-05-18 RX ORDER — SODIUM CHLORIDE 0.9 % (FLUSH) 0.9 %
10 SYRINGE (ML) INJECTION
Status: DISCONTINUED | OUTPATIENT
Start: 2022-05-18 | End: 2022-05-18 | Stop reason: HOSPADM

## 2022-05-18 RX ORDER — HEPARIN 100 UNIT/ML
500 SYRINGE INTRAVENOUS
Status: DISCONTINUED | OUTPATIENT
Start: 2022-05-18 | End: 2022-05-18 | Stop reason: HOSPADM

## 2022-05-18 RX ADMIN — SODIUM CHLORIDE: 0.9 INJECTION, SOLUTION INTRAVENOUS at 01:05

## 2022-05-18 RX ADMIN — Medication 10 ML: at 02:05

## 2022-05-18 RX ADMIN — IRON SUCROSE 200 MG: 20 INJECTION, SOLUTION INTRAVENOUS at 01:05

## 2022-05-18 NOTE — NURSING
"Pt received IV Venofer infusion dose 3/4. Pt tolerated it well. AVS given. Next appointment scheduled.Patient stood up to leave and c/o dizziness. Assisted patient back into chair and checked VS, all stable (see flowsheet). Offered snacks, juice/fluids and patient refused.Offered w/c, refused. Observed patient while sitting for a few minutes. Assisted patient to standing position and walked him to waiting room. Wife in waiting room and will drive patient home. Instructed wife to have patient eat and drink more fluids. Patient reports dizziness resolved and stated, "I'm OK." Discharged with no acute distress.    "

## 2022-05-19 ENCOUNTER — TELEPHONE (OUTPATIENT)
Dept: FAMILY MEDICINE | Facility: CLINIC | Age: 77
End: 2022-05-19
Payer: MEDICARE

## 2022-05-25 ENCOUNTER — INFUSION (OUTPATIENT)
Dept: INFUSION THERAPY | Facility: HOSPITAL | Age: 77
End: 2022-05-25
Attending: INTERNAL MEDICINE
Payer: MEDICARE

## 2022-05-25 VITALS
DIASTOLIC BLOOD PRESSURE: 84 MMHG | TEMPERATURE: 98 F | HEART RATE: 104 BPM | SYSTOLIC BLOOD PRESSURE: 119 MMHG | BODY MASS INDEX: 24.63 KG/M2 | WEIGHT: 139 LBS | HEIGHT: 63 IN | OXYGEN SATURATION: 97 % | RESPIRATION RATE: 18 BRPM

## 2022-05-25 DIAGNOSIS — D50.8 OTHER IRON DEFICIENCY ANEMIAS: Primary | ICD-10-CM

## 2022-05-25 PROCEDURE — 25000003 PHARM REV CODE 250: Performed by: INTERNAL MEDICINE

## 2022-05-25 PROCEDURE — 96365 THER/PROPH/DIAG IV INF INIT: CPT

## 2022-05-25 PROCEDURE — A4216 STERILE WATER/SALINE, 10 ML: HCPCS | Performed by: INTERNAL MEDICINE

## 2022-05-25 PROCEDURE — 63600175 PHARM REV CODE 636 W HCPCS: Performed by: INTERNAL MEDICINE

## 2022-05-25 RX ORDER — HEPARIN 100 UNIT/ML
500 SYRINGE INTRAVENOUS
Status: DISCONTINUED | OUTPATIENT
Start: 2022-05-25 | End: 2022-05-25 | Stop reason: HOSPADM

## 2022-05-25 RX ORDER — SODIUM CHLORIDE 0.9 % (FLUSH) 0.9 %
10 SYRINGE (ML) INJECTION
Status: DISCONTINUED | OUTPATIENT
Start: 2022-05-25 | End: 2022-05-25 | Stop reason: HOSPADM

## 2022-05-25 RX ADMIN — IRON SUCROSE 200 MG: 20 INJECTION, SOLUTION INTRAVENOUS at 10:05

## 2022-05-25 RX ADMIN — SODIUM CHLORIDE: 0.9 INJECTION, SOLUTION INTRAVENOUS at 10:05

## 2022-05-25 RX ADMIN — Medication 10 ML: at 12:05

## 2022-06-02 ENCOUNTER — OFFICE VISIT (OUTPATIENT)
Dept: CARDIOLOGY | Facility: CLINIC | Age: 77
End: 2022-06-02
Payer: MEDICARE

## 2022-06-02 VITALS
BODY MASS INDEX: 24.45 KG/M2 | HEART RATE: 90 BPM | DIASTOLIC BLOOD PRESSURE: 73 MMHG | WEIGHT: 138 LBS | OXYGEN SATURATION: 96 % | SYSTOLIC BLOOD PRESSURE: 118 MMHG

## 2022-06-02 DIAGNOSIS — I10 ESSENTIAL HYPERTENSION: ICD-10-CM

## 2022-06-02 DIAGNOSIS — I71.40 ABDOMINAL AORTIC ANEURYSM (AAA) WITHOUT RUPTURE: ICD-10-CM

## 2022-06-02 DIAGNOSIS — I73.9 PAD (PERIPHERAL ARTERY DISEASE): ICD-10-CM

## 2022-06-02 DIAGNOSIS — I48.91 ATRIAL FIBRILLATION, UNSPECIFIED TYPE: ICD-10-CM

## 2022-06-02 DIAGNOSIS — E78.5 HYPERLIPIDEMIA, UNSPECIFIED HYPERLIPIDEMIA TYPE: ICD-10-CM

## 2022-06-02 DIAGNOSIS — R60.9 SWELLING: ICD-10-CM

## 2022-06-02 DIAGNOSIS — I25.119 CORONARY ARTERY DISEASE INVOLVING NATIVE HEART WITH ANGINA PECTORIS, UNSPECIFIED VESSEL OR LESION TYPE: Primary | ICD-10-CM

## 2022-06-02 PROCEDURE — 1159F MED LIST DOCD IN RCRD: CPT | Mod: CPTII,S$GLB,, | Performed by: INTERNAL MEDICINE

## 2022-06-02 PROCEDURE — 1159F PR MEDICATION LIST DOCUMENTED IN MEDICAL RECORD: ICD-10-PCS | Mod: CPTII,S$GLB,, | Performed by: INTERNAL MEDICINE

## 2022-06-02 PROCEDURE — 3078F DIAST BP <80 MM HG: CPT | Mod: CPTII,S$GLB,, | Performed by: INTERNAL MEDICINE

## 2022-06-02 PROCEDURE — 3074F PR MOST RECENT SYSTOLIC BLOOD PRESSURE < 130 MM HG: ICD-10-PCS | Mod: CPTII,S$GLB,, | Performed by: INTERNAL MEDICINE

## 2022-06-02 PROCEDURE — 1160F RVW MEDS BY RX/DR IN RCRD: CPT | Mod: CPTII,S$GLB,, | Performed by: INTERNAL MEDICINE

## 2022-06-02 PROCEDURE — 99999 PR PBB SHADOW E&M-EST. PATIENT-LVL III: CPT | Mod: PBBFAC,,, | Performed by: INTERNAL MEDICINE

## 2022-06-02 PROCEDURE — 1160F PR REVIEW ALL MEDS BY PRESCRIBER/CLIN PHARMACIST DOCUMENTED: ICD-10-PCS | Mod: CPTII,S$GLB,, | Performed by: INTERNAL MEDICINE

## 2022-06-02 PROCEDURE — 99499 RISK ADDL DX/OHS AUDIT: ICD-10-PCS | Mod: S$GLB,,, | Performed by: INTERNAL MEDICINE

## 2022-06-02 PROCEDURE — 93000 ELECTROCARDIOGRAM COMPLETE: CPT | Mod: S$GLB,,, | Performed by: INTERNAL MEDICINE

## 2022-06-02 PROCEDURE — 1157F ADVNC CARE PLAN IN RCRD: CPT | Mod: CPTII,S$GLB,, | Performed by: INTERNAL MEDICINE

## 2022-06-02 PROCEDURE — 1126F AMNT PAIN NOTED NONE PRSNT: CPT | Mod: CPTII,S$GLB,, | Performed by: INTERNAL MEDICINE

## 2022-06-02 PROCEDURE — 99999 PR PBB SHADOW E&M-EST. PATIENT-LVL III: ICD-10-PCS | Mod: PBBFAC,,, | Performed by: INTERNAL MEDICINE

## 2022-06-02 PROCEDURE — 1126F PR PAIN SEVERITY QUANTIFIED, NO PAIN PRESENT: ICD-10-PCS | Mod: CPTII,S$GLB,, | Performed by: INTERNAL MEDICINE

## 2022-06-02 PROCEDURE — 3074F SYST BP LT 130 MM HG: CPT | Mod: CPTII,S$GLB,, | Performed by: INTERNAL MEDICINE

## 2022-06-02 PROCEDURE — 93000 EKG 12-LEAD: ICD-10-PCS | Mod: S$GLB,,, | Performed by: INTERNAL MEDICINE

## 2022-06-02 PROCEDURE — 99499 UNLISTED E&M SERVICE: CPT | Mod: S$GLB,,, | Performed by: INTERNAL MEDICINE

## 2022-06-02 PROCEDURE — 99215 PR OFFICE/OUTPT VISIT, EST, LEVL V, 40-54 MIN: ICD-10-PCS | Mod: 25,S$GLB,, | Performed by: INTERNAL MEDICINE

## 2022-06-02 PROCEDURE — 99215 OFFICE O/P EST HI 40 MIN: CPT | Mod: 25,S$GLB,, | Performed by: INTERNAL MEDICINE

## 2022-06-02 PROCEDURE — 3078F PR MOST RECENT DIASTOLIC BLOOD PRESSURE < 80 MM HG: ICD-10-PCS | Mod: CPTII,S$GLB,, | Performed by: INTERNAL MEDICINE

## 2022-06-02 PROCEDURE — 1157F PR ADVANCE CARE PLAN OR EQUIV PRESENT IN MEDICAL RECORD: ICD-10-PCS | Mod: CPTII,S$GLB,, | Performed by: INTERNAL MEDICINE

## 2022-06-02 NOTE — PROGRESS NOTES
Cardiology Clinic note    Subjective:   Patient ID:  Rex Song is a 77 y.o. male who presents for CAD, PAD FUP    HPI:   CAD: Denies CP, REBOLLEDO. Walking a block    PAD: No pain in the legs. No claudications symptoms (reports arthritis). No ulcers / wounds    Afib: No palpitations, irregular heart beat, syncope or near syncope    HTN: On medications    HLD: Tolerating statin    SH Tobacco quit April 2022  FH No premature CAD    Patient Active Problem List    Diagnosis Date Noted    Other iron deficiency anemias 04/20/2022    Hypothyroidism 04/11/2022    Thrombocytopenia 04/11/2022    GI bleed     Acute blood loss anemia     Anemia     Weakness     Atrial fibrillation 04/10/2022    HFrEF (heart failure with reduced ejection fraction) 04/10/2022    Acute renal injury 04/10/2022    Complex renal cyst 02/07/2018    CKD (chronic kidney disease) stage 4, GFR 15-29 ml/min 06/03/2015    Tobacco abuse 06/03/2015    Screening for colon cancer 11/12/2014    Benign neoplasm of colon 10/29/2014    Colon cancer screening 04/30/2014    Dysphagia 03/24/2014    CAD (coronary artery disease) 11/12/2012      RCA      Post PTCA 11/12/2012      LCX and PDA  stent 08/18/2000       Essential hypertension 11/12/2012    Mixed hyperlipidemia 11/12/2012    AAA (abdominal aortic aneurysm) 11/12/2012    PVD (peripheral vascular disease) 11/12/2012    Multiple sclerosis 11/12/2012       Patient's Medications   New Prescriptions    No medications on file   Previous Medications    AMIODARONE (PACERONE) 200 MG TAB    Take 0.5 tablets (100 mg total) by mouth once daily.    AMLODIPINE (NORVASC) 5 MG TABLET    TAKE 1 TABLET EVERY DAY    APIXABAN (ELIQUIS) 5 MG TAB    Take 1 tablet (5 mg total) by mouth 2 (two) times daily.    CILOSTAZOL (PLETAL) 100 MG TAB    Take 100 mg by mouth 2 (two) times daily.    CLOPIDOGREL (PLAVIX) 75 MG TABLET    Take 1 tablet (75 mg total) by mouth once daily.    FERROUS SULFATE 325 (65 FE)  MG EC TABLET    Take 1 tablet (325 mg total) by mouth once daily.    IRBESARTAN (AVAPRO) 150 MG TABLET    Take 1 tablet (150 mg total) by mouth every evening.    LEVOTHYROXINE (SYNTHROID) 25 MCG TABLET    Take 1 tablet (25 mcg total) by mouth before breakfast.    METOPROLOL SUCCINATE (TOPROL-XL) 25 MG 24 HR TABLET    Take 1 tablet (25 mg total) by mouth once daily.    NITROGLYCERIN (NITROSTAT) 0.4 MG SL TABLET    Place 1 tablet (0.4 mg total) under the tongue every 5 (five) minutes as needed.    OMEPRAZOLE (PRILOSEC) 40 MG CAPSULE    TAKE 1 CAPSULE EVERY MORNING    ROSUVASTATIN (CRESTOR) 40 MG TAB    Take 1 tablet (40 mg total) by mouth once daily.   Modified Medications    No medications on file   Discontinued Medications    No medications on file        Review of Systems   Constitutional: Negative for fever.   HENT: Negative for nosebleeds.    Cardiovascular: Negative for chest pain, dyspnea on exertion, irregular heartbeat, leg swelling, near-syncope, orthopnea, palpitations, paroxysmal nocturnal dyspnea and syncope.        As above   Respiratory: Negative for hemoptysis.    Hematologic/Lymphatic: Negative for bleeding problem.   Musculoskeletal: Negative for arthritis.   Gastrointestinal: Negative for hematochezia.   Genitourinary: Negative for hematuria.   Neurological: Negative for seizures.   Allergic/Immunologic: Negative for environmental allergies.         Objective:   Vitals  Vitals:    06/02/22 1457   BP: 118/73   Pulse: 90   SpO2: 96%   Weight: 62.6 kg (138 lb)          Physical Exam  Constitutional:       General: He is not in acute distress.     Appearance: He is well-developed. He is not diaphoretic.   HENT:      Head: Normocephalic.   Neck:      Vascular: No JVD.   Cardiovascular:      Rate and Rhythm: Normal rate. Rhythm irregular.      Heart sounds: No murmur heard.    No friction rub. No gallop.   Pulmonary:      Effort: Pulmonary effort is normal. No respiratory distress.      Breath sounds:  Normal breath sounds.   Abdominal:      Palpations: Abdomen is soft.      Tenderness: There is no abdominal tenderness.   Musculoskeletal:         General: No swelling.      Cervical back: Normal range of motion.   Skin:     General: Skin is warm.   Neurological:      Mental Status: He is alert.   Psychiatric:         Mood and Affect: Mood normal.             Assessment:     1. Coronary artery disease involving native heart with angina pectoris, unspecified vessel or lesion type    2. Swelling    3. PAD (peripheral artery disease)    4. Atrial fibrillation, unspecified type    5. Abdominal aortic aneurysm (AAA) without rupture    6. Essential hypertension    7. Hyperlipidemia, unspecified hyperlipidemia type        Plan:   Rex Song is a 77 y.o. male h/o CAD, PAD, HTN, HLD  H/o TIA, stroke    Accompanied by wife Jessica    Adm April 2022 for symptomatic anemia 2/2 melena. 3 units PRBCs. EGD and colonoscopy with with no source of bleeding. OP video capsule study recommended. Discharge on clopidogrel with apixaban. Repeat OP coloscopy for polyp removal.    - EKG personally reviewed. My interpretation:  6/2/22: Afib HR 80s, RSAD. AS infarct. Possible lateral infarct. Non-sp ST-T abn. Non-sp IVCD. QTc 528  4/28/22: Atrial flutter HR 80s, variable block. RAD. RBBB. QTc 486  4/10/22: SR 60s, normal axis. IL STD. QTc 505  3/31/22: SB 50s, RAD. Subtle STD IL leads. QTc 457  11/15/21: Afib HR 100s, normal axis. Non-sp ST-T abn. QTc 486  - Echo April 2022  · The left ventricle is mildly enlarged with eccentric hypertrophy and mildly decreased systolic function.  · The estimated ejection fraction is 45%.  · Normal right ventricular size with normal right ventricular systolic function.  · Severe left atrial enlargement.  · Grade I left ventricular diastolic dysfunction.  · Normal central venous pressure (3 mmHg).  · The estimated PA systolic pressure is 33 mmHg.  - Echo Aug 2021: LVEF 30%, GHK, DD c/e Afib. Normal RVSF.  Mild MR, TR. Mod LAE. No P-HTN  - Echo 2015: LVEF 50%, normal diastology. Normal RVSF. Trivial AI, TR. Low CVP    CAD  - On review of prior notes:  - sp NSTEMI 8/18/2000, sp stent LCx (x2?), PDA. Distal LAD 90% stenosis, ostial D2 90%, proximal LCx 90%, mid LCx 90%. Proximal PDA subtotal. Proximal RCA occluded  - Regadenoson nuclear stress 2015: Negative for ischemia (EKG, Nuc)  - Clopidogrel, statin    PAD  - GONZALO at rest: R-0.68, L-0.68. Exercise R-0.37, L-0.31  - US 2016: There is an Abdominal Aortic Aneurysm largest at the infrarenal level measuring 3.74 cm. There is an accelerated PSV at the right common iliac level of 224 cm/s indicating a possible stenosis.   - Clopidogrel, statin  - Cilostazol held d/t recent GIB    Afib, Atrial flutter  - Afib (sp DCCV Sept 2021)- converted to SR with amiodarone. Atrial flutter noted April 2022. Afib EKG June 2022. Discussed DCCV and EP referral.  - GI work up May 2022:  - Likely adenomatous polyp involving the ampulla with tissue irregularity suspicious for pre-malignant growth  - Referred to advanced endoscopy to discuss r/b EUS, ampullectomy  - CHADS-VASc 5 (HTN, Age x2, h/o stroke, CAD)  - HAS-BLED 3 (Age, clopidogrel, h/o stroke)  - Apixaban 5 mg bid (Age<80y, Cr>1.5, Wt>60 kg)  Lab Results   Component Value Date    CREATININE 3.01 (H) 04/25/2022    AST 23 04/25/2022    ALT 16 04/25/2022   - Amiodarone, metoprolol succinate  - EP referral    AAA  - Abd US Jan 2022:  Mildly aneurysmal abdominal aorta measuring 3.4 cm, demonstrating some mural thrombus similar to prior exam.  Clinical considerations will determine follow-up.  Based on aneurysm size, consider follow-up in 3 years.  - Abd US 2019: Infrarenal Aortic Aneurysm 3.3 cm  - US 2016: There is an Abdominal Aortic Aneurysm largest at the infrarenal level measuring 3.74 cm. There is an accelerated PSV at the right common iliac level of 224 cm/s indicating a possible stenosis.     HTN  BP well controlled  Amlodipine,  irbesartan, metoprolol succinate    HLD  Lab Results   Component Value Date    LDLCALC 43.6 (L) 04/11/2022   Statin as above      Continue with current medical plan and lifestyle changes.    Orders Placed This Encounter   Procedures    Ambulatory referral/consult to Cardiac Electrophysiology     Standing Status:   Future     Standing Expiration Date:   7/2/2023     Referral Priority:   Routine     Referral Type:   Consultation     Referral Reason:   Specialty Services Required     Requested Specialty:   Cardiology     Number of Visits Requested:   1    EKG 12-lead    Echo     Standing Status:   Future     Standing Expiration Date:   6/2/2023     Order Specific Question:   Release to patient     Answer:   Immediate       Follow up as scheduled  Return sooner for concerns or questions. If symptoms persist go to the ED    He expressed verbal understanding and agreed with the plan      Reshma Jones MD  Interventional Cardiology  [facility]  Phone: [phone]

## 2022-06-06 DIAGNOSIS — I48.91 ATRIAL FIBRILLATION, UNSPECIFIED TYPE: Primary | ICD-10-CM

## 2022-06-16 ENCOUNTER — HOSPITAL ENCOUNTER (OUTPATIENT)
Dept: CARDIOLOGY | Facility: CLINIC | Age: 77
Discharge: HOME | End: 2022-06-16
Payer: MEDICARE

## 2022-06-16 ENCOUNTER — OFFICE VISIT (OUTPATIENT)
Dept: ELECTROPHYSIOLOGY | Facility: CLINIC | Age: 77
End: 2022-06-16
Payer: MEDICARE

## 2022-06-16 VITALS
HEIGHT: 65 IN | SYSTOLIC BLOOD PRESSURE: 129 MMHG | WEIGHT: 140.19 LBS | BODY MASS INDEX: 23.36 KG/M2 | HEART RATE: 91 BPM | DIASTOLIC BLOOD PRESSURE: 77 MMHG

## 2022-06-16 DIAGNOSIS — I48.91 ATRIAL FIBRILLATION, UNSPECIFIED TYPE: ICD-10-CM

## 2022-06-16 DIAGNOSIS — I73.9 PVD (PERIPHERAL VASCULAR DISEASE): Chronic | ICD-10-CM

## 2022-06-16 DIAGNOSIS — I50.20 HFREF (HEART FAILURE WITH REDUCED EJECTION FRACTION): Chronic | ICD-10-CM

## 2022-06-16 DIAGNOSIS — D50.8 OTHER IRON DEFICIENCY ANEMIAS: ICD-10-CM

## 2022-06-16 DIAGNOSIS — I10 ESSENTIAL HYPERTENSION: ICD-10-CM

## 2022-06-16 DIAGNOSIS — N18.4 CKD (CHRONIC KIDNEY DISEASE) STAGE 4, GFR 15-29 ML/MIN: Chronic | ICD-10-CM

## 2022-06-16 DIAGNOSIS — I25.10 CORONARY ARTERY DISEASE INVOLVING NATIVE CORONARY ARTERY WITHOUT ANGINA PECTORIS, UNSPECIFIED WHETHER NATIVE OR TRANSPLANTED HEART: ICD-10-CM

## 2022-06-16 DIAGNOSIS — I48.91 ATRIAL FIBRILLATION, UNSPECIFIED TYPE: Primary | ICD-10-CM

## 2022-06-16 PROCEDURE — 1159F MED LIST DOCD IN RCRD: CPT | Mod: CPTII,S$GLB,, | Performed by: INTERNAL MEDICINE

## 2022-06-16 PROCEDURE — 1160F PR REVIEW ALL MEDS BY PRESCRIBER/CLIN PHARMACIST DOCUMENTED: ICD-10-PCS | Mod: CPTII,S$GLB,, | Performed by: INTERNAL MEDICINE

## 2022-06-16 PROCEDURE — 93010 ELECTROCARDIOGRAM REPORT: CPT | Mod: S$GLB,,, | Performed by: INTERNAL MEDICINE

## 2022-06-16 PROCEDURE — 99204 PR OFFICE/OUTPT VISIT, NEW, LEVL IV, 45-59 MIN: ICD-10-PCS | Mod: S$GLB,,, | Performed by: INTERNAL MEDICINE

## 2022-06-16 PROCEDURE — 1157F PR ADVANCE CARE PLAN OR EQUIV PRESENT IN MEDICAL RECORD: ICD-10-PCS | Mod: CPTII,S$GLB,, | Performed by: INTERNAL MEDICINE

## 2022-06-16 PROCEDURE — 93005 RHYTHM STRIP: ICD-10-PCS | Mod: S$GLB,,, | Performed by: INTERNAL MEDICINE

## 2022-06-16 PROCEDURE — 3074F SYST BP LT 130 MM HG: CPT | Mod: CPTII,S$GLB,, | Performed by: INTERNAL MEDICINE

## 2022-06-16 PROCEDURE — 1157F ADVNC CARE PLAN IN RCRD: CPT | Mod: CPTII,S$GLB,, | Performed by: INTERNAL MEDICINE

## 2022-06-16 PROCEDURE — 1160F RVW MEDS BY RX/DR IN RCRD: CPT | Mod: CPTII,S$GLB,, | Performed by: INTERNAL MEDICINE

## 2022-06-16 PROCEDURE — 99999 PR PBB SHADOW E&M-EST. PATIENT-LVL IV: ICD-10-PCS | Mod: PBBFAC,,, | Performed by: INTERNAL MEDICINE

## 2022-06-16 PROCEDURE — 99204 OFFICE O/P NEW MOD 45 MIN: CPT | Mod: S$GLB,,, | Performed by: INTERNAL MEDICINE

## 2022-06-16 PROCEDURE — 99999 PR PBB SHADOW E&M-EST. PATIENT-LVL IV: CPT | Mod: PBBFAC,,, | Performed by: INTERNAL MEDICINE

## 2022-06-16 PROCEDURE — 93010 RHYTHM STRIP: ICD-10-PCS | Mod: S$GLB,,, | Performed by: INTERNAL MEDICINE

## 2022-06-16 PROCEDURE — 1126F AMNT PAIN NOTED NONE PRSNT: CPT | Mod: CPTII,S$GLB,, | Performed by: INTERNAL MEDICINE

## 2022-06-16 PROCEDURE — 3078F PR MOST RECENT DIASTOLIC BLOOD PRESSURE < 80 MM HG: ICD-10-PCS | Mod: CPTII,S$GLB,, | Performed by: INTERNAL MEDICINE

## 2022-06-16 PROCEDURE — 3078F DIAST BP <80 MM HG: CPT | Mod: CPTII,S$GLB,, | Performed by: INTERNAL MEDICINE

## 2022-06-16 PROCEDURE — 1126F PR PAIN SEVERITY QUANTIFIED, NO PAIN PRESENT: ICD-10-PCS | Mod: CPTII,S$GLB,, | Performed by: INTERNAL MEDICINE

## 2022-06-16 PROCEDURE — 93005 ELECTROCARDIOGRAM TRACING: CPT | Mod: S$GLB,,, | Performed by: INTERNAL MEDICINE

## 2022-06-16 PROCEDURE — 3074F PR MOST RECENT SYSTOLIC BLOOD PRESSURE < 130 MM HG: ICD-10-PCS | Mod: CPTII,S$GLB,, | Performed by: INTERNAL MEDICINE

## 2022-06-16 PROCEDURE — 1159F PR MEDICATION LIST DOCUMENTED IN MEDICAL RECORD: ICD-10-PCS | Mod: CPTII,S$GLB,, | Performed by: INTERNAL MEDICINE

## 2022-06-16 RX ORDER — METOPROLOL SUCCINATE 50 MG/1
50 TABLET, EXTENDED RELEASE ORAL DAILY
Qty: 90 TABLET | Refills: 3 | Status: SHIPPED | OUTPATIENT
Start: 2022-06-16 | End: 2023-07-24

## 2022-06-16 NOTE — PROGRESS NOTES
Subjective:    Patient ID:  Rex Song is a 77 y.o. male who presents for evaluation of Atrial Fibrillation      HPI  I had the pleasure of seeing Mr. Song today in our electrophysiology clinic in consultation for his atrial arrhythmia. As you are aware he is a pleasant 77 year-old man with coronary artery disease (RCA , PCI to LCX/PDA) with a LVEF of 45%, hypertension, multiple sclerosis, stage 4 chronic kidney disease, blood loss anemia, and atrial fibrillation/flutter despite amiodarone therapy. His AF was originally observed in July of 2021 which persisted. A holter monitor from August 2021 noted rate controlled AF. His EF was observed to decline to 30%. He underwent DCCV in 9/2021 with early recurrence of AF (EF noted to be 25% on KERRY). He was initiated on amiodarone at a follow-up visit with Dr. Jones in November of 2021. He was then observed to be in sinus rhythm in February of 2022. An echocardiogram in March of 2022 noted improvement in his LVEF to 45%. He was admitted to the hospital in April of 2022 with fatigue, anemia, and melena requiring transfusions. EGD/colonoscopy were unrevealing. Video capsule was also unrevealing. His AF recurred at this time for which he presents. He currently has no complaints.     I reviewed available ECGs which show sinus rhythm or AF.    My interpretation of today's in clinic ECG is AF with an average ventricular rate of 91 bpm.    Review of Systems   Constitutional: Negative for fever and malaise/fatigue.   HENT: Negative for congestion and sore throat.    Eyes: Negative for blurred vision and visual disturbance.   Cardiovascular: Negative for chest pain, dyspnea on exertion, irregular heartbeat, near-syncope, palpitations and syncope.   Respiratory: Negative for cough and shortness of breath.    Hematologic/Lymphatic: Negative for bleeding problem. Does not bruise/bleed easily.   Skin: Negative.    Musculoskeletal: Negative.    Gastrointestinal: Negative for  bloating, abdominal pain, hematochezia and melena.   Neurological: Negative for focal weakness and weakness.   Psychiatric/Behavioral: Negative.         Objective:    Physical Exam  Vitals reviewed.   Constitutional:       General: He is not in acute distress.     Appearance: He is well-developed. He is not diaphoretic.   HENT:      Head: Normocephalic and atraumatic.   Eyes:      General:         Right eye: No discharge.         Left eye: No discharge.      Conjunctiva/sclera: Conjunctivae normal.   Cardiovascular:      Rate and Rhythm: Normal rate. Rhythm irregularly irregular.      Heart sounds: No murmur heard.    No friction rub. No gallop.   Pulmonary:      Effort: Pulmonary effort is normal. No respiratory distress.      Breath sounds: Normal breath sounds. No wheezing or rales.   Abdominal:      General: Bowel sounds are normal. There is no distension.      Palpations: Abdomen is soft.      Tenderness: There is no abdominal tenderness.   Musculoskeletal:      Cervical back: Neck supple.   Skin:     General: Skin is warm and dry.   Neurological:      Mental Status: He is alert and oriented to person, place, and time.   Psychiatric:         Behavior: Behavior normal.         Thought Content: Thought content normal.         Judgment: Judgment normal.           Assessment:       1. Atrial fibrillation, unspecified type    2. Coronary artery disease involving native coronary artery without angina pectoris, unspecified whether native or transplanted heart    3. Essential hypertension    4. HFrEF (heart failure with reduced ejection fraction)    5. PVD (peripheral vascular disease)    6. CKD (chronic kidney disease) stage 4, GFR 15-29 ml/min    7. Other iron deficiency anemias         Plan:       In summary, Mr. Song  is a pleasant 77 year-old man with coronary artery disease (RCA , PCI to LCX/PDA) with a LVEF of 45%, hypertension, multiple sclerosis, stage 4 chronic kidney disease, blood loss anemia, and  atrial fibrillation/flutter despite amiodarone therapy. Discussed concern that his decline in LVEF may have been related to his AF persistence. Discussed trial of DCCV and increasing amiodarone with seeing if he could tolerate sinus bradycardia in the 50s. Discussed PVI as an option. He was not interested in either of those and reports he would rather remain in AF. Will stop amiodarone, increase metoprolol and get a 24hr holter in 3 months prior to return visit.    Discussed my concern for his long-term tolerability of anticoagulation. Discussed Watchman implantation. We had discussed the process of the procedure and he had no interest in it.     RTC in 3 months with 24 hour holter    Thank you for allowing me to participate in the care of this patient. Please do not hesitate to call me with any questions or concerns.    Nemesio Raza MD, PhD  Cardiac Electrophysiology

## 2022-06-23 ENCOUNTER — OFFICE VISIT (OUTPATIENT)
Dept: HEMATOLOGY/ONCOLOGY | Facility: CLINIC | Age: 77
End: 2022-06-23
Payer: MEDICARE

## 2022-06-23 VITALS
BODY MASS INDEX: 23.04 KG/M2 | HEART RATE: 51 BPM | DIASTOLIC BLOOD PRESSURE: 62 MMHG | WEIGHT: 138.44 LBS | OXYGEN SATURATION: 93 % | SYSTOLIC BLOOD PRESSURE: 118 MMHG | RESPIRATION RATE: 16 BRPM

## 2022-06-23 DIAGNOSIS — D62 ACUTE BLOOD LOSS ANEMIA: ICD-10-CM

## 2022-06-23 DIAGNOSIS — D50.8 OTHER IRON DEFICIENCY ANEMIAS: Primary | ICD-10-CM

## 2022-06-23 PROCEDURE — 99214 OFFICE O/P EST MOD 30 MIN: CPT | Mod: S$GLB,,, | Performed by: INTERNAL MEDICINE

## 2022-06-23 PROCEDURE — 1126F PR PAIN SEVERITY QUANTIFIED, NO PAIN PRESENT: ICD-10-PCS | Mod: CPTII,S$GLB,, | Performed by: INTERNAL MEDICINE

## 2022-06-23 PROCEDURE — 99214 PR OFFICE/OUTPT VISIT, EST, LEVL IV, 30-39 MIN: ICD-10-PCS | Mod: S$GLB,,, | Performed by: INTERNAL MEDICINE

## 2022-06-23 PROCEDURE — 3078F DIAST BP <80 MM HG: CPT | Mod: CPTII,S$GLB,, | Performed by: INTERNAL MEDICINE

## 2022-06-23 PROCEDURE — 1160F RVW MEDS BY RX/DR IN RCRD: CPT | Mod: CPTII,S$GLB,, | Performed by: INTERNAL MEDICINE

## 2022-06-23 PROCEDURE — 99999 PR PBB SHADOW E&M-EST. PATIENT-LVL III: CPT | Mod: PBBFAC,,, | Performed by: INTERNAL MEDICINE

## 2022-06-23 PROCEDURE — 1160F PR REVIEW ALL MEDS BY PRESCRIBER/CLIN PHARMACIST DOCUMENTED: ICD-10-PCS | Mod: CPTII,S$GLB,, | Performed by: INTERNAL MEDICINE

## 2022-06-23 PROCEDURE — 1157F ADVNC CARE PLAN IN RCRD: CPT | Mod: CPTII,S$GLB,, | Performed by: INTERNAL MEDICINE

## 2022-06-23 PROCEDURE — 1101F PT FALLS ASSESS-DOCD LE1/YR: CPT | Mod: CPTII,S$GLB,, | Performed by: INTERNAL MEDICINE

## 2022-06-23 PROCEDURE — 3288F FALL RISK ASSESSMENT DOCD: CPT | Mod: CPTII,S$GLB,, | Performed by: INTERNAL MEDICINE

## 2022-06-23 PROCEDURE — 99999 PR PBB SHADOW E&M-EST. PATIENT-LVL III: ICD-10-PCS | Mod: PBBFAC,,, | Performed by: INTERNAL MEDICINE

## 2022-06-23 PROCEDURE — 3288F PR FALLS RISK ASSESSMENT DOCUMENTED: ICD-10-PCS | Mod: CPTII,S$GLB,, | Performed by: INTERNAL MEDICINE

## 2022-06-23 PROCEDURE — 1159F PR MEDICATION LIST DOCUMENTED IN MEDICAL RECORD: ICD-10-PCS | Mod: CPTII,S$GLB,, | Performed by: INTERNAL MEDICINE

## 2022-06-23 PROCEDURE — 1159F MED LIST DOCD IN RCRD: CPT | Mod: CPTII,S$GLB,, | Performed by: INTERNAL MEDICINE

## 2022-06-23 PROCEDURE — 1157F PR ADVANCE CARE PLAN OR EQUIV PRESENT IN MEDICAL RECORD: ICD-10-PCS | Mod: CPTII,S$GLB,, | Performed by: INTERNAL MEDICINE

## 2022-06-23 PROCEDURE — 3074F SYST BP LT 130 MM HG: CPT | Mod: CPTII,S$GLB,, | Performed by: INTERNAL MEDICINE

## 2022-06-23 PROCEDURE — 3074F PR MOST RECENT SYSTOLIC BLOOD PRESSURE < 130 MM HG: ICD-10-PCS | Mod: CPTII,S$GLB,, | Performed by: INTERNAL MEDICINE

## 2022-06-23 PROCEDURE — 1126F AMNT PAIN NOTED NONE PRSNT: CPT | Mod: CPTII,S$GLB,, | Performed by: INTERNAL MEDICINE

## 2022-06-23 PROCEDURE — 1101F PR PT FALLS ASSESS DOC 0-1 FALLS W/OUT INJ PAST YR: ICD-10-PCS | Mod: CPTII,S$GLB,, | Performed by: INTERNAL MEDICINE

## 2022-06-23 PROCEDURE — 3078F PR MOST RECENT DIASTOLIC BLOOD PRESSURE < 80 MM HG: ICD-10-PCS | Mod: CPTII,S$GLB,, | Performed by: INTERNAL MEDICINE

## 2022-06-23 NOTE — PROGRESS NOTES
PATIENT: Rex Song  MRN: [id number]  DATE: 6/23/2022    Chief Complaint:  Iron deficiency anemia    Subjective:     History of Present Illness:     Presented to the hospital 04/10/2022 with severe anemia, hemoglobin 3.8.  Was found to have dark stools.    Colonoscopy April 12, 2022 showed a 10 mm polyp in the splenic flexure otherwise unremarkable colonoscopy.  Video capsule endoscopy recommended.    Upper endoscopy April 11, 2022 showed a low-grade narrowing Schatzki ring in the lower 3rd of the esophagus, no source of bleeding noted otherwise.    Seeing Dr. Dominique for follow-up on 04/27/2022     VCE 5/4/22  - Likely adenomatous polyp involving the ampulla with tissue irregularity suspicious for pre-malignant growth   - Otherwise unrevealing VCE in a patient with recurrent, severe anemia attributed to GI blood loss (etiology unclear). This patient's anemia is likely related to chronic GI blood loss from one or more intestinal angioectasias which are unseen by EGD, colonoscopy and VCE. However, bleeding from the ampullary lesion is fairly likely and the ampullary lesion should be evaluated for resection due to concern for malignant transforation   Recommendation: - Refer to advanced endoscopy to discuss     Past Medical, Surgical, Family and Social History Reviewed.    Medications and Allergies reviewed    Review of Systems   Constitutional: Positive for fatigue. Negative for fever and unexpected weight change.       Objective:     Vitals:    06/23/22 1456   BP: 118/62   BP Location: Left arm   Patient Position: Sitting   BP Method: Medium (Automatic)   Pulse: (!) 51   Resp: 16   SpO2: (!) 93%   Weight: 62.8 kg (138 lb 7.2 oz)       BMI: Body mass index is 23.04 kg/m².    Physical Exam  Vitals and nursing note reviewed.   Constitutional:       General: He is not in acute distress.  Pulmonary:      Effort: Pulmonary effort is normal.      Breath sounds: Normal breath sounds.   Neurological:      Mental Status: He  is alert. Mental status is at baseline.         Assessment:       1. Other iron deficiency anemias    2. Acute blood loss anemia        Plan:   Acute blood loss anemia  -reviewed CBC, CMP, ferritin, iron saturation  -severe iron deficiency anemia secondary to blood loss noted  -EGD and colonoscopy unremarkable  -source of blood loss unclear, will need video capsule endoscopy, will be seeing Dr. Dominique next week  -will replete iron stores, scheduled for Venofer weekly x4 doses  -check CBC, CMP, ferritin, iron saturation in 2 months, sooner if needed    Adenomatous polyp involving the ampulla  -noted on video capsule endoscopy  -patient and wife aware of this  -there awaiting appointment with advanced GI

## 2022-07-20 ENCOUNTER — HOSPITAL ENCOUNTER (OUTPATIENT)
Dept: CARDIOLOGY | Facility: HOSPITAL | Age: 77
Discharge: HOME | End: 2022-07-20
Attending: INTERNAL MEDICINE
Payer: MEDICARE

## 2022-07-20 VITALS — WEIGHT: 138 LBS | HEIGHT: 65 IN | BODY MASS INDEX: 22.99 KG/M2

## 2022-07-20 DIAGNOSIS — I25.10 CORONARY ARTERY DISEASE INVOLVING NATIVE CORONARY ARTERY WITHOUT ANGINA PECTORIS, UNSPECIFIED WHETHER NATIVE OR TRANSPLANTED HEART: ICD-10-CM

## 2022-07-20 LAB
AORTIC ROOT ANNULUS: 3.51 CM
AV INDEX (PROSTH): 0.93
AV MEAN GRADIENT: 3 MMHG
AV PEAK GRADIENT: 7 MMHG
AV VALVE AREA: 3.02 CM2
AV VELOCITY RATIO: 0.95
BSA FOR ECHO PROCEDURE: 1.69 M2
CV ECHO LV RWT: 0.47 CM
DOP CALC AO PEAK VEL: 1.33 M/S
DOP CALC AO VTI: 25.63 CM
DOP CALC LVOT AREA: 3.2 CM2
DOP CALC LVOT DIAMETER: 2.03 CM
DOP CALC LVOT PEAK VEL: 1.26 M/S
DOP CALC LVOT STROKE VOLUME: 77.48 CM3
DOP CALC MV VTI: 30.42 CM
DOP CALCLVOT PEAK VEL VTI: 23.95 CM
E WAVE DECELERATION TIME: 330.15 MSEC
E/A RATIO: 0.88
E/E' RATIO: 10.62 M/S
ECHO LV POSTERIOR WALL: 1.25 CM (ref 0.6–1.1)
EJECTION FRACTION: 35 %
FRACTIONAL SHORTENING: 17 % (ref 28–44)
INTERVENTRICULAR SEPTUM: 1.08 CM (ref 0.6–1.1)
LA MAJOR: 5.89 CM
LA MINOR: 6.45 CM
LA WIDTH: 3.67 CM
LEFT ATRIUM SIZE: 3.83 CM
LEFT ATRIUM VOLUME INDEX MOD: 25 ML/M2
LEFT ATRIUM VOLUME INDEX: 43.5 ML/M2
LEFT ATRIUM VOLUME MOD: 42.3 CM3
LEFT ATRIUM VOLUME: 73.57 CM3
LEFT INTERNAL DIMENSION IN SYSTOLE: 4.39 CM (ref 2.1–4)
LEFT VENTRICLE DIASTOLIC VOLUME INDEX: 80.42 ML/M2
LEFT VENTRICLE DIASTOLIC VOLUME: 135.91 ML
LEFT VENTRICLE MASS INDEX: 146 G/M2
LEFT VENTRICLE SYSTOLIC VOLUME INDEX: 51.7 ML/M2
LEFT VENTRICLE SYSTOLIC VOLUME: 87.32 ML
LEFT VENTRICULAR INTERNAL DIMENSION IN DIASTOLE: 5.31 CM (ref 3.5–6)
LEFT VENTRICULAR MASS: 247.06 G
LV LATERAL E/E' RATIO: 8.63 M/S
LV SEPTAL E/E' RATIO: 13.8 M/S
MV MEAN GRADIENT: 0 MMHG
MV PEAK A VEL: 0.78 M/S
MV PEAK E VEL: 0.69 M/S
MV PEAK GRADIENT: 2 MMHG
MV STENOSIS PRESSURE HALF TIME: 101.18 MS
MV VALVE AREA BY CONTINUITY EQUATION: 2.55 CM2
MV VALVE AREA P 1/2 METHOD: 2.17 CM2
PISA MRMAX VEL: 0.03 M/S
PISA TR MAX VEL: 1.96 M/S
PV PEAK VELOCITY: 0.9 CM/S
RA MAJOR: 5.34 CM
RA PRESSURE: 8 MMHG
RA WIDTH: 4.27 CM
RIGHT VENTRICULAR END-DIASTOLIC DIMENSION: 2.62 CM
TDI LATERAL: 0.08 M/S
TDI SEPTAL: 0.05 M/S
TDI: 0.07 M/S
TR MAX PG: 15 MMHG
TRICUSPID ANNULAR PLANE SYSTOLIC EXCURSION: 1.61 CM
TV REST PULMONARY ARTERY PRESSURE: 23 MMHG

## 2022-07-20 PROCEDURE — 93306 TTE W/DOPPLER COMPLETE: CPT | Mod: 26,,, | Performed by: INTERNAL MEDICINE

## 2022-07-20 PROCEDURE — 93306 TTE W/DOPPLER COMPLETE: CPT

## 2022-07-20 PROCEDURE — 93306 ECHO (CUPID ONLY): ICD-10-PCS | Mod: 26,,, | Performed by: INTERNAL MEDICINE

## 2022-08-03 NOTE — PROGRESS NOTES
Please call Mr Song re echocardiogram with pumping function of heart at 35%. Please check BP. Will titrate medication as room on BP. FUP in clinic 1 month to titrate medication. Repeat echo 3 month after medication titration

## 2022-08-09 ENCOUNTER — TELEPHONE (OUTPATIENT)
Dept: CARDIOLOGY | Facility: CLINIC | Age: 77
End: 2022-08-09
Payer: MEDICARE

## 2022-08-09 DIAGNOSIS — N18.4 STAGE 4 CHRONIC KIDNEY DISEASE: Primary | ICD-10-CM

## 2022-08-09 NOTE — TELEPHONE ENCOUNTER
Called the pt in regards to this message.   The pt did not answer  Could not leave voice message  Mailbox not set up      Scheduled follow up appt with Dr. Jones on Thursday 09-15-22  Mailed appt reminder to the pt        ND

## 2022-08-31 ENCOUNTER — CLINICAL SUPPORT (OUTPATIENT)
Dept: CARDIOLOGY | Facility: HOSPITAL | Age: 77
End: 2022-08-31
Attending: INTERNAL MEDICINE
Payer: MEDICARE

## 2022-08-31 DIAGNOSIS — I48.91 ATRIAL FIBRILLATION, UNSPECIFIED TYPE: ICD-10-CM

## 2022-08-31 PROCEDURE — 93227 HOLTER MONITOR - 24 HOUR (CUPID ONLY): ICD-10-PCS | Mod: ,,, | Performed by: STUDENT IN AN ORGANIZED HEALTH CARE EDUCATION/TRAINING PROGRAM

## 2022-08-31 PROCEDURE — 93227 XTRNL ECG REC<48 HR R&I: CPT | Mod: ,,, | Performed by: STUDENT IN AN ORGANIZED HEALTH CARE EDUCATION/TRAINING PROGRAM

## 2022-08-31 PROCEDURE — 93225 XTRNL ECG REC<48 HRS REC: CPT

## 2022-09-01 LAB
OHS CV EVENT MONITOR DAY: 0
OHS CV HOLTER LENGTH DECIMAL HOURS: 24
OHS CV HOLTER LENGTH HOURS: 24
OHS CV HOLTER LENGTH MINUTES: 0
OHS CV HOLTER SINUS AVERAGE HR: 57
OHS CV HOLTER SINUS MAX HR: 84
OHS CV HOLTER SINUS MIN HR: 43

## 2022-09-06 ENCOUNTER — TELEPHONE (OUTPATIENT)
Dept: ELECTROPHYSIOLOGY | Facility: CLINIC | Age: 77
End: 2022-09-06
Payer: MEDICARE

## 2022-09-06 NOTE — TELEPHONE ENCOUNTER
Attempted to contact patient to provide holter monitor results but no answer received at home or mobile number. Voicemail left at home number advising that holter results were normal and instructed to return call to confirm receipt and understanding of information. No voicemail available on mobile number.

## 2022-09-09 ENCOUNTER — TELEPHONE (OUTPATIENT)
Dept: ELECTROPHYSIOLOGY | Facility: CLINIC | Age: 77
End: 2022-09-09
Payer: MEDICARE

## 2022-09-09 NOTE — TELEPHONE ENCOUNTER
----- Message from Silvina Valdez MA sent at 9/6/2022  8:14 AM CDT -----    ----- Message -----  From: Nemesio Raza MD  Sent: 9/2/2022   5:11 PM CDT  To: Ry FREDERICK Staff    Please notify the patient that his monitor showed he was in normal heart rhythm

## 2022-09-09 NOTE — TELEPHONE ENCOUNTER
Attempted to contact patient to discuss holter  monitor results, but no answer received at home or mobile number listed. Voicemail left at home number advising patient that Dr Raza reviewed his holter monitor results which were good and showed that his heart was in a normal rhythm. Instructed to contact the office to confirm receipt of information and if he has any questions about the information provided.    Cyclophosphamide Pregnancy And Lactation Text: This medication is Pregnancy Category D and it isn't considered safe during pregnancy. This medication is excreted in breast milk.

## 2022-09-12 ENCOUNTER — TELEPHONE (OUTPATIENT)
Dept: ELECTROPHYSIOLOGY | Facility: CLINIC | Age: 77
End: 2022-09-12
Payer: MEDICARE

## 2022-09-15 ENCOUNTER — TELEPHONE (OUTPATIENT)
Dept: HEMATOLOGY/ONCOLOGY | Facility: CLINIC | Age: 77
End: 2022-09-15
Payer: MEDICARE

## 2022-09-15 NOTE — TELEPHONE ENCOUNTER
RE: Reschedule Appointment Call     Attempted to call patient regarding need to reschedule their appointment with Dr. Nunez. However, no answer. Staff message sent to contact patient for rescheduling.  Unable to LVM due to mailbox not set up.     Future Appointments   Date Time Provider Department Center   10/27/2022  3:20 PM Roberth Nunez MD [facility] HEM ONC [facility]   11/11/2022  1:00 PM CHAIR [address] [place] [address] CHEMO [facility]   12/7/2022  1:00 PM Jana Marrero MD [facility]       Call time: 1 minutes

## 2022-09-20 ENCOUNTER — TELEPHONE (OUTPATIENT)
Dept: HEMATOLOGY/ONCOLOGY | Facility: CLINIC | Age: 77
End: 2022-09-20
Payer: MEDICARE

## 2022-09-20 NOTE — TELEPHONE ENCOUNTER
RE: Reschedule Appointment Call     X3 attempts to call patient regarding need to reschedule their appointment with Dr. Nunez. However, no answer. Unable to leave voice message due to mailbox not set up. Staff message sent.     Future Appointments   Date Time Provider Department Center   9/22/2022  2:40 PM Reshma Jones MD [facility] CARDIO [facility]   10/12/2022  8:00 AM EKG, APPT [facility]   10/12/2022  8:30 AM Farhana Gonzales NP [facility] AR[facility]HYTH [facility]   10/24/2022  9:00 AM LAB, [facility] [place]   10/27/2022  3:20 PM Roberth Nunez MD [facility] HEM ONC [facility]       Call time: 1 minutes

## 2022-09-22 ENCOUNTER — OFFICE VISIT (OUTPATIENT)
Dept: CARDIOLOGY | Facility: CLINIC | Age: 77
End: 2022-09-22
Payer: MEDICARE

## 2022-09-22 VITALS
HEART RATE: 58 BPM | BODY MASS INDEX: 22.75 KG/M2 | OXYGEN SATURATION: 94 % | SYSTOLIC BLOOD PRESSURE: 143 MMHG | WEIGHT: 136.69 LBS | DIASTOLIC BLOOD PRESSURE: 64 MMHG

## 2022-09-22 DIAGNOSIS — I71.40 ABDOMINAL AORTIC ANEURYSM (AAA) WITHOUT RUPTURE: ICD-10-CM

## 2022-09-22 DIAGNOSIS — E78.5 HYPERLIPIDEMIA, UNSPECIFIED HYPERLIPIDEMIA TYPE: ICD-10-CM

## 2022-09-22 DIAGNOSIS — I73.9 PAD (PERIPHERAL ARTERY DISEASE): ICD-10-CM

## 2022-09-22 DIAGNOSIS — I48.91 ATRIAL FIBRILLATION, UNSPECIFIED TYPE: ICD-10-CM

## 2022-09-22 DIAGNOSIS — I25.10 CORONARY ARTERY DISEASE INVOLVING NATIVE CORONARY ARTERY WITHOUT ANGINA PECTORIS, UNSPECIFIED WHETHER NATIVE OR TRANSPLANTED HEART: Primary | ICD-10-CM

## 2022-09-22 DIAGNOSIS — I10 ESSENTIAL HYPERTENSION: ICD-10-CM

## 2022-09-22 PROCEDURE — 3078F DIAST BP <80 MM HG: CPT | Mod: CPTII,S$GLB,, | Performed by: INTERNAL MEDICINE

## 2022-09-22 PROCEDURE — 3078F PR MOST RECENT DIASTOLIC BLOOD PRESSURE < 80 MM HG: ICD-10-PCS | Mod: CPTII,S$GLB,, | Performed by: INTERNAL MEDICINE

## 2022-09-22 PROCEDURE — 1157F ADVNC CARE PLAN IN RCRD: CPT | Mod: CPTII,S$GLB,, | Performed by: INTERNAL MEDICINE

## 2022-09-22 PROCEDURE — 1160F RVW MEDS BY RX/DR IN RCRD: CPT | Mod: CPTII,S$GLB,, | Performed by: INTERNAL MEDICINE

## 2022-09-22 PROCEDURE — 99214 OFFICE O/P EST MOD 30 MIN: CPT | Mod: S$GLB,,, | Performed by: INTERNAL MEDICINE

## 2022-09-22 PROCEDURE — 99999 PR PBB SHADOW E&M-EST. PATIENT-LVL III: ICD-10-PCS | Mod: PBBFAC,,, | Performed by: INTERNAL MEDICINE

## 2022-09-22 PROCEDURE — 1159F PR MEDICATION LIST DOCUMENTED IN MEDICAL RECORD: ICD-10-PCS | Mod: CPTII,S$GLB,, | Performed by: INTERNAL MEDICINE

## 2022-09-22 PROCEDURE — 1159F MED LIST DOCD IN RCRD: CPT | Mod: CPTII,S$GLB,, | Performed by: INTERNAL MEDICINE

## 2022-09-22 PROCEDURE — 1160F PR REVIEW ALL MEDS BY PRESCRIBER/CLIN PHARMACIST DOCUMENTED: ICD-10-PCS | Mod: CPTII,S$GLB,, | Performed by: INTERNAL MEDICINE

## 2022-09-22 PROCEDURE — 99499 RISK ADDL DX/OHS AUDIT: ICD-10-PCS | Mod: HCNC,S$GLB,, | Performed by: INTERNAL MEDICINE

## 2022-09-22 PROCEDURE — 99999 PR PBB SHADOW E&M-EST. PATIENT-LVL III: CPT | Mod: PBBFAC,,, | Performed by: INTERNAL MEDICINE

## 2022-09-22 PROCEDURE — 99499 UNLISTED E&M SERVICE: CPT | Mod: HCNC,S$GLB,, | Performed by: INTERNAL MEDICINE

## 2022-09-22 PROCEDURE — 3077F PR MOST RECENT SYSTOLIC BLOOD PRESSURE >= 140 MM HG: ICD-10-PCS | Mod: CPTII,S$GLB,, | Performed by: INTERNAL MEDICINE

## 2022-09-22 PROCEDURE — 99214 PR OFFICE/OUTPT VISIT, EST, LEVL IV, 30-39 MIN: ICD-10-PCS | Mod: S$GLB,,, | Performed by: INTERNAL MEDICINE

## 2022-09-22 PROCEDURE — 1157F PR ADVANCE CARE PLAN OR EQUIV PRESENT IN MEDICAL RECORD: ICD-10-PCS | Mod: CPTII,S$GLB,, | Performed by: INTERNAL MEDICINE

## 2022-09-22 PROCEDURE — 3077F SYST BP >= 140 MM HG: CPT | Mod: CPTII,S$GLB,, | Performed by: INTERNAL MEDICINE

## 2022-09-22 NOTE — PROGRESS NOTES
Cardiology Clinic note    Subjective:   Patient ID:  Rex Song is a 77 y.o. male who presents for CAD, PAD FUP    HPI:   CAD: Denies CP, REBOLLEDO. Denies exertional symptoms    PAD: No pain in the legs. No claudications symptoms (reports arthritis). No ulcers / wounds    Afib: No palpitations, irregular heart beat, syncope or near syncope    HTN: On medications    HLD: Tolerating statin    SH Tobacco quit April 2022  FH No premature CAD    Patient Active Problem List    Diagnosis Date Noted    Other iron deficiency anemias 04/20/2022    Hypothyroidism 04/11/2022    Thrombocytopenia 04/11/2022    GI bleed     Acute blood loss anemia     Anemia     Weakness     Atrial fibrillation 04/10/2022    HFrEF (heart failure with reduced ejection fraction) 04/10/2022    Acute renal injury 04/10/2022    Complex renal cyst 02/07/2018    CKD (chronic kidney disease) stage 4, GFR 15-29 ml/min 06/03/2015    Tobacco abuse 06/03/2015    Screening for colon cancer 11/12/2014    Benign neoplasm of colon 10/29/2014    Colon cancer screening 04/30/2014    Dysphagia 03/24/2014    CAD (coronary artery disease) 11/12/2012      RCA      Post PTCA 11/12/2012      LCX and PDA  stent 08/18/2000       Essential hypertension 11/12/2012    Mixed hyperlipidemia 11/12/2012    AAA (abdominal aortic aneurysm) 11/12/2012    PVD (peripheral vascular disease) 11/12/2012    Multiple sclerosis 11/12/2012       Patient's Medications   New Prescriptions    No medications on file   Previous Medications    AMLODIPINE (NORVASC) 5 MG TABLET    TAKE 1 TABLET EVERY DAY    APIXABAN (ELIQUIS) 5 MG TAB    Take 1 tablet (5 mg total) by mouth 2 (two) times daily.    CHOLECALCIFEROL, VITAMIN D3, (VITAMIN D3) 25 MCG (1,000 UNIT) CAPSULE    Take 1 capsule (1,000 Units total) by mouth once daily.    CLOPIDOGREL (PLAVIX) 75 MG TABLET    TAKE 1 TABLET EVERY DAY    FERROUS SULFATE 325 (65 FE) MG EC TABLET    Take 1 tablet (325 mg total) by mouth once daily.     IRBESARTAN (AVAPRO) 150 MG TABLET    Take 1 tablet (150 mg total) by mouth every evening.    LEVOTHYROXINE (SYNTHROID) 25 MCG TABLET    Take 1 tablet (25 mcg total) by mouth before breakfast.    METOPROLOL SUCCINATE (TOPROL-XL) 50 MG 24 HR TABLET    Take 1 tablet (50 mg total) by mouth once daily.    NITROGLYCERIN (NITROSTAT) 0.4 MG SL TABLET    Place 1 tablet (0.4 mg total) under the tongue every 5 (five) minutes as needed.    OMEPRAZOLE (PRILOSEC) 40 MG CAPSULE    Take 1 capsule (40 mg total) by mouth every morning.    ROSUVASTATIN (CRESTOR) 40 MG TAB    Take 1 tablet (40 mg total) by mouth once daily.   Modified Medications    No medications on file   Discontinued Medications    No medications on file        Review of Systems   Constitutional: Negative for fever.   HENT:  Negative for nosebleeds.    Cardiovascular:  Negative for chest pain, dyspnea on exertion, irregular heartbeat, leg swelling, near-syncope, orthopnea, palpitations, paroxysmal nocturnal dyspnea and syncope.        As above   Respiratory:  Negative for hemoptysis.    Hematologic/Lymphatic: Negative for bleeding problem.   Skin:  Negative for poor wound healing.   Gastrointestinal:  Negative for hematochezia.   Genitourinary:  Negative for hematuria.   Neurological:  Negative for light-headedness.   Allergic/Immunologic: Negative for persistent infections.       Objective:   Vitals  Vitals:    09/22/22 1511   BP: (!) 143/64   Pulse: (!) 58   SpO2: (!) 94%   Weight: 62 kg (136 lb 11 oz)          Physical Exam  Constitutional:       General: He is not in acute distress.     Appearance: He is well-developed. He is not diaphoretic.   HENT:      Head: Normocephalic.   Neck:      Vascular: No JVD.   Cardiovascular:      Rate and Rhythm: Normal rate. Rhythm irregular.      Heart sounds: No murmur heard.    No friction rub. No gallop.   Pulmonary:      Effort: Pulmonary effort is normal. No respiratory distress.      Breath sounds: Normal breath sounds.    Abdominal:      Palpations: Abdomen is soft.      Tenderness: There is no abdominal tenderness.   Musculoskeletal:         General: No swelling.      Cervical back: Normal range of motion.   Skin:     General: Skin is warm.   Neurological:      Mental Status: He is alert.   Psychiatric:         Mood and Affect: Mood normal.           Assessment:     1. Coronary artery disease involving native coronary artery without angina pectoris, unspecified whether native or transplanted heart    2. PAD (peripheral artery disease)    3. Atrial fibrillation, unspecified type    4. Essential hypertension    5. Hyperlipidemia, unspecified hyperlipidemia type    6. Abdominal aortic aneurysm (AAA) without rupture        Plan:   Rex Song is a 77 y.o. male h/o CAD, PAD, HTN, HLD  H/o TIA, stroke    Accompanied by wife Jessica    Adm April 2022 for symptomatic anemia 2/2 melena. 3 units PRBCs. EGD and colonoscopy with with no source of bleeding. OP video capsule study recommended. Discharge on clopidogrel with apixaban. Repeat OP coloscopy for polyp removal.    - EKG personally reviewed. My interpretation:  6/16/22: Afib HR 90s, IVCD. IL STD. Qtc 528  - Holter August 2022  Baseline rhythm was sinus bradycardia with normal intervals and an average heart rate of 57 bpm.  There were no reported symptoms.  There were rare PVCs with an overall PVC burden of 0.2%. There were rare PACs.  There were periods of second degree AV block, Mobitz type I (Wenckebach) noted with sleep.  There were no concerning atrial or ventricular arrhythmias.    CAD  - On review of prior notes:  - sp NSTEMI 8/18/2000, sp stent LCx (x2?), PDA. Distal LAD 90% stenosis, ostial D2 90%, proximal LCx 90%, mid LCx 90%. Proximal PDA subtotal. Proximal RCA occluded  - Regadenoson nuclear stress 2015: Negative for ischemia (EKG, Nuc)  - Clopidogrel, statin    PAD  - GONZALO at rest: R-0.68, L-0.68. Exercise R-0.37, L-0.31  - US 2016: There is an Abdominal Aortic Aneurysm  largest at the infrarenal level measuring 3.74 cm. There is an accelerated PSV at the right common iliac level of 224 cm/s indicating a possible stenosis.   - Clopidogrel, statin  - Cilostazol held d/t recent GIB    Afib, Atrial flutter  - Afib (sp DCCV Sept 2021)- converted to SR with amiodarone. Atrial flutter noted April 2022. Afib EKG June 2022. Discussed DCCV and EP referral.  - GI work up May 2022:  - Likely adenomatous polyp involving the ampulla with tissue irregularity suspicious for pre-malignant growth  - Referred to advanced endoscopy to discuss r/b EUS, ampullectomy  - CHADS-VASc 5 (HTN, Age x2, h/o stroke, CAD)  - HAS-BLED 3 (Age, clopidogrel, h/o stroke)  - Apixaban 5 mg bid (Age<80y, Cr>1.5, Wt>60 kg)  Lab Results   Component Value Date    CREATININE 1.93 (H) 08/09/2022    AST 32 06/21/2022    ALT 26 06/21/2022   - Metoprolol succinate    CMP  - Echo July 2022  The left ventricle is normal in size with moderately decreased systolic function.  The estimated ejection fraction is 35%.  There is moderate left ventricular global hypokinesis.  Grade I left ventricular diastolic dysfunction.  Mild mitral regurgitation.  Mild tricuspid regurgitation.  Normal right ventricular size with normal right ventricular systolic function.  There is no pulmonary hypertension.  - Echo April 2022  The left ventricle is mildly enlarged with eccentric hypertrophy and mildly decreased systolic function.  The estimated ejection fraction is 45%.  Normal right ventricular size with normal right ventricular systolic function.  Severe left atrial enlargement.  Grade I left ventricular diastolic dysfunction.  Normal central venous pressure (3 mmHg).  The estimated PA systolic pressure is 33 mmHg.  - Echo Aug 2021: LVEF 30%, GHK, DD c/e Afib. Normal RVSF. Mild MR, TR. Mod LAE. No P-HTN  - Echo 2015: LVEF 50%, normal diastology. Normal RVSF. Trivial AI, TR. Low CVP  - Irbesartan, metoprolol succinate  - Echo Nov 2022    AAA  - Abd  US Jan 2022:  Mildly aneurysmal abdominal aorta measuring 3.4 cm, demonstrating some mural thrombus similar to prior exam.  Clinical considerations will determine follow-up.  Based on aneurysm size, consider follow-up in 3 years.  - Abd US 2019: Infrarenal Aortic Aneurysm 3.3 cm  - US 2016: There is an Abdominal Aortic Aneurysm largest at the infrarenal level measuring 3.74 cm. There is an accelerated PSV at the right common iliac level of 224 cm/s indicating a possible stenosis.     HTN  BP well controlled at home  Amlodipine, irbesartan, metoprolol succinate    HLD  Lab Results   Component Value Date    LDLCALC 43.6 (L) 04/11/2022   Statin as above      Continue with current medical plan and lifestyle changes.    Orders Placed This Encounter   Procedures    Echo     Standing Status:   Future     Standing Expiration Date:   9/22/2023     Order Specific Question:   Release to patient     Answer:   Immediate         Follow up as scheduled  Return sooner for concerns or questions. If symptoms persist go to the ED    He expressed verbal understanding and agreed with the plan      Reshma Jones MD  Interventional Cardiology  [facility]  Phone: [phone]

## 2022-09-28 ENCOUNTER — TELEPHONE (OUTPATIENT)
Dept: HEMATOLOGY/ONCOLOGY | Facility: CLINIC | Age: 77
End: 2022-09-28
Payer: MEDICARE

## 2022-09-28 NOTE — TELEPHONE ENCOUNTER
RE: Reschedule Appointment Call     Attempted to call patient today x2 regarding need to reschedule their appointment with Dr. Roberth Nunez MD However, no answer. Call outcome: Left voice message with clinic number for return call on home number [phone].  Multiple attempts (>4 ) to contact patient have been unsuccessful.  Staff message sent to contact patient for rescheduling.        Future Appointments   Date Time Provider Department Center   10/24/2022  9:00 AM LAB, [facility] [place]   10/27/2022  3:20 PM Roberth Nunez MD [facility] [place]       Call time: 2 minutes

## 2022-10-06 NOTE — PROGRESS NOTES
Mr. Song is a patient of Dr. Raza and was last seen in clinic 6/16/2022.      Subjective:   Patient ID:  Rex Song is a 77 y.o. male who presents for follow-up of Atrial Fibrillation and Cardiomyopathy  .     HPI:    Mr. Song is a 77 y.o. male with CAD, EF 45%, HTN, AF, CKD4 here for follow up.     Background:    Mr. Song is a 77 year-old man with coronary artery disease (RCA , PCI to LCX/PDA) with a LVEF of 45%, hypertension, multiple sclerosis, stage 4 chronic kidney disease, blood loss anemia, and atrial fibrillation/flutter despite amiodarone therapy. His AF was originally observed in July of 2021 which persisted. A holter monitor from August 2021 noted rate controlled AF. His EF was observed to decline to 30%. He underwent DCCV in 9/2021 with early recurrence of AF (EF noted to be 25% on KERRY). He was initiated on amiodarone at a follow-up visit with Dr. Jones in November of 2021. He was then observed to be in sinus rhythm in February of 2022. An echocardiogram in March of 2022 noted improvement in his LVEF to 45%. He was admitted to the hospital in April of 2022 with fatigue, anemia, and melena requiring transfusions. EGD/colonoscopy were unrevealing. Video capsule was also unrevealing. His AF recurred at this time for which he presents. He currently has no complaints.     ECGs which show sinus rhythm or AF.  In clinic ECG is AF with an average ventricular rate of 91 bpm.    In summary, Mr. Song  is a pleasant 77 year-old man with coronary artery disease (RCA , PCI to LCX/PDA) with a LVEF of 45%, hypertension, multiple sclerosis, stage 4 chronic kidney disease, blood loss anemia, and atrial fibrillation/flutter despite amiodarone therapy. Discussed concern that his decline in LVEF may have been related to his AF persistence. Discussed trial of DCCV and increasing amiodarone with seeing if he could tolerate sinus bradycardia in the 50s. Discussed PVI as an option. He was not  interested in either of those and reports he would rather remain in AF. Will stop amiodarone, increase metoprolol and get a 24hr holter in 3 months prior to return visit.  Discussed my concern for his long-term tolerability of anticoagulation. Discussed Watchman implantation. We had discussed the process of the procedure and he had no interest in it.     Update (10/12/2022):    HOlter :  Baseline rhythm was sinus bradycardia with normal intervals and an average heart rate of 57 bpm.  There were no reported symptoms.  There were rare PVCs with an overall PVC burden of 0.2%. There were rare PACs.  There were periods of second degree AV block, Mobitz type I (Wenckebach) noted with sleep.  There were no concerning atrial or ventricular arrhythmias.    Today he says he feels well. No cardiac complaints. Mr. Song reports no chest pain with exertion or at rest, palpitations, SOB, REBOLLEDO, dizziness, or syncope.    He is currently taking eliquis 5mg BID for stroke prophylaxis and denies significant bleeding episodes. He is currently being treated with toprol 50mg daily for HR control. Kidney function is low, with a creatinine of 1.9 on 8/9/2022.    I have personally reviewed the patient's EKG today, which shows sinus rhythm at 64bpm. OR is 204. QRS is 104. QTc is 449.    Relevant Cardiac Test Results:    2D Echo (7/20/2022):  The left ventricle is normal in size with moderately decreased systolic function.  The estimated ejection fraction is 35%.  There is moderate left ventricular global hypokinesis.  Grade I left ventricular diastolic dysfunction.  Mild mitral regurgitation.  Mild tricuspid regurgitation.  Normal right ventricular size with normal right ventricular systolic function.  There is no pulmonary hypertension.    Current Outpatient Medications   Medication Sig    amLODIPine (NORVASC) 5 MG tablet TAKE 1 TABLET EVERY DAY (Patient taking differently: Take 5 mg by mouth once daily.)    apixaban (ELIQUIS) 5 mg Tab  "Take 1 tablet (5 mg total) by mouth 2 (two) times daily.    cholecalciferol, vitamin D3, (VITAMIN D3) 25 mcg (1,000 unit) capsule Take 1 capsule (1,000 Units total) by mouth once daily.    clopidogreL (PLAVIX) 75 mg tablet TAKE 1 TABLET EVERY DAY    ferrous sulfate 325 (65 FE) MG EC tablet Take 1 tablet (325 mg total) by mouth once daily.    irbesartan (AVAPRO) 150 MG tablet Take 1 tablet (150 mg total) by mouth every evening.    levothyroxine (SYNTHROID) 25 MCG tablet Take 1 tablet (25 mcg total) by mouth before breakfast.    metoprolol succinate (TOPROL-XL) 50 MG 24 hr tablet Take 1 tablet (50 mg total) by mouth once daily.    nitroGLYCERIN (NITROSTAT) 0.4 MG SL tablet Place 1 tablet (0.4 mg total) under the tongue every 5 (five) minutes as needed.    omeprazole (PRILOSEC) 40 MG capsule Take 1 capsule (40 mg total) by mouth every morning.    rosuvastatin (CRESTOR) 40 MG Tab Take 1 tablet (40 mg total) by mouth once daily.     No current facility-administered medications for this visit.       Review of Systems   Constitutional: Negative for malaise/fatigue.   Cardiovascular:  Negative for chest pain, dyspnea on exertion, irregular heartbeat, leg swelling and palpitations.   Respiratory:  Negative for shortness of breath.    Hematologic/Lymphatic: Negative for bleeding problem.   Skin:  Negative for rash.   Musculoskeletal:  Negative for myalgias.   Gastrointestinal:  Negative for hematemesis, hematochezia and nausea.   Genitourinary:  Negative for hematuria.   Neurological:  Negative for light-headedness.   Psychiatric/Behavioral:  Negative for altered mental status.    Allergic/Immunologic: Negative for persistent infections.     Objective:          BP (!) 145/63   Pulse 64   Ht 5' 6" (1.676 m)   Wt 61.9 kg (136 lb 7.4 oz)   BMI 22.03 kg/m²     Physical Exam  Vitals and nursing note reviewed.   Constitutional:       Appearance: Normal appearance. He is well-developed.   HENT:      Head: Normocephalic.      " Nose: Nose normal.   Eyes:      Pupils: Pupils are equal, round, and reactive to light.   Cardiovascular:      Rate and Rhythm: Normal rate and regular rhythm.   Pulmonary:      Effort: No respiratory distress.      Breath sounds: Normal breath sounds.   Musculoskeletal:         General: Normal range of motion.   Skin:     General: Skin is warm and dry.      Findings: No erythema.   Neurological:      Mental Status: He is alert and oriented to person, place, and time.   Psychiatric:         Speech: Speech normal.         Behavior: Behavior normal.         Lab Results   Component Value Date     08/09/2022    K 3.7 08/09/2022    MG 2.3 08/09/2022    BUN 17 08/09/2022    CREATININE 1.93 (H) 08/09/2022    ALT 26 06/21/2022    AST 32 06/21/2022    HGB 12.6 (L) 08/09/2022    HCT 41.2 08/09/2022    TSH 23.839 (H) 04/11/2022    LDLCALC 43.6 (L) 04/11/2022           Assessment:     1. Paroxysmal atrial fibrillation    2. Essential hypertension    3. HFrEF (heart failure with reduced ejection fraction)    4. CKD (chronic kidney disease) stage 4, GFR 15-29 ml/min      Plan:     In summary, Mr. Song is a 77 y.o. male with CAD, EF 45%, HTN, AF, CKD4 here for follow up.   Stable from a rhythm standpoint. In SR on Holter monitor last month and in SR today. No longer on amiodarone.  Last echo showed decrease in EF from 45% to 35% despite lack of AF seen. PET stress to check for new ischemia given CAD hx. (Last ischemic eval 2015)  Echo already scheduled for end of next month - if EF <35%, obtain longer monitor and discuss ICD if monitor continues to show SR.  He is tolerating OAC at this time. He feels well with no cardiac complaints.     PET stress  Continue current meds  RTC 3 mo, sooner if needed    *A copy of this note has been sent to Dr. Raza*    Follow up in about 3 months (around 1/12/2023).    ------------------------------------------------------------------    LOUANN Evans, NP-C  Cardiac  Electrophysiology

## 2022-10-12 ENCOUNTER — HOSPITAL ENCOUNTER (OUTPATIENT)
Dept: CARDIOLOGY | Facility: CLINIC | Age: 77
Discharge: HOME | End: 2022-10-12
Payer: MEDICARE

## 2022-10-12 ENCOUNTER — OFFICE VISIT (OUTPATIENT)
Dept: ELECTROPHYSIOLOGY | Facility: CLINIC | Age: 77
End: 2022-10-12
Payer: MEDICARE

## 2022-10-12 VITALS
SYSTOLIC BLOOD PRESSURE: 145 MMHG | DIASTOLIC BLOOD PRESSURE: 63 MMHG | HEIGHT: 66 IN | WEIGHT: 136.44 LBS | BODY MASS INDEX: 21.93 KG/M2 | HEART RATE: 64 BPM

## 2022-10-12 DIAGNOSIS — I48.91 ATRIAL FIBRILLATION, UNSPECIFIED TYPE: ICD-10-CM

## 2022-10-12 DIAGNOSIS — N18.4 CKD (CHRONIC KIDNEY DISEASE) STAGE 4, GFR 15-29 ML/MIN: Chronic | ICD-10-CM

## 2022-10-12 DIAGNOSIS — I10 ESSENTIAL HYPERTENSION: Chronic | ICD-10-CM

## 2022-10-12 DIAGNOSIS — I50.20 HFREF (HEART FAILURE WITH REDUCED EJECTION FRACTION): Chronic | ICD-10-CM

## 2022-10-12 DIAGNOSIS — I48.0 PAROXYSMAL ATRIAL FIBRILLATION: Primary | Chronic | ICD-10-CM

## 2022-10-12 PROCEDURE — 93005 ELECTROCARDIOGRAM TRACING: CPT | Mod: S$GLB,,, | Performed by: INTERNAL MEDICINE

## 2022-10-12 PROCEDURE — 1160F RVW MEDS BY RX/DR IN RCRD: CPT | Mod: CPTII,S$GLB,, | Performed by: NURSE PRACTITIONER

## 2022-10-12 PROCEDURE — 99999 PR PBB SHADOW E&M-EST. PATIENT-LVL III: CPT | Mod: PBBFAC,,, | Performed by: NURSE PRACTITIONER

## 2022-10-12 PROCEDURE — 1126F PR PAIN SEVERITY QUANTIFIED, NO PAIN PRESENT: ICD-10-PCS | Mod: CPTII,S$GLB,, | Performed by: NURSE PRACTITIONER

## 2022-10-12 PROCEDURE — 99214 OFFICE O/P EST MOD 30 MIN: CPT | Mod: S$GLB,,, | Performed by: NURSE PRACTITIONER

## 2022-10-12 PROCEDURE — 1160F PR REVIEW ALL MEDS BY PRESCRIBER/CLIN PHARMACIST DOCUMENTED: ICD-10-PCS | Mod: CPTII,S$GLB,, | Performed by: NURSE PRACTITIONER

## 2022-10-12 PROCEDURE — 1159F MED LIST DOCD IN RCRD: CPT | Mod: CPTII,S$GLB,, | Performed by: NURSE PRACTITIONER

## 2022-10-12 PROCEDURE — 3078F PR MOST RECENT DIASTOLIC BLOOD PRESSURE < 80 MM HG: ICD-10-PCS | Mod: CPTII,S$GLB,, | Performed by: NURSE PRACTITIONER

## 2022-10-12 PROCEDURE — 3288F PR FALLS RISK ASSESSMENT DOCUMENTED: ICD-10-PCS | Mod: CPTII,S$GLB,, | Performed by: NURSE PRACTITIONER

## 2022-10-12 PROCEDURE — 99214 PR OFFICE/OUTPT VISIT, EST, LEVL IV, 30-39 MIN: ICD-10-PCS | Mod: S$GLB,,, | Performed by: NURSE PRACTITIONER

## 2022-10-12 PROCEDURE — 99499 UNLISTED E&M SERVICE: CPT | Mod: HCNC,S$GLB,, | Performed by: NURSE PRACTITIONER

## 2022-10-12 PROCEDURE — 1126F AMNT PAIN NOTED NONE PRSNT: CPT | Mod: CPTII,S$GLB,, | Performed by: NURSE PRACTITIONER

## 2022-10-12 PROCEDURE — 1157F PR ADVANCE CARE PLAN OR EQUIV PRESENT IN MEDICAL RECORD: ICD-10-PCS | Mod: CPTII,S$GLB,, | Performed by: NURSE PRACTITIONER

## 2022-10-12 PROCEDURE — 93010 ELECTROCARDIOGRAM REPORT: CPT | Mod: S$GLB,,, | Performed by: INTERNAL MEDICINE

## 2022-10-12 PROCEDURE — 99999 PR PBB SHADOW E&M-EST. PATIENT-LVL III: ICD-10-PCS | Mod: PBBFAC,,, | Performed by: NURSE PRACTITIONER

## 2022-10-12 PROCEDURE — 1101F PR PT FALLS ASSESS DOC 0-1 FALLS W/OUT INJ PAST YR: ICD-10-PCS | Mod: CPTII,S$GLB,, | Performed by: NURSE PRACTITIONER

## 2022-10-12 PROCEDURE — 93010 RHYTHM STRIP: ICD-10-PCS | Mod: S$GLB,,, | Performed by: INTERNAL MEDICINE

## 2022-10-12 PROCEDURE — 93005 RHYTHM STRIP: ICD-10-PCS | Mod: S$GLB,,, | Performed by: INTERNAL MEDICINE

## 2022-10-12 PROCEDURE — 1157F ADVNC CARE PLAN IN RCRD: CPT | Mod: CPTII,S$GLB,, | Performed by: NURSE PRACTITIONER

## 2022-10-12 PROCEDURE — 3288F FALL RISK ASSESSMENT DOCD: CPT | Mod: CPTII,S$GLB,, | Performed by: NURSE PRACTITIONER

## 2022-10-12 PROCEDURE — 3077F SYST BP >= 140 MM HG: CPT | Mod: CPTII,S$GLB,, | Performed by: NURSE PRACTITIONER

## 2022-10-12 PROCEDURE — 3077F PR MOST RECENT SYSTOLIC BLOOD PRESSURE >= 140 MM HG: ICD-10-PCS | Mod: CPTII,S$GLB,, | Performed by: NURSE PRACTITIONER

## 2022-10-12 PROCEDURE — 1159F PR MEDICATION LIST DOCUMENTED IN MEDICAL RECORD: ICD-10-PCS | Mod: CPTII,S$GLB,, | Performed by: NURSE PRACTITIONER

## 2022-10-12 PROCEDURE — 3078F DIAST BP <80 MM HG: CPT | Mod: CPTII,S$GLB,, | Performed by: NURSE PRACTITIONER

## 2022-10-12 PROCEDURE — 1101F PT FALLS ASSESS-DOCD LE1/YR: CPT | Mod: CPTII,S$GLB,, | Performed by: NURSE PRACTITIONER

## 2022-10-31 ENCOUNTER — TELEPHONE (OUTPATIENT)
Dept: CARDIOLOGY | Facility: HOSPITAL | Age: 77
End: 2022-10-31
Payer: MEDICARE

## 2022-11-02 ENCOUNTER — HOSPITAL ENCOUNTER (OUTPATIENT)
Dept: CARDIOLOGY | Facility: HOSPITAL | Age: 77
Discharge: HOME | End: 2022-11-02
Attending: NURSE PRACTITIONER
Payer: MEDICARE

## 2022-11-02 ENCOUNTER — TELEPHONE (OUTPATIENT)
Dept: ELECTROPHYSIOLOGY | Facility: CLINIC | Age: 77
End: 2022-11-02
Payer: MEDICARE

## 2022-11-02 VITALS
SYSTOLIC BLOOD PRESSURE: 109 MMHG | HEART RATE: 67 BPM | WEIGHT: 136 LBS | DIASTOLIC BLOOD PRESSURE: 48 MMHG | RESPIRATION RATE: 16 BRPM | BODY MASS INDEX: 21.86 KG/M2 | HEIGHT: 66 IN

## 2022-11-02 DIAGNOSIS — I48.0 PAROXYSMAL ATRIAL FIBRILLATION: Chronic | ICD-10-CM

## 2022-11-02 DIAGNOSIS — I50.20 HFREF (HEART FAILURE WITH REDUCED EJECTION FRACTION): Chronic | ICD-10-CM

## 2022-11-02 DIAGNOSIS — R94.39 ABNORMAL STRESS TEST: Primary | ICD-10-CM

## 2022-11-02 LAB
CFR FLOW - ANTERIOR: 2.15
CFR FLOW - INFERIOR: 1.76
CFR FLOW - LATERAL: 1.9
CFR FLOW - MAX: 2.54
CFR FLOW - MIN: 1.41
CFR FLOW - SEPTAL: 2.15
CFR FLOW - WHOLE HEART: 1.99
CV STRESS BASE HR: 54 BPM
DIASTOLIC BLOOD PRESSURE: 67 MMHG
EJECTION FRACTION- HIGH: 65 %
END DIASTOLIC INDEX-HIGH: 153 ML/M2
END DIASTOLIC INDEX-LOW: 93 ML/M2
END SYSTOLIC INDEX-HIGH: 71 ML/M2
END SYSTOLIC INDEX-LOW: 31 ML/M2
NUC REST DIASTOLIC VOLUME INDEX: 156
NUC REST EJECTION FRACTION: 35
NUC REST SYSTOLIC VOLUME INDEX: 102
NUC STRESS DIASTOLIC VOLUME INDEX: 163
NUC STRESS EJECTION FRACTION: 37 %
NUC STRESS SYSTOLIC VOLUME INDEX: 102
OHS CV CPX 1 MINUTE RECOVERY HEART RATE: 93 BPM
OHS CV CPX 85 PERCENT MAX PREDICTED HEART RATE MALE: 120
OHS CV CPX MAX PREDICTED HEART RATE: 141
OHS CV CPX PATIENT IS FEMALE: 0
OHS CV CPX PATIENT IS MALE: 1
OHS CV CPX PEAK DIASTOLIC BLOOD PRESSURE: 79 MMHG
OHS CV CPX PEAK HEAR RATE: 51 BPM
OHS CV CPX PEAK RATE PRESSURE PRODUCT: 6477
OHS CV CPX PEAK SYSTOLIC BLOOD PRESSURE: 127 MMHG
OHS CV CPX PERCENT MAX PREDICTED HEART RATE ACHIEVED: 36
OHS CV CPX RATE PRESSURE PRODUCT PRESENTING: 6966
PERFUSION DEFECT 1 SIZE IN %: 10 %
PERFUSION DEFECT 2 SIZE IN %: 1 %
PERFUSION DEFECT WORSENS SIZE IN % 2: 5 %
REST FLOW - ANTERIOR: 0.65 CC/MIN/G
REST FLOW - INFERIOR: 0.49 CC/MIN/G
REST FLOW - LATERAL: 0.82 CC/MIN/G
REST FLOW - MAX: 1.05 CC/MIN/G
REST FLOW - MIN: 0.26 CC/MIN/G
REST FLOW - SEPTAL: 0.58 CC/MIN/G
REST FLOW - WHOLE HEART: 0.64 CC/MIN/G
RETIRED EF AND QEF - SEE NOTES: 53 %
STRESS FLOW - ANTERIOR: 1.4 CC/MIN/G
STRESS FLOW - INFERIOR: 0.84 CC/MIN/G
STRESS FLOW - LATERAL: 1.57 CC/MIN/G
STRESS FLOW - MAX: 2.12 CC/MIN/G
STRESS FLOW - MIN: 0.46 CC/MIN/G
STRESS FLOW - SEPTAL: 1.23 CC/MIN/G
STRESS FLOW - WHOLE HEART: 1.26 CC/MIN/G
SYSTOLIC BLOOD PRESSURE: 129 MMHG

## 2022-11-02 PROCEDURE — 63600175 PHARM REV CODE 636 W HCPCS: Performed by: NURSE PRACTITIONER

## 2022-11-02 PROCEDURE — 78434 AQMBF PET REST & RX STRESS: CPT

## 2022-11-02 PROCEDURE — 93016 CARDIAC PET SCAN STRESS (CUPID ONLY): ICD-10-PCS | Mod: ,,, | Performed by: INTERNAL MEDICINE

## 2022-11-02 PROCEDURE — 78434 AQMBF PET REST & RX STRESS: CPT | Mod: 26,,, | Performed by: INTERNAL MEDICINE

## 2022-11-02 PROCEDURE — 93016 CV STRESS TEST SUPVJ ONLY: CPT | Mod: ,,, | Performed by: INTERNAL MEDICINE

## 2022-11-02 PROCEDURE — 93018 CV STRESS TEST I&R ONLY: CPT | Mod: ,,, | Performed by: INTERNAL MEDICINE

## 2022-11-02 PROCEDURE — 93018 CARDIAC PET SCAN STRESS (CUPID ONLY): ICD-10-PCS | Mod: ,,, | Performed by: INTERNAL MEDICINE

## 2022-11-02 PROCEDURE — 78431 CARDIAC PET SCAN STRESS (CUPID ONLY): ICD-10-PCS | Mod: 26,,, | Performed by: INTERNAL MEDICINE

## 2022-11-02 PROCEDURE — 78431 MYOCRD IMG PET RST&STRS CT: CPT | Mod: 26,,, | Performed by: INTERNAL MEDICINE

## 2022-11-02 PROCEDURE — 78434 CARDIAC PET SCAN STRESS (CUPID ONLY): ICD-10-PCS | Mod: 26,,, | Performed by: INTERNAL MEDICINE

## 2022-11-02 RX ORDER — CAFFEINE CITRATE 20 MG/ML
60 SOLUTION INTRAVENOUS ONCE
Status: COMPLETED | OUTPATIENT
Start: 2022-11-02 | End: 2022-11-02

## 2022-11-02 RX ORDER — REGADENOSON 0.08 MG/ML
0.4 INJECTION, SOLUTION INTRAVENOUS ONCE
Status: COMPLETED | OUTPATIENT
Start: 2022-11-02 | End: 2022-11-02

## 2022-11-02 RX ADMIN — CAFFEINE CITRATE 60 MG: 20 INJECTION INTRAVENOUS at 08:11

## 2022-11-02 RX ADMIN — REGADENOSON 0.4 MG: 0.08 INJECTION, SOLUTION INTRAVENOUS at 08:11

## 2022-11-02 NOTE — TELEPHONE ENCOUNTER
Called and spoke with patient's wife about PET stress results. Area of ischemia with underlying infarct - would prefer he see interventional cardiology for further evaluation. She verbalized understanding and will discuss with patient.

## 2022-11-03 DIAGNOSIS — N18.9 CHRONIC KIDNEY DISEASE, UNSPECIFIED CKD STAGE: ICD-10-CM

## 2022-11-03 DIAGNOSIS — I25.10 CORONARY ARTERY DISEASE INVOLVING NATIVE HEART, UNSPECIFIED VESSEL OR LESION TYPE, UNSPECIFIED WHETHER ANGINA PRESENT: Primary | ICD-10-CM

## 2022-11-03 RX ORDER — DIPHENHYDRAMINE HCL 50 MG
50 CAPSULE ORAL ONCE
Status: CANCELLED | OUTPATIENT
Start: 2022-11-03 | End: 2022-11-03

## 2022-11-03 RX ORDER — DEXTROSE MONOHYDRATE AND SODIUM CHLORIDE 5; .45 G/100ML; G/100ML
INJECTION, SOLUTION INTRAVENOUS CONTINUOUS
Status: CANCELLED | OUTPATIENT
Start: 2022-11-03

## 2022-11-07 ENCOUNTER — TELEPHONE (OUTPATIENT)
Dept: INFUSION THERAPY | Facility: HOSPITAL | Age: 77
End: 2022-11-07
Payer: MEDICARE

## 2022-11-16 ENCOUNTER — INFUSION (OUTPATIENT)
Dept: INFUSION THERAPY | Facility: HOSPITAL | Age: 77
End: 2022-11-16
Attending: INTERNAL MEDICINE
Payer: MEDICARE

## 2022-11-16 VITALS
HEART RATE: 67 BPM | RESPIRATION RATE: 18 BRPM | HEIGHT: 66 IN | TEMPERATURE: 98 F | SYSTOLIC BLOOD PRESSURE: 115 MMHG | BODY MASS INDEX: 21.86 KG/M2 | DIASTOLIC BLOOD PRESSURE: 57 MMHG | WEIGHT: 136 LBS | OXYGEN SATURATION: 98 %

## 2022-11-16 DIAGNOSIS — D50.8 OTHER IRON DEFICIENCY ANEMIAS: Primary | ICD-10-CM

## 2022-11-16 PROCEDURE — 25000003 PHARM REV CODE 250: Performed by: INTERNAL MEDICINE

## 2022-11-16 PROCEDURE — A4216 STERILE WATER/SALINE, 10 ML: HCPCS | Performed by: INTERNAL MEDICINE

## 2022-11-16 PROCEDURE — 63600175 PHARM REV CODE 636 W HCPCS: Mod: JG | Performed by: INTERNAL MEDICINE

## 2022-11-16 PROCEDURE — 96365 THER/PROPH/DIAG IV INF INIT: CPT

## 2022-11-16 RX ORDER — HEPARIN 100 UNIT/ML
5 SYRINGE INTRAVENOUS
OUTPATIENT
Start: 2022-11-23

## 2022-11-16 RX ORDER — HEPARIN 100 UNIT/ML
5 SYRINGE INTRAVENOUS
Status: CANCELLED | OUTPATIENT
Start: 2022-11-22

## 2022-11-16 RX ORDER — SODIUM CHLORIDE 0.9 % (FLUSH) 0.9 %
10 SYRINGE (ML) INJECTION
OUTPATIENT
Start: 2022-11-23

## 2022-11-16 RX ORDER — SODIUM CHLORIDE 9 MG/ML
INJECTION, SOLUTION INTRAVENOUS CONTINUOUS
OUTPATIENT
Start: 2022-11-23

## 2022-11-16 RX ORDER — SODIUM CHLORIDE 0.9 % (FLUSH) 0.9 %
10 SYRINGE (ML) INJECTION
Status: DISCONTINUED | OUTPATIENT
Start: 2022-11-16 | End: 2022-11-16 | Stop reason: HOSPADM

## 2022-11-16 RX ORDER — SODIUM CHLORIDE 9 MG/ML
INJECTION, SOLUTION INTRAVENOUS CONTINUOUS
Status: CANCELLED | OUTPATIENT
Start: 2022-11-22

## 2022-11-16 RX ORDER — SODIUM CHLORIDE 0.9 % (FLUSH) 0.9 %
10 SYRINGE (ML) INJECTION
Status: CANCELLED | OUTPATIENT
Start: 2022-11-22

## 2022-11-16 RX ADMIN — Medication 10 ML: at 10:11

## 2022-11-16 RX ADMIN — SODIUM CHLORIDE: 9 INJECTION, SOLUTION INTRAVENOUS at 10:11

## 2022-11-16 RX ADMIN — FERRIC CARBOXYMALTOSE INJECTION 750 MG: 50 INJECTION, SOLUTION INTRAVENOUS at 10:11

## 2022-11-16 NOTE — NURSING
Pt tolerated injectafer 1 of 2 infusion well.  No adverse reaction noted durning or 30 min post infusion observation .   IV flushed with NS and D/C per protocol.  Patient left clinic in no acute distress.

## 2022-11-29 DIAGNOSIS — I50.20 HFREF (HEART FAILURE WITH REDUCED EJECTION FRACTION): Chronic | ICD-10-CM

## 2022-11-29 DIAGNOSIS — I25.119 CORONARY ARTERY DISEASE INVOLVING NATIVE HEART WITH ANGINA PECTORIS, UNSPECIFIED VESSEL OR LESION TYPE: Primary | Chronic | ICD-10-CM

## 2022-11-29 DIAGNOSIS — I25.119 CORONARY ARTERY DISEASE INVOLVING NATIVE HEART WITH ANGINA PECTORIS, UNSPECIFIED VESSEL OR LESION TYPE: ICD-10-CM

## 2022-11-29 DIAGNOSIS — R94.39 ABNORMAL CARDIOVASCULAR STRESS TEST: Primary | ICD-10-CM

## 2022-11-29 RX ORDER — SODIUM CHLORIDE 9 MG/ML
INJECTION, SOLUTION INTRAVENOUS CONTINUOUS
Status: CANCELLED | OUTPATIENT
Start: 2022-11-29 | End: 2022-11-29

## 2022-11-29 RX ORDER — DIPHENHYDRAMINE HCL 50 MG
50 CAPSULE ORAL ONCE
Status: CANCELLED | OUTPATIENT
Start: 2022-11-29 | End: 2022-11-29

## 2023-01-11 NOTE — PROGRESS NOTES
Mr. Song is a patient of Dr. Raza and was last seen in clinic 10/12/2022.      Subjective:   Patient ID:  Rex Song is a 77 y.o. male who presents for follow-up of Atrial Fibrillation  .     HPI:    Mr. Song is a 77 y.o. male with CAD, CM, HTN, AF, CKD4 here for follow up.     Background:    Mr. Song is a 77 year-old man with coronary artery disease (RCA , PCI to LCX/PDA) with a LVEF of 45%, hypertension, multiple sclerosis, stage 4 chronic kidney disease, blood loss anemia, and atrial fibrillation/flutter despite amiodarone therapy. His AF was originally observed in July of 2021 which persisted. A holter monitor from August 2021 noted rate controlled AF. His EF was observed to decline to 30%. He underwent DCCV in 9/2021 with early recurrence of AF (EF noted to be 25% on KERRY). He was initiated on amiodarone at a follow-up visit with Dr. Jones in November of 2021. He was then observed to be in sinus rhythm in February of 2022. An echocardiogram in March of 2022 noted improvement in his LVEF to 45%. He was admitted to the hospital in April of 2022 with fatigue, anemia, and melena requiring transfusions. EGD/colonoscopy were unrevealing. Video capsule was also unrevealing. His AF recurred at this time for which he presents. He currently has no complaints.     ECGs which show sinus rhythm or AF.  In clinic ECG is AF with an average ventricular rate of 91 bpm.    In summary, Mr. Song  is a pleasant 77 year-old man with coronary artery disease (RCA , PCI to LCX/PDA) with a LVEF of 45%, hypertension, multiple sclerosis, stage 4 chronic kidney disease, blood loss anemia, and atrial fibrillation/flutter despite amiodarone therapy. Discussed concern that his decline in LVEF may have been related to his AF persistence. Discussed trial of DCCV and increasing amiodarone with seeing if he could tolerate sinus bradycardia in the 50s. Discussed PVI as an option. He was not interested in either of those  and reports he would rather remain in AF. Will stop amiodarone, increase metoprolol and get a 24hr holter in 3 months prior to return visit.  Discussed my concern for his long-term tolerability of anticoagulation. Discussed Watchman implantation. We had discussed the process of the procedure and he had no interest in it.     Holter: Baseline rhythm was sinus bradycardia with normal intervals and an average heart rate of 57 bpm. There were no concerning atrial or ventricular arrhythmias.    10/12/2022: Stable from a rhythm standpoint. In SR on Holter monitor last month and in SR today. No longer on amiodarone. Last echo showed decrease in EF from 45% to 35% despite lack of AF seen. PET stress to check for new ischemia given CAD hx. (Last ischemic eval 2015) Echo already scheduled for end of next month - if EF <35%, obtain longer monitor and discuss ICD if monitor continues to show SR.  He is tolerating OAC at this time. He feels well with no cardiac complaints.     Update (01/18/2023):    11/2/2022: Called and spoke with patient's wife about PET stress results. Area of ischemia with underlying infarct - would prefer he see interventional cardiology for further evaluation.     Today no new cardiac complaints. Mr. Song reports no chest pain with exertion or at rest, palpitations, SOB, REBOLLEDO, dizziness, or syncope.    He is currently taking eliquis 5mg BID for stroke prophylaxis and denies significant bleeding episodes. On toprol 50mg daily. Kidney function is stable, with a creatinine of 2.12 on 1/4/2023.    I have personally reviewed the patient's EKG today, which shows sinus rhythm at 59bpm. SC interval is 170. QRS is 98. QT is 430.    Relevant Cardiac Test Results:    2D Echo (7/20/2022):  The left ventricle is normal in size with moderately decreased systolic function.  The estimated ejection fraction is 35%.  There is moderate left ventricular global hypokinesis.  Grade I left ventricular diastolic  dysfunction.  Mild mitral regurgitation.  Mild tricuspid regurgitation.  Normal right ventricular size with normal right ventricular systolic function.  There is no pulmonary hypertension.    Current Outpatient Medications   Medication Sig    amLODIPine (NORVASC) 5 MG tablet TAKE 1 TABLET EVERY DAY (Patient taking differently: Take 5 mg by mouth once daily.)    cholecalciferol, vitamin D3, (VITAMIN D3) 25 mcg (1,000 unit) capsule Take 1 capsule (1,000 Units total) by mouth once daily.    clopidogreL (PLAVIX) 75 mg tablet TAKE 1 TABLET EVERY DAY    ELIQUIS 5 mg Tab TAKE 1 TABLET BY MOUTH TWICE A DAY    ferrous sulfate 325 (65 FE) MG EC tablet Take 1 tablet (325 mg total) by mouth once daily.    irbesartan (AVAPRO) 150 MG tablet Take 1 tablet (150 mg total) by mouth every evening.    levothyroxine (SYNTHROID) 25 MCG tablet Take 1 tablet (25 mcg total) by mouth before breakfast.    metoprolol succinate (TOPROL-XL) 50 MG 24 hr tablet Take 1 tablet (50 mg total) by mouth once daily.    nitroGLYCERIN (NITROSTAT) 0.4 MG SL tablet Place 1 tablet (0.4 mg total) under the tongue every 5 (five) minutes as needed.    omeprazole (PRILOSEC) 40 MG capsule TAKE 1 CAPSULE EVERY MORNING    rosuvastatin (CRESTOR) 40 MG Tab Take 1 tablet (40 mg total) by mouth once daily.     No current facility-administered medications for this visit.       Review of Systems   Constitutional: Negative for malaise/fatigue.   Cardiovascular:  Negative for chest pain, dyspnea on exertion, irregular heartbeat, leg swelling and palpitations.   Respiratory:  Negative for shortness of breath.    Hematologic/Lymphatic: Negative for bleeding problem.   Skin:  Negative for rash.   Musculoskeletal:  Negative for myalgias.   Gastrointestinal:  Negative for hematemesis, hematochezia and nausea.   Genitourinary:  Negative for hematuria.   Neurological:  Negative for light-headedness.   Psychiatric/Behavioral:  Negative for altered mental status.   "  Allergic/Immunologic: Negative for persistent infections.     Objective:          /66   Pulse (!) 59   Ht 5' 6" (1.676 m)   Wt 57.4 kg (126 lb 8.7 oz)   BMI 20.42 kg/m²     Physical Exam  Vitals and nursing note reviewed.   Constitutional:       Appearance: Normal appearance. He is well-developed.   HENT:      Head: Normocephalic.      Nose: Nose normal.   Eyes:      Pupils: Pupils are equal, round, and reactive to light.   Cardiovascular:      Rate and Rhythm: Normal rate and regular rhythm.   Pulmonary:      Effort: No respiratory distress.      Breath sounds: Normal breath sounds.   Musculoskeletal:         General: Normal range of motion.   Skin:     General: Skin is warm and dry.      Findings: No erythema.   Neurological:      Mental Status: He is alert and oriented to person, place, and time.   Psychiatric:         Speech: Speech normal.         Behavior: Behavior normal.         Lab Results   Component Value Date     01/04/2023    K 4.1 01/04/2023    MG 2.0 01/04/2023    BUN 19 01/04/2023    CREATININE 2.12 (H) 01/04/2023    ALT 12 10/24/2022    AST 21 10/24/2022    HGB 12.2 (L) 10/24/2022    HCT 39.3 (L) 10/24/2022    TSH 23.839 (H) 04/11/2022    LDLCALC 43.6 (L) 04/11/2022           Assessment:     1. Persistent atrial fibrillation    2. Essential hypertension    3. HFrEF (heart failure with reduced ejection fraction)    4. CKD (chronic kidney disease) stage 4, GFR 15-29 ml/min      Plan:     In summary, Mr. Song is a 77 y.o. male with CAD, CM, HTN, AF, CKD4 here for follow up.   He remains in sinus rhythm off amiodarone. No cardiac symptoms. CHADSVASc 5 on eliquis. No significant bleeding at this time and pt has declined Watchman.  Echo showed EF reduced from 45% to 35%. PET stress showed scar with some golden-infarct ischemia. Pt will be evaluated by interventional cardiology later today. Pt is on metoprolol and irbesartan. If no intervention planned, will repeat echo and discuss " possible ICD if EF 35% or less. Otherwise RTC 6 mo.     Interventional cardiology appt today  Echo if no intervention planned (UPDATE: No intervention planned given lack of symptoms and CKD - will update echo)  Continue current medications  Follow up depending on results. If EF above 35%, RTC 6 mo.     *A copy of this note has been sent to Dr. Raza*    Follow up as scheduled.    ------------------------------------------------------------------    LOUANN Evans, NP-C  Cardiac Electrophysiology

## 2023-01-17 ENCOUNTER — TELEPHONE (OUTPATIENT)
Dept: ELECTROPHYSIOLOGY | Facility: CLINIC | Age: 78
End: 2023-01-17
Payer: MEDICARE

## 2023-01-18 ENCOUNTER — OFFICE VISIT (OUTPATIENT)
Dept: CARDIOLOGY | Facility: CLINIC | Age: 78
End: 2023-01-18
Payer: MEDICARE

## 2023-01-18 ENCOUNTER — OFFICE VISIT (OUTPATIENT)
Dept: ELECTROPHYSIOLOGY | Facility: CLINIC | Age: 78
End: 2023-01-18
Payer: MEDICARE

## 2023-01-18 ENCOUNTER — HOSPITAL ENCOUNTER (OUTPATIENT)
Dept: CARDIOLOGY | Facility: CLINIC | Age: 78
Discharge: HOME | End: 2023-01-18
Payer: MEDICARE

## 2023-01-18 VITALS
DIASTOLIC BLOOD PRESSURE: 66 MMHG | HEIGHT: 66 IN | SYSTOLIC BLOOD PRESSURE: 130 MMHG | WEIGHT: 126.56 LBS | BODY MASS INDEX: 20.34 KG/M2 | HEART RATE: 59 BPM

## 2023-01-18 VITALS
DIASTOLIC BLOOD PRESSURE: 68 MMHG | BODY MASS INDEX: 20.3 KG/M2 | OXYGEN SATURATION: 95 % | SYSTOLIC BLOOD PRESSURE: 137 MMHG | HEART RATE: 60 BPM | HEIGHT: 66 IN | WEIGHT: 126.31 LBS

## 2023-01-18 DIAGNOSIS — I25.110 ATHEROSCLEROSIS OF NATIVE CORONARY ARTERY OF NATIVE HEART WITH UNSTABLE ANGINA PECTORIS: Primary | ICD-10-CM

## 2023-01-18 DIAGNOSIS — I73.9 PAD (PERIPHERAL ARTERY DISEASE): ICD-10-CM

## 2023-01-18 DIAGNOSIS — I50.20 HFREF (HEART FAILURE WITH REDUCED EJECTION FRACTION): Chronic | ICD-10-CM

## 2023-01-18 DIAGNOSIS — R94.39 ABNORMAL STRESS TEST: ICD-10-CM

## 2023-01-18 DIAGNOSIS — I48.19 PERSISTENT ATRIAL FIBRILLATION: Primary | Chronic | ICD-10-CM

## 2023-01-18 DIAGNOSIS — I10 ESSENTIAL HYPERTENSION: Chronic | ICD-10-CM

## 2023-01-18 DIAGNOSIS — I48.91 ATRIAL FIBRILLATION, UNSPECIFIED TYPE: ICD-10-CM

## 2023-01-18 DIAGNOSIS — I48.19 PERSISTENT ATRIAL FIBRILLATION: Chronic | ICD-10-CM

## 2023-01-18 DIAGNOSIS — E78.2 MIXED HYPERLIPIDEMIA: Chronic | ICD-10-CM

## 2023-01-18 DIAGNOSIS — N18.4 CKD (CHRONIC KIDNEY DISEASE) STAGE 4, GFR 15-29 ML/MIN: Chronic | ICD-10-CM

## 2023-01-18 PROCEDURE — 1157F ADVNC CARE PLAN IN RCRD: CPT | Mod: HCNC,CPTII,S$GLB, | Performed by: NURSE PRACTITIONER

## 2023-01-18 PROCEDURE — 99499 UNLISTED E&M SERVICE: CPT | Mod: S$GLB,,, | Performed by: NURSE PRACTITIONER

## 2023-01-18 PROCEDURE — 3288F PR FALLS RISK ASSESSMENT DOCUMENTED: ICD-10-PCS | Mod: HCNC,CPTII,S$GLB, | Performed by: NURSE PRACTITIONER

## 2023-01-18 PROCEDURE — 99204 PR OFFICE/OUTPT VISIT, NEW, LEVL IV, 45-59 MIN: ICD-10-PCS | Mod: HCNC,S$GLB,, | Performed by: INTERNAL MEDICINE

## 2023-01-18 PROCEDURE — 1157F PR ADVANCE CARE PLAN OR EQUIV PRESENT IN MEDICAL RECORD: ICD-10-PCS | Mod: HCNC,CPTII,S$GLB, | Performed by: NURSE PRACTITIONER

## 2023-01-18 PROCEDURE — 1126F PR PAIN SEVERITY QUANTIFIED, NO PAIN PRESENT: ICD-10-PCS | Mod: HCNC,CPTII,S$GLB, | Performed by: NURSE PRACTITIONER

## 2023-01-18 PROCEDURE — 1159F MED LIST DOCD IN RCRD: CPT | Mod: HCNC,CPTII,S$GLB, | Performed by: NURSE PRACTITIONER

## 2023-01-18 PROCEDURE — 3075F PR MOST RECENT SYSTOLIC BLOOD PRESS GE 130-139MM HG: ICD-10-PCS | Mod: HCNC,CPTII,S$GLB, | Performed by: NURSE PRACTITIONER

## 2023-01-18 PROCEDURE — 3075F PR MOST RECENT SYSTOLIC BLOOD PRESS GE 130-139MM HG: ICD-10-PCS | Mod: HCNC,CPTII,S$GLB, | Performed by: INTERNAL MEDICINE

## 2023-01-18 PROCEDURE — 93010 ELECTROCARDIOGRAM REPORT: CPT | Mod: HCNC,S$GLB,, | Performed by: INTERNAL MEDICINE

## 2023-01-18 PROCEDURE — 99499 RISK ADDL DX/OHS AUDIT: ICD-10-PCS | Mod: S$GLB,,, | Performed by: NURSE PRACTITIONER

## 2023-01-18 PROCEDURE — 1101F PT FALLS ASSESS-DOCD LE1/YR: CPT | Mod: HCNC,CPTII,S$GLB, | Performed by: NURSE PRACTITIONER

## 2023-01-18 PROCEDURE — 1157F PR ADVANCE CARE PLAN OR EQUIV PRESENT IN MEDICAL RECORD: ICD-10-PCS | Mod: HCNC,CPTII,S$GLB, | Performed by: INTERNAL MEDICINE

## 2023-01-18 PROCEDURE — 93010 RHYTHM STRIP: ICD-10-PCS | Mod: HCNC,S$GLB,, | Performed by: INTERNAL MEDICINE

## 2023-01-18 PROCEDURE — 1126F AMNT PAIN NOTED NONE PRSNT: CPT | Mod: HCNC,CPTII,S$GLB, | Performed by: INTERNAL MEDICINE

## 2023-01-18 PROCEDURE — 99204 OFFICE O/P NEW MOD 45 MIN: CPT | Mod: HCNC,S$GLB,, | Performed by: INTERNAL MEDICINE

## 2023-01-18 PROCEDURE — 1159F PR MEDICATION LIST DOCUMENTED IN MEDICAL RECORD: ICD-10-PCS | Mod: HCNC,CPTII,S$GLB, | Performed by: NURSE PRACTITIONER

## 2023-01-18 PROCEDURE — 1160F RVW MEDS BY RX/DR IN RCRD: CPT | Mod: HCNC,CPTII,S$GLB, | Performed by: NURSE PRACTITIONER

## 2023-01-18 PROCEDURE — 99999 PR PBB SHADOW E&M-EST. PATIENT-LVL III: ICD-10-PCS | Mod: PBBFAC,HCNC,, | Performed by: NURSE PRACTITIONER

## 2023-01-18 PROCEDURE — 3075F SYST BP GE 130 - 139MM HG: CPT | Mod: HCNC,CPTII,S$GLB, | Performed by: INTERNAL MEDICINE

## 2023-01-18 PROCEDURE — 99499 RISK ADDL DX/OHS AUDIT: ICD-10-PCS | Mod: HCNC,S$GLB,, | Performed by: STUDENT IN AN ORGANIZED HEALTH CARE EDUCATION/TRAINING PROGRAM

## 2023-01-18 PROCEDURE — 1157F ADVNC CARE PLAN IN RCRD: CPT | Mod: HCNC,CPTII,S$GLB, | Performed by: INTERNAL MEDICINE

## 2023-01-18 PROCEDURE — 1101F PR PT FALLS ASSESS DOC 0-1 FALLS W/OUT INJ PAST YR: ICD-10-PCS | Mod: HCNC,CPTII,S$GLB, | Performed by: NURSE PRACTITIONER

## 2023-01-18 PROCEDURE — 99999 PR PBB SHADOW E&M-EST. PATIENT-LVL IV: ICD-10-PCS | Mod: PBBFAC,HCNC,, | Performed by: INTERNAL MEDICINE

## 2023-01-18 PROCEDURE — 99214 OFFICE O/P EST MOD 30 MIN: CPT | Mod: HCNC,S$GLB,, | Performed by: NURSE PRACTITIONER

## 2023-01-18 PROCEDURE — 3078F DIAST BP <80 MM HG: CPT | Mod: HCNC,CPTII,S$GLB, | Performed by: NURSE PRACTITIONER

## 2023-01-18 PROCEDURE — 3078F PR MOST RECENT DIASTOLIC BLOOD PRESSURE < 80 MM HG: ICD-10-PCS | Mod: HCNC,CPTII,S$GLB, | Performed by: NURSE PRACTITIONER

## 2023-01-18 PROCEDURE — 93005 RHYTHM STRIP: ICD-10-PCS | Mod: HCNC,S$GLB,, | Performed by: INTERNAL MEDICINE

## 2023-01-18 PROCEDURE — 99999 PR PBB SHADOW E&M-EST. PATIENT-LVL III: CPT | Mod: PBBFAC,HCNC,, | Performed by: NURSE PRACTITIONER

## 2023-01-18 PROCEDURE — 1126F PR PAIN SEVERITY QUANTIFIED, NO PAIN PRESENT: ICD-10-PCS | Mod: HCNC,CPTII,S$GLB, | Performed by: INTERNAL MEDICINE

## 2023-01-18 PROCEDURE — 3078F DIAST BP <80 MM HG: CPT | Mod: HCNC,CPTII,S$GLB, | Performed by: INTERNAL MEDICINE

## 2023-01-18 PROCEDURE — 99999 PR PBB SHADOW E&M-EST. PATIENT-LVL IV: CPT | Mod: PBBFAC,HCNC,, | Performed by: INTERNAL MEDICINE

## 2023-01-18 PROCEDURE — 3078F PR MOST RECENT DIASTOLIC BLOOD PRESSURE < 80 MM HG: ICD-10-PCS | Mod: HCNC,CPTII,S$GLB, | Performed by: INTERNAL MEDICINE

## 2023-01-18 PROCEDURE — 93005 ELECTROCARDIOGRAM TRACING: CPT | Mod: HCNC,S$GLB,, | Performed by: INTERNAL MEDICINE

## 2023-01-18 PROCEDURE — 99499 UNLISTED E&M SERVICE: CPT | Mod: HCNC,S$GLB,, | Performed by: STUDENT IN AN ORGANIZED HEALTH CARE EDUCATION/TRAINING PROGRAM

## 2023-01-18 PROCEDURE — 1160F PR REVIEW ALL MEDS BY PRESCRIBER/CLIN PHARMACIST DOCUMENTED: ICD-10-PCS | Mod: HCNC,CPTII,S$GLB, | Performed by: NURSE PRACTITIONER

## 2023-01-18 PROCEDURE — 1126F AMNT PAIN NOTED NONE PRSNT: CPT | Mod: HCNC,CPTII,S$GLB, | Performed by: NURSE PRACTITIONER

## 2023-01-18 PROCEDURE — 3288F FALL RISK ASSESSMENT DOCD: CPT | Mod: HCNC,CPTII,S$GLB, | Performed by: NURSE PRACTITIONER

## 2023-01-18 PROCEDURE — 99214 PR OFFICE/OUTPT VISIT, EST, LEVL IV, 30-39 MIN: ICD-10-PCS | Mod: HCNC,S$GLB,, | Performed by: NURSE PRACTITIONER

## 2023-01-18 PROCEDURE — 3075F SYST BP GE 130 - 139MM HG: CPT | Mod: HCNC,CPTII,S$GLB, | Performed by: NURSE PRACTITIONER

## 2023-01-18 NOTE — PROGRESS NOTES
"    Interventional Cardiology Clinic Note  Reason for Visit: abnormal stress  Referring Provider: Farhana Gonzales NP    HPI:   year-old man with coronary artery disease (RCA , PCI to LCX/PDA) with a LVEF of 45%, hypertension, multiple sclerosis, stage 4 chronic kidney disease, blood loss anemia, and atrial fibrillation/flutter who presents to clinic for abnormal stress test    Is followed in EP and had a PET stress ordered which showed a small reversible defect in the LAD territory.  Was referred to IC for evaluation.  Pt denies any SOB or chest pain.  He is able to walk about 3 blocks before he gets "winded" and has to take a break.  He denies any chest pain or claudication.  After a few seconds he continues on.  Currently smoking tobacco.  Denies any alcohol or illicits.      ROS:    Constitution: Negative for fever, chills, weight loss or gain.   HENT: Negative for sore throat, rhinorrhea, or headache.  Eyes: Negative for blurred or double vision.   Cardiovascular: See above  Pulmonary: Negative for SOB   Gastrointestinal: Negative for abdominal pain, nausea, vomiting, or diarrhea.   : Negative for dysuria.   Neurological: Negative for focal weakness or sensory changes.  PMH:     Past Medical History:   Diagnosis Date    Bilateral renal cysts     CHF (congestive heart failure)     CKD (chronic kidney disease) stage 4, GFR 15-29 ml/min     Hematuria, unspecified     Hypertension     Iron deficiency anemia     Personal history of colonic polyps 01/01/2005    Proteinuria     PVD (peripheral vascular disease)      Past Surgical History:   Procedure Laterality Date    COLONOSCOPY N/A 4/12/2022    Procedure: COLONOSCOPY;  Surgeon: Earl Hall MD;  Location: [facility];  Service: Endoscopy;  Laterality: N/A;    CORONARY ANGIOPLASTY WITH STENT PLACEMENT  08/18/2000    LCX stent/ PDA stent    ESOPHAGOGASTRODUODENOSCOPY N/A 4/11/2022    Procedure: EGD (ESOPHAGOGASTRODUODENOSCOPY);  Surgeon: Yosvany Dominique MD; "  Location: [facility] ENDO;  Service: Endoscopy;  Laterality: N/A;    INTRALUMINAL GASTROINTESTINAL TRACT IMAGING VIA CAPSULE N/A 5/4/2022    Procedure: IMAGING PROCEDURE, GI TRACT, INTRALUMINAL, VIA CAPSULE;  Surgeon: Yosvany Dominique MD;  Location: [facility];  Service: Endoscopy;  Laterality: N/A;     Allergies:     Review of patient's allergies indicates:   Allergen Reactions    Cortisone      Other reaction(s): Itching     Medications:     Current Outpatient Medications on File Prior to Visit   Medication Sig Dispense Refill    amLODIPine (NORVASC) 5 MG tablet TAKE 1 TABLET EVERY DAY (Patient taking differently: Take 5 mg by mouth once daily.) 90 tablet 3    cholecalciferol, vitamin D3, (VITAMIN D3) 25 mcg (1,000 unit) capsule Take 1 capsule (1,000 Units total) by mouth once daily. 30 capsule 1    clopidogreL (PLAVIX) 75 mg tablet TAKE 1 TABLET EVERY DAY 90 tablet 3    ELIQUIS 5 mg Tab TAKE 1 TABLET BY MOUTH TWICE A DAY 60 tablet 3    ferrous sulfate 325 (65 FE) MG EC tablet Take 1 tablet (325 mg total) by mouth once daily. 30 tablet 3    irbesartan (AVAPRO) 150 MG tablet Take 1 tablet (150 mg total) by mouth every evening. 90 tablet 3    levothyroxine (SYNTHROID) 25 MCG tablet Take 1 tablet (25 mcg total) by mouth before breakfast. 30 tablet 11    metoprolol succinate (TOPROL-XL) 50 MG 24 hr tablet Take 1 tablet (50 mg total) by mouth once daily. 90 tablet 3    nitroGLYCERIN (NITROSTAT) 0.4 MG SL tablet Place 1 tablet (0.4 mg total) under the tongue every 5 (five) minutes as needed. 25 tablet 5    omeprazole (PRILOSEC) 40 MG capsule TAKE 1 CAPSULE EVERY MORNING 90 capsule 0    rosuvastatin (CRESTOR) 40 MG Tab Take 1 tablet (40 mg total) by mouth once daily. 90 tablet 3     No current facility-administered medications on file prior to visit.     Social History:     Social History     Tobacco Use    Smoking status: Former     Packs/day: 0.25     Types: Cigarettes    Smokeless tobacco: Never   Substance Use Topics     "Alcohol use: No     Alcohol/week: 0.0 standard drinks     Family History:     Family History   Problem Relation Age of Onset    Prostate cancer Brother 59     Physical Exam:   /68 (BP Location: Left arm, Patient Position: Sitting, BP Method: Large (Automatic))   Pulse 60   Ht 5' 6" (1.676 m)   Wt 57.3 kg (126 lb 5.2 oz)   SpO2 95%   BMI 20.39 kg/m²    Wt Readings from Last 4 Encounters:   01/18/23 57.3 kg (126 lb 5.2 oz)   01/18/23 57.4 kg (126 lb 8.7 oz)   01/05/23 58.1 kg (128 lb)   11/16/22 61.7 kg (136 lb)         Constitutional: No distress, obese, conversant  HEENT: Sclera anicteric, PERRLA, EOMI  Neck: No JVD, no masses, good movement  CV: RRR, S1 and S2 normal, no additional heart sounds or murmurs. Pulses 2+ and equal bilaterally in radial arteries, Pete's normal on right. Distal pulses are 2+ and equal in the femoral, DP and PT areas bilaterally  Pulm: Clear to auscultation bilaterally with symmetrical expansion. Chest wall palpated for reproduction of pain symptoms, and no pain was able to be produced on palpation or resistance exercises  GI: Abdomen soft, non-tender, good bowel sounds  Extremities: Both extremities intact and grossly normal, skin is warm, no edema noted  Skin: No ecchymosis, erythema, or ulcers  Psych: AOx3, appropriate affect  Neuro: CNII-XII intact, no focal deficits      Labs:     Lab Results   Component Value Date     01/04/2023    K 4.1 01/04/2023     01/04/2023    CO2 32 (H) 01/04/2023    BUN 19 01/04/2023    CREATININE 2.12 (H) 01/04/2023    ANIONGAP 7 (L) 01/04/2023     Lab Results   Component Value Date    HGBA1C 5.2 04/11/2022     No results found for: BNP, BNPTRIAGEBLO Lab Results   Component Value Date    WBC 5.73 10/24/2022    HGB 12.2 (L) 10/24/2022    HCT 39.3 (L) 10/24/2022     (L) 10/24/2022    GRAN 3.8 10/24/2022    GRAN 65.9 10/24/2022     Lab Results   Component Value Date    CHOL 98 (L) 04/11/2022    HDL 29 (L) 04/11/2022    LDLCALC " 43.6 (L) 04/11/2022    TRIG 127 04/11/2022          Imaging:       EF   Date Value Ref Range Status   07/20/2022 35 % Final   04/05/2022 45 % Final   09/22/2021 25 % Final     Nuc Stress EF   Date Value Ref Range Status   11/02/2022 37 % Final     Nuc Rest EF   Date Value Ref Range Status   11/02/2022 35  Final       TTE:   The left ventricle is normal in size with moderately decreased systolic function.  The estimated ejection fraction is 35%.  There is moderate left ventricular global hypokinesis.  Grade I left ventricular diastolic dysfunction.  Mild mitral regurgitation.  Mild tricuspid regurgitation.  Normal right ventricular size with normal right ventricular systolic function.  There is no pulmonary hypertension.    Stress Test: PET    There are two significant perfusion abnormalities.    Perfusion Abnormality #1 - There is a small to moderate sized, moderate to severe intensity basal to mid inferior fixed perfusion abnormality involving 10% of LV myocardium in the distribution of the RCA.    Perfusion Abnormality #2 - There is a very small (<5%) sized, mild intensity apical and inferolateral resting perfusion abnormality involving 1% of LV myocardium in the distribution of the LAD territory. After pharmacologic stress, this perfusion abnormality is larger and more severe involving 5% of the LV myocardium indicative of ischemia with underlying infarct.    There are no other significant perfusion abnormalities.    The whole heart absolute myocardial perfusion values averaged 0.64 cc/min/g at rest, which is normal; 1.26 cc/min/g at stress, which is mildly reduced; and CFR is 1.99 , which is mildly reduced.    CT attenuation images demonstrate severe diffuse coronary calcifications in the LAD and LCX territory and moderate diffuse aortic calcifications in the ascending aorta and descending aorta.    The gated perfusion images showed an ejection fraction of 35% at rest and 37% during stress. A normal ejection  fraction is greater than 53%.    There is moderate global hypokinesis at rest and during stress.    There is basal to mid inferior wall akinesis at rest and during stress.    The LV cavity size is mildly enlarged at rest and stress.    The EKG portion of this study is negative for ischemia.    During stress, rare PVCs are noted.    The patient reported no chest pain during the stress test.    Coronary flow reserve within small apical defect is not severely reduced, which suggest that this is not contributing to any overall decline in cardiac function.  Assessment:    year-old man with coronary artery disease (RCA , PCI to LCX/PDA) with a LVEF of 45%, hypertension, multiple sclerosis, stage 4 chronic kidney disease, blood loss anemia, and atrial fibrillation/flutter who presents to clinic for abnormal stress test     Plan:     #Abnormal PET:  #CAD s/p PCI to LCx/PDA and RCA   #CKD 4  #HLD  - pt currently is having no symptoms and his LAD disease does not effect his quality of life.  Moreover he is CKD4  - recommend to medically manage given the risk of HD much higher than benefit  - can consider repeat TTE  - continue plavix and crestor 40mg qD (on eliquis)  - if pt has any chest pain or worsening symptoms or is initiated on HD, can re-consider ischemic evaluation    #AFlutter  #Afib:  - follows with EP  - AC with eliquis    #HFrEF: 35%  - consider repeat TTE  - continue GDMT with metoprolol succinate 50mg qD and ARB    #HTN:  BP in clinic 137/68  - continue home medications    Signed:  Carl Mariscal MD  Cardiology Fellow  Pager - [phone]  [facility]  1/18/2023 9:46 AM

## 2023-02-15 ENCOUNTER — TELEPHONE (OUTPATIENT)
Dept: FAMILY MEDICINE | Facility: CLINIC | Age: 78
End: 2023-02-15
Payer: MEDICARE

## 2023-02-16 DIAGNOSIS — I10 ESSENTIAL HYPERTENSION: ICD-10-CM

## 2023-02-16 RX ORDER — IRBESARTAN 150 MG/1
150 TABLET ORAL NIGHTLY
Qty: 90 TABLET | Refills: 3 | Status: SHIPPED | OUTPATIENT
Start: 2023-02-16

## 2023-03-22 ENCOUNTER — HOSPITAL ENCOUNTER (OUTPATIENT)
Dept: CARDIOLOGY | Facility: HOSPITAL | Age: 78
Discharge: HOME | End: 2023-03-22
Attending: NURSE PRACTITIONER
Payer: MEDICARE

## 2023-03-22 ENCOUNTER — TELEPHONE (OUTPATIENT)
Dept: ELECTROPHYSIOLOGY | Facility: CLINIC | Age: 78
End: 2023-03-22
Payer: MEDICARE

## 2023-03-22 VITALS
BODY MASS INDEX: 20.73 KG/M2 | SYSTOLIC BLOOD PRESSURE: 130 MMHG | WEIGHT: 129 LBS | DIASTOLIC BLOOD PRESSURE: 80 MMHG | HEIGHT: 66 IN | HEART RATE: 60 BPM

## 2023-03-22 DIAGNOSIS — I48.19 PERSISTENT ATRIAL FIBRILLATION: ICD-10-CM

## 2023-03-22 DIAGNOSIS — I50.20 HFREF (HEART FAILURE WITH REDUCED EJECTION FRACTION): ICD-10-CM

## 2023-03-22 LAB
ASCENDING AORTA: 2.66 CM
AV INDEX (PROSTH): 0.46
AV MEAN GRADIENT: 3 MMHG
AV PEAK GRADIENT: 5 MMHG
AV VALVE AREA: 1.54 CM2
AV VELOCITY RATIO: 0.46
BSA FOR ECHO PROCEDURE: 1.65 M2
CV ECHO LV RWT: 0.27 CM
DOP CALC AO PEAK VEL: 1.12 M/S
DOP CALC AO VTI: 23.8 CM
DOP CALC LVOT AREA: 3.3 CM2
DOP CALC LVOT DIAMETER: 2.06 CM
DOP CALC LVOT PEAK VEL: 0.51 M/S
DOP CALC LVOT STROKE VOLUME: 36.54 CM3
DOP CALCLVOT PEAK VEL VTI: 10.97 CM
E WAVE DECELERATION TIME: 218.22 MSEC
E/A RATIO: 0.91
E/E' RATIO: 11.33 M/S
ECHO LV POSTERIOR WALL: 0.72 CM (ref 0.6–1.1)
EJECTION FRACTION: 28 %
FRACTIONAL SHORTENING: 21 % (ref 28–44)
INTERVENTRICULAR SEPTUM: 0.72 CM (ref 0.6–1.1)
LA MAJOR: 4.18 CM
LA MINOR: 4.01 CM
LA WIDTH: 3.48 CM
LEFT ATRIUM SIZE: 3.16 CM
LEFT ATRIUM VOLUME INDEX MOD: 22.6 ML/M2
LEFT ATRIUM VOLUME INDEX: 23 ML/M2
LEFT ATRIUM VOLUME MOD: 37.58 CM3
LEFT ATRIUM VOLUME: 38.26 CM3
LEFT INTERNAL DIMENSION IN SYSTOLE: 4.17 CM (ref 2.1–4)
LEFT VENTRICLE DIASTOLIC VOLUME INDEX: 79.88 ML/M2
LEFT VENTRICLE DIASTOLIC VOLUME: 132.6 ML
LEFT VENTRICLE MASS INDEX: 78 G/M2
LEFT VENTRICLE SYSTOLIC VOLUME INDEX: 46.6 ML/M2
LEFT VENTRICLE SYSTOLIC VOLUME: 77.33 ML
LEFT VENTRICULAR INTERNAL DIMENSION IN DIASTOLE: 5.25 CM (ref 3.5–6)
LEFT VENTRICULAR MASS: 129.32 G
LV LATERAL E/E' RATIO: 11.33 M/S
LV SEPTAL E/E' RATIO: 11.33 M/S
MV A" WAVE DURATION": 15.13 MSEC
MV PEAK A VEL: 0.75 M/S
MV PEAK E VEL: 0.68 M/S
MV STENOSIS PRESSURE HALF TIME: 63.28 MS
MV VALVE AREA P 1/2 METHOD: 3.48 CM2
PULM VEIN S/D RATIO: 1.26
PV PEAK D VEL: 0.27 M/S
PV PEAK S VEL: 0.34 M/S
RA MAJOR: 3.99 CM
RA PRESSURE: 3 MMHG
RA WIDTH: 2.48 CM
RIGHT VENTRICULAR END-DIASTOLIC DIMENSION: 2.61 CM
RV TISSUE DOPPLER FREE WALL SYSTOLIC VELOCITY 1 (APICAL 4 CHAMBER VIEW): 7.03 CM/S
SINUS: 2.71 CM
STJ: 2.15 CM
TDI LATERAL: 0.06 M/S
TDI SEPTAL: 0.06 M/S
TDI: 0.06 M/S
TRICUSPID ANNULAR PLANE SYSTOLIC EXCURSION: 1.75 CM

## 2023-03-22 PROCEDURE — 93306 TTE W/DOPPLER COMPLETE: CPT | Mod: 26,HCNC,, | Performed by: INTERNAL MEDICINE

## 2023-03-22 PROCEDURE — 93306 TTE W/DOPPLER COMPLETE: CPT | Mod: HCNC

## 2023-03-22 PROCEDURE — 93306 ECHO (CUPID ONLY): ICD-10-PCS | Mod: 26,HCNC,, | Performed by: INTERNAL MEDICINE

## 2023-03-22 NOTE — TELEPHONE ENCOUNTER
Spoke with pt's wife to schedule appointment to discuss echo results, per Farhana's request. Pt's wife stated they needed an appointment on a Wednesday. Pt verbally confirmed appointment date, time, and location, and thanked me for calling.

## 2023-03-23 ENCOUNTER — TELEPHONE (OUTPATIENT)
Dept: ELECTROPHYSIOLOGY | Facility: CLINIC | Age: 78
End: 2023-03-23
Payer: MEDICARE

## 2023-03-23 NOTE — TELEPHONE ENCOUNTER
Spoke with patient's wife on the phone about depressed LV function despite GDMT and no plans for revascularization. Discussed SCD risk. Discussed ICD. She stated that she generally makes decisions for him as he doesn't understand. I told her it would be better then to have a face-to-face discussion as I want to make sure he understands what we are discussing. He has an appointment with Farhana Gonzales in May. Will request this gets moved up.

## 2023-04-18 ENCOUNTER — HOSPITAL ENCOUNTER (OUTPATIENT)
Dept: CARDIOLOGY | Facility: CLINIC | Age: 78
Discharge: HOME | End: 2023-04-18
Payer: MEDICARE

## 2023-04-18 ENCOUNTER — OFFICE VISIT (OUTPATIENT)
Dept: ELECTROPHYSIOLOGY | Facility: CLINIC | Age: 78
End: 2023-04-18
Payer: MEDICARE

## 2023-04-18 VITALS
BODY MASS INDEX: 21.08 KG/M2 | WEIGHT: 131.19 LBS | HEIGHT: 66 IN | HEART RATE: 57 BPM | SYSTOLIC BLOOD PRESSURE: 140 MMHG | DIASTOLIC BLOOD PRESSURE: 70 MMHG

## 2023-04-18 DIAGNOSIS — I10 ESSENTIAL HYPERTENSION: Chronic | ICD-10-CM

## 2023-04-18 DIAGNOSIS — I48.0 PAROXYSMAL ATRIAL FIBRILLATION: Primary | Chronic | ICD-10-CM

## 2023-04-18 DIAGNOSIS — I48.91 ATRIAL FIBRILLATION, UNSPECIFIED TYPE: ICD-10-CM

## 2023-04-18 DIAGNOSIS — I50.20 HFREF (HEART FAILURE WITH REDUCED EJECTION FRACTION): Chronic | ICD-10-CM

## 2023-04-18 DIAGNOSIS — I25.119 CORONARY ARTERY DISEASE INVOLVING NATIVE HEART WITH ANGINA PECTORIS, UNSPECIFIED VESSEL OR LESION TYPE: Chronic | ICD-10-CM

## 2023-04-18 DIAGNOSIS — I71.43 INFRARENAL ABDOMINAL AORTIC ANEURYSM (AAA) WITHOUT RUPTURE: Chronic | ICD-10-CM

## 2023-04-18 PROCEDURE — 3078F PR MOST RECENT DIASTOLIC BLOOD PRESSURE < 80 MM HG: ICD-10-PCS | Mod: HCNC,CPTII,S$GLB, | Performed by: INTERNAL MEDICINE

## 2023-04-18 PROCEDURE — 99499 RISK ADDL DX/OHS AUDIT: ICD-10-PCS | Mod: S$GLB,,, | Performed by: INTERNAL MEDICINE

## 2023-04-18 PROCEDURE — 1159F PR MEDICATION LIST DOCUMENTED IN MEDICAL RECORD: ICD-10-PCS | Mod: HCNC,CPTII,S$GLB, | Performed by: INTERNAL MEDICINE

## 2023-04-18 PROCEDURE — 3077F PR MOST RECENT SYSTOLIC BLOOD PRESSURE >= 140 MM HG: ICD-10-PCS | Mod: HCNC,CPTII,S$GLB, | Performed by: INTERNAL MEDICINE

## 2023-04-18 PROCEDURE — 93010 ELECTROCARDIOGRAM REPORT: CPT | Mod: HCNC,S$GLB,, | Performed by: INTERNAL MEDICINE

## 2023-04-18 PROCEDURE — 1159F MED LIST DOCD IN RCRD: CPT | Mod: HCNC,CPTII,S$GLB, | Performed by: INTERNAL MEDICINE

## 2023-04-18 PROCEDURE — 1126F AMNT PAIN NOTED NONE PRSNT: CPT | Mod: HCNC,CPTII,S$GLB, | Performed by: INTERNAL MEDICINE

## 2023-04-18 PROCEDURE — 1101F PR PT FALLS ASSESS DOC 0-1 FALLS W/OUT INJ PAST YR: ICD-10-PCS | Mod: HCNC,CPTII,S$GLB, | Performed by: INTERNAL MEDICINE

## 2023-04-18 PROCEDURE — 1101F PT FALLS ASSESS-DOCD LE1/YR: CPT | Mod: HCNC,CPTII,S$GLB, | Performed by: INTERNAL MEDICINE

## 2023-04-18 PROCEDURE — 3077F SYST BP >= 140 MM HG: CPT | Mod: HCNC,CPTII,S$GLB, | Performed by: INTERNAL MEDICINE

## 2023-04-18 PROCEDURE — 93005 ELECTROCARDIOGRAM TRACING: CPT | Mod: HCNC,S$GLB,, | Performed by: INTERNAL MEDICINE

## 2023-04-18 PROCEDURE — 1157F ADVNC CARE PLAN IN RCRD: CPT | Mod: HCNC,CPTII,S$GLB, | Performed by: INTERNAL MEDICINE

## 2023-04-18 PROCEDURE — 3078F DIAST BP <80 MM HG: CPT | Mod: HCNC,CPTII,S$GLB, | Performed by: INTERNAL MEDICINE

## 2023-04-18 PROCEDURE — 1126F PR PAIN SEVERITY QUANTIFIED, NO PAIN PRESENT: ICD-10-PCS | Mod: HCNC,CPTII,S$GLB, | Performed by: INTERNAL MEDICINE

## 2023-04-18 PROCEDURE — 99499 UNLISTED E&M SERVICE: CPT | Mod: S$GLB,,, | Performed by: INTERNAL MEDICINE

## 2023-04-18 PROCEDURE — 99999 PR PBB SHADOW E&M-EST. PATIENT-LVL III: CPT | Mod: PBBFAC,HCNC,, | Performed by: INTERNAL MEDICINE

## 2023-04-18 PROCEDURE — 3288F PR FALLS RISK ASSESSMENT DOCUMENTED: ICD-10-PCS | Mod: HCNC,CPTII,S$GLB, | Performed by: INTERNAL MEDICINE

## 2023-04-18 PROCEDURE — 99215 PR OFFICE/OUTPT VISIT, EST, LEVL V, 40-54 MIN: ICD-10-PCS | Mod: HCNC,S$GLB,, | Performed by: INTERNAL MEDICINE

## 2023-04-18 PROCEDURE — 3288F FALL RISK ASSESSMENT DOCD: CPT | Mod: HCNC,CPTII,S$GLB, | Performed by: INTERNAL MEDICINE

## 2023-04-18 PROCEDURE — 99999 PR PBB SHADOW E&M-EST. PATIENT-LVL III: ICD-10-PCS | Mod: PBBFAC,HCNC,, | Performed by: INTERNAL MEDICINE

## 2023-04-18 PROCEDURE — 93005 RHYTHM STRIP: ICD-10-PCS | Mod: HCNC,S$GLB,, | Performed by: INTERNAL MEDICINE

## 2023-04-18 PROCEDURE — 1157F PR ADVANCE CARE PLAN OR EQUIV PRESENT IN MEDICAL RECORD: ICD-10-PCS | Mod: HCNC,CPTII,S$GLB, | Performed by: INTERNAL MEDICINE

## 2023-04-18 PROCEDURE — 99215 OFFICE O/P EST HI 40 MIN: CPT | Mod: HCNC,S$GLB,, | Performed by: INTERNAL MEDICINE

## 2023-04-18 PROCEDURE — 93010 RHYTHM STRIP: ICD-10-PCS | Mod: HCNC,S$GLB,, | Performed by: INTERNAL MEDICINE

## 2023-04-18 NOTE — PROGRESS NOTES
Subjective:    Patient ID:  Rex Song is a 78 y.o. male who presents for evaluation of Atrial Fibrillation and Congestive Heart Failure      HPI  Prior Hx:  I had the pleasure of seeing Mr. Song today in our electrophysiology clinic in follow-up for his atrial arrhythmia and systolic heart failure. As you are aware he is a pleasant 78 year-old man with coronary artery disease (RCA , PCI to LCX/PDA) with a LVEF of 45%, hypertension, multiple sclerosis, stage 4 chronic kidney disease, blood loss anemia, and atrial fibrillation/flutter despite amiodarone therapy. His AF was originally observed in July of 2021 which persisted. A holter monitor from August 2021 noted rate controlled AF. His EF was observed to decline to 30%. He underwent DCCV in 9/2021 with early recurrence of AF (EF noted to be 25% on KERRY). He was initiated on amiodarone at a follow-up visit with Dr. Jones in November of 2021. He was then observed to be in sinus rhythm in February of 2022. An echocardiogram in March of 2022 noted improvement in his LVEF to 45%. He was admitted to the hospital in April of 2022 with fatigue, anemia, and melena requiring transfusions. EGD/colonoscopy were unrevealing. Video capsule was also unrevealing. His AF recurred at this time for which he presented in 6/2022. We discussed trial again of DCCV with increased amiodarone dosing versus PVI. They were not interested. We stopped amiodarone and increased metoprolol.    7/2022: ECHO noted LVEF of 35%    10/2022 holter: sinus rhythm with average rate of 57 bpm. Seen by Farhana Gonzales. PET stress ordered.    11/2022: PET stress test noted fixed perfusion abnormalities. Seen by Dr. Ly in interventional clinic. Plan for medical management.    3/2023 ECHO: LVEF 25-30%.    Interim Hx:  Mr. Song returns for follow-up. He has no current complaints.     I reviewed available ECGs which show sinus rhythm or AF.    My interpretation of today's in clinic ECG is  sinus rhythm with a rate of 57 bpm.    Review of Systems   Constitutional: Negative for fever and malaise/fatigue.   HENT:  Negative for congestion and sore throat.    Eyes:  Negative for blurred vision and visual disturbance.   Cardiovascular:  Negative for chest pain, dyspnea on exertion, irregular heartbeat, near-syncope, palpitations and syncope.   Respiratory:  Negative for cough and shortness of breath.    Hematologic/Lymphatic: Negative for bleeding problem. Does not bruise/bleed easily.   Skin: Negative.    Musculoskeletal: Negative.    Gastrointestinal:  Negative for bloating, abdominal pain, hematochezia and melena.   Neurological:  Negative for focal weakness and weakness.   Psychiatric/Behavioral: Negative.        Objective:    Physical Exam  Vitals reviewed.   Constitutional:       General: He is not in acute distress.     Appearance: He is well-developed. He is not diaphoretic.   HENT:      Head: Normocephalic and atraumatic.   Eyes:      General:         Right eye: No discharge.         Left eye: No discharge.      Conjunctiva/sclera: Conjunctivae normal.   Cardiovascular:      Rate and Rhythm: Normal rate and regular rhythm.      Heart sounds: No murmur heard.    No friction rub. No gallop.   Pulmonary:      Effort: Pulmonary effort is normal. No respiratory distress.      Breath sounds: Normal breath sounds. No wheezing or rales.   Abdominal:      General: Bowel sounds are normal. There is no distension.      Palpations: Abdomen is soft.      Tenderness: There is no abdominal tenderness.   Musculoskeletal:      Cervical back: Neck supple.   Skin:     General: Skin is warm and dry.   Neurological:      Mental Status: He is alert and oriented to person, place, and time.   Psychiatric:         Behavior: Behavior normal.         Thought Content: Thought content normal.         Judgment: Judgment normal.         Assessment:       1. Paroxysmal atrial fibrillation    2. HFrEF (heart failure with reduced  ejection fraction)    3. Coronary artery disease involving native heart with angina pectoris, unspecified vessel or lesion type    4. Essential hypertension    5. Infrarenal abdominal aortic aneurysm (AAA) without rupture         Plan:       In summary, Mr. Song  is a pleasant 78 year-old man with coronary artery disease (RCA , PCI to LCX/PDA) with a LVEF of 25-30%, hypertension, multiple sclerosis, stage 4 chronic kidney disease, blood loss anemia, and atrial fibrillation/flutter despite amiodarone therapy. Plan is to now do rate control. He is paroxysmal. His EF has declined despite sinus rhythm and despite being on GDMT. He is not a candidate for PCI. This patient has a chronic ischemic systolic cardiomyopathy with a prior myocardial infarction which occurred years ago and he is not a candidate for revascularization per interventional cardiology and an LVEF of 25-30% with NYHA class 2 heart failure symptoms that has persisted despite more than 3 months of maximally tolerated goal directed medical therapy consisting of metoprolol and irbesartan. We discussed the etiology and pathophysiology of sudden cardiac death in a patient population such as his and how an ICD may provide mortality benefit. We discussed the long-term implications of device implantation including infection risk, inappropriate shocks, and device/lead malfunction requiring further procedures. We discussed the alternatives, benefits and risks of the procedure including pain, infection, bleeding, injury to lung causing pneumothorax requiring tube placement, injury to heart valves, puncture of the heart leading to pericardial effusion or tamponade requiring tube drainage, heart attack, stroke and death.  After an extensive discussion regarding the etiology and pathophysiology of sudden cardiac death in this type of patient population and the potential benefit of an ICD in the prevention of sudden cardiac death while utilizing our formal shared  decision-making tool ([facility] ICD decision tool), the patient elected to proceed with ICD implantation.     Plan  dcICD  SJM  Anesthesia  Hold eliquis 48 hours prior  Hold irbesartan AM of procedure  Lower eliquis to 2.5mg bid (age/creatinine)      Thank you for allowing me to participate in the care of this patient. Please do not hesitate to call me with any questions or concerns.    Nemesio Raza MD, PhD  Cardiac Electrophysiology

## 2023-04-25 DIAGNOSIS — N18.4 CKD (CHRONIC KIDNEY DISEASE) STAGE 4, GFR 15-29 ML/MIN: Primary | ICD-10-CM

## 2023-05-03 DIAGNOSIS — Z01.818 PRE-OP TESTING: ICD-10-CM

## 2023-05-03 DIAGNOSIS — I25.2 HISTORY OF MI (MYOCARDIAL INFARCTION): ICD-10-CM

## 2023-05-03 DIAGNOSIS — I50.20 HFREF (HEART FAILURE WITH REDUCED EJECTION FRACTION): Primary | ICD-10-CM

## 2023-05-03 DIAGNOSIS — Z79.01 CHRONIC ANTICOAGULATION: ICD-10-CM

## 2023-05-03 DIAGNOSIS — I25.5 ISCHEMIC CARDIOMYOPATHY: ICD-10-CM

## 2023-05-26 RX ORDER — ROSUVASTATIN CALCIUM 40 MG/1
TABLET, COATED ORAL
Qty: 90 TABLET | Refills: 3 | Status: SHIPPED | OUTPATIENT
Start: 2023-05-26

## 2023-05-31 ENCOUNTER — TELEPHONE (OUTPATIENT)
Dept: ELECTROPHYSIOLOGY | Facility: CLINIC | Age: 78
End: 2023-05-31
Payer: MEDICARE

## 2023-05-31 NOTE — TELEPHONE ENCOUNTER
Spoke with patient's wife who states that the patient developed a cold, however symptoms seems to be improving and feels that he is at the end of the illness. Wife wants to know if this will effect his scheduled procedure for device implant on 6/7/23.  Wife advised that if patient's symptoms are improving, and shows no signs or symptoms of infection, such as a productive cough, fever or chills then he should be OK to proceed on 6/7/2023. Instructed to notify me if symptoms change or worsen, as this will require his procedure to be rescheduled. Understanding verbalized.

## 2023-05-31 NOTE — TELEPHONE ENCOUNTER
----- Message from Elvie Cleveland MA sent at 5/31/2023  9:17 AM CDT -----  Regarding: FW: cold    ----- Message -----  From: Kassi Tabares  Sent: 5/31/2023   8:16 AM CDT  To: Ry FREDERICK Staff  Subject: cold                                             The pt is calling regarding his cold he has. Please call him back @ [phone]. Thanks, Kassi

## 2023-06-03 RX ORDER — OMEPRAZOLE 40 MG/1
CAPSULE, DELAYED RELEASE ORAL
Qty: 90 CAPSULE | Refills: 0 | Status: SHIPPED | OUTPATIENT
Start: 2023-06-03 | End: 2023-11-29

## 2023-06-05 ENCOUNTER — TELEPHONE (OUTPATIENT)
Dept: ELECTROPHYSIOLOGY | Facility: CLINIC | Age: 78
End: 2023-06-05
Payer: MEDICARE

## 2023-06-05 NOTE — TELEPHONE ENCOUNTER
Spoke to Patient's wife.    CONFIRMED procedure arrival time of 10 am on 6/7/2023 for dcICD with Dr Raza.    Reiterated instructions including:  -Directions to check in desk.    -NPO after midnight night prior to procedure. Fasting upon arrival to the hospital.    -High importance of HOLDING Eliquis x 2 days prior to the procedure and Irbesartan the morning of the procedure. Wife confirms last dose of Eliquis was taken on 6/4/2023.    -Pre-procedure LABS reviewed . CR 2.1 at baseline.     -Confirmed absence or presence of implanted device/stimulator N/A.    -Confirmed no recent or current fever, cough, or shortness of breath .    -Confirmed no redness, rash, irritation, or yeast infection to chest area.     -Bathe night prior and morning prior to procedure with Hibiclens solution or an antibacterial soap  -Reviewed current visitor policy    Patient verbalized understanding of above, denied any further questions and appreciated the call.

## 2023-06-07 ENCOUNTER — ANESTHESIA EVENT (OUTPATIENT)
Dept: MEDSURG UNIT | Facility: HOSPITAL | Age: 78
End: 2023-06-07
Payer: MEDICARE

## 2023-06-07 ENCOUNTER — HOSPITAL ENCOUNTER (OUTPATIENT)
Facility: HOSPITAL | Age: 78
Discharge: HOME | End: 2023-06-07
Attending: INTERNAL MEDICINE | Admitting: INTERNAL MEDICINE
Payer: MEDICARE

## 2023-06-07 ENCOUNTER — ANESTHESIA (OUTPATIENT)
Dept: MEDSURG UNIT | Facility: HOSPITAL | Age: 78
End: 2023-06-07
Payer: MEDICARE

## 2023-06-07 VITALS
OXYGEN SATURATION: 94 % | RESPIRATION RATE: 16 BRPM | DIASTOLIC BLOOD PRESSURE: 63 MMHG | SYSTOLIC BLOOD PRESSURE: 136 MMHG | HEART RATE: 69 BPM | TEMPERATURE: 99 F

## 2023-06-07 DIAGNOSIS — I25.2 HISTORY OF MI (MYOCARDIAL INFARCTION): ICD-10-CM

## 2023-06-07 DIAGNOSIS — I25.5 ISCHEMIC CARDIOMYOPATHY: ICD-10-CM

## 2023-06-07 DIAGNOSIS — Z95.9 CARDIAC DEVICE IN SITU: ICD-10-CM

## 2023-06-07 DIAGNOSIS — I49.9 ARRHYTHMIA: ICD-10-CM

## 2023-06-07 DIAGNOSIS — I48.0 PAROXYSMAL ATRIAL FIBRILLATION: Chronic | ICD-10-CM

## 2023-06-07 DIAGNOSIS — I50.20 HFREF (HEART FAILURE WITH REDUCED EJECTION FRACTION): ICD-10-CM

## 2023-06-07 PROCEDURE — 33249 INSJ/RPLCMT DEFIB W/LEAD(S): CPT | Mod: ,,, | Performed by: INTERNAL MEDICINE

## 2023-06-07 PROCEDURE — 93010 ELECTROCARDIOGRAM REPORT: CPT | Mod: ,,, | Performed by: INTERNAL MEDICINE

## 2023-06-07 PROCEDURE — C1894 INTRO/SHEATH, NON-LASER: HCPCS | Performed by: INTERNAL MEDICINE

## 2023-06-07 PROCEDURE — 37000009 HC ANESTHESIA EA ADD 15 MINS: Performed by: INTERNAL MEDICINE

## 2023-06-07 PROCEDURE — 93010 EKG 12-LEAD: ICD-10-PCS | Mod: ,,, | Performed by: INTERNAL MEDICINE

## 2023-06-07 PROCEDURE — 93005 ELECTROCARDIOGRAM TRACING: CPT

## 2023-06-07 PROCEDURE — 33249 INSJ/RPLCMT DEFIB W/LEAD(S): CPT | Performed by: INTERNAL MEDICINE

## 2023-06-07 PROCEDURE — C1777 LEAD, AICD, ENDO SINGLE COIL: HCPCS | Performed by: INTERNAL MEDICINE

## 2023-06-07 PROCEDURE — 63600175 PHARM REV CODE 636 W HCPCS: Performed by: NURSE ANESTHETIST, CERTIFIED REGISTERED

## 2023-06-07 PROCEDURE — 25000003 PHARM REV CODE 250: Performed by: INTERNAL MEDICINE

## 2023-06-07 PROCEDURE — 63600175 PHARM REV CODE 636 W HCPCS: Performed by: INTERNAL MEDICINE

## 2023-06-07 PROCEDURE — C1898 LEAD, PMKR, OTHER THAN TRANS: HCPCS | Performed by: INTERNAL MEDICINE

## 2023-06-07 PROCEDURE — 25000003 PHARM REV CODE 250: Performed by: NURSE ANESTHETIST, CERTIFIED REGISTERED

## 2023-06-07 PROCEDURE — 37000008 HC ANESTHESIA 1ST 15 MINUTES: Performed by: INTERNAL MEDICINE

## 2023-06-07 PROCEDURE — 63600175 PHARM REV CODE 636 W HCPCS: Performed by: NURSE PRACTITIONER

## 2023-06-07 PROCEDURE — C1721 AICD, DUAL CHAMBER: HCPCS | Performed by: INTERNAL MEDICINE

## 2023-06-07 PROCEDURE — 25000003 PHARM REV CODE 250: Performed by: NURSE PRACTITIONER

## 2023-06-07 PROCEDURE — D9220A PRA ANESTHESIA: Mod: ANES,,, | Performed by: STUDENT IN AN ORGANIZED HEALTH CARE EDUCATION/TRAINING PROGRAM

## 2023-06-07 PROCEDURE — D9220A PRA ANESTHESIA: ICD-10-PCS | Mod: CRNA,,, | Performed by: NURSE ANESTHETIST, CERTIFIED REGISTERED

## 2023-06-07 PROCEDURE — D9220A PRA ANESTHESIA: Mod: CRNA,,, | Performed by: NURSE ANESTHETIST, CERTIFIED REGISTERED

## 2023-06-07 PROCEDURE — D9220A PRA ANESTHESIA: ICD-10-PCS | Mod: ANES,,, | Performed by: STUDENT IN AN ORGANIZED HEALTH CARE EDUCATION/TRAINING PROGRAM

## 2023-06-07 PROCEDURE — 33249 PR ICD INSERT SNGL/DUAL W/LEADS: ICD-10-PCS | Mod: ,,, | Performed by: INTERNAL MEDICINE

## 2023-06-07 DEVICE — PACING LEAD
Type: IMPLANTABLE DEVICE | Site: HEART | Status: FUNCTIONAL
Brand: TENDRIL™

## 2023-06-07 DEVICE — IMPLANTABLE CARDIOVERTER DEFIBRILLATOR
Type: IMPLANTABLE DEVICE | Site: HEART | Status: FUNCTIONAL
Brand: GALLANT™

## 2023-06-07 DEVICE — DEFIBRILLATION LEAD
Type: IMPLANTABLE DEVICE | Site: HEART | Status: FUNCTIONAL
Brand: DURATA™

## 2023-06-07 RX ORDER — LIDOCAINE HYDROCHLORIDE 20 MG/ML
INJECTION, SOLUTION INFILTRATION; PERINEURAL
Status: DISCONTINUED | OUTPATIENT
Start: 2023-06-07 | End: 2023-06-07 | Stop reason: HOSPADM

## 2023-06-07 RX ORDER — DOXYCYCLINE 100 MG/1
100 CAPSULE ORAL EVERY 12 HOURS
Qty: 10 CAPSULE | Refills: 0 | Status: SHIPPED | OUTPATIENT
Start: 2023-06-07 | End: 2023-06-12

## 2023-06-07 RX ORDER — LIDOCAINE HYDROCHLORIDE 20 MG/ML
INJECTION INTRAVENOUS
Status: DISCONTINUED | OUTPATIENT
Start: 2023-06-07 | End: 2023-06-07

## 2023-06-07 RX ORDER — PROPOFOL 10 MG/ML
VIAL (ML) INTRAVENOUS
Status: DISCONTINUED | OUTPATIENT
Start: 2023-06-07 | End: 2023-06-07

## 2023-06-07 RX ORDER — SODIUM CHLORIDE 0.9 G/100ML
IRRIGANT IRRIGATION
Status: DISCONTINUED | OUTPATIENT
Start: 2023-06-07 | End: 2023-06-07 | Stop reason: HOSPADM

## 2023-06-07 RX ORDER — PROPOFOL 10 MG/ML
VIAL (ML) INTRAVENOUS CONTINUOUS PRN
Status: DISCONTINUED | OUTPATIENT
Start: 2023-06-07 | End: 2023-06-07

## 2023-06-07 RX ORDER — KETAMINE HCL IN 0.9 % NACL 50 MG/5 ML
SYRINGE (ML) INTRAVENOUS
Status: DISCONTINUED | OUTPATIENT
Start: 2023-06-07 | End: 2023-06-07

## 2023-06-07 RX ORDER — HALOPERIDOL 5 MG/ML
0.5 INJECTION INTRAMUSCULAR EVERY 10 MIN PRN
Status: DISCONTINUED | OUTPATIENT
Start: 2023-06-07 | End: 2023-06-07 | Stop reason: HOSPADM

## 2023-06-07 RX ORDER — EPHEDRINE SULFATE 50 MG/ML
INJECTION, SOLUTION INTRAVENOUS
Status: DISCONTINUED | OUTPATIENT
Start: 2023-06-07 | End: 2023-06-07

## 2023-06-07 RX ORDER — PHENYLEPHRINE HYDROCHLORIDE 10 MG/ML
INJECTION INTRAVENOUS
Status: DISCONTINUED | OUTPATIENT
Start: 2023-06-07 | End: 2023-06-07

## 2023-06-07 RX ORDER — VANCOMYCIN HYDROCHLORIDE 1 G/20ML
INJECTION, POWDER, LYOPHILIZED, FOR SOLUTION INTRAVENOUS
Status: DISCONTINUED | OUTPATIENT
Start: 2023-06-07 | End: 2023-06-07 | Stop reason: HOSPADM

## 2023-06-07 RX ORDER — ONDANSETRON 2 MG/ML
INJECTION INTRAMUSCULAR; INTRAVENOUS
Status: DISCONTINUED | OUTPATIENT
Start: 2023-06-07 | End: 2023-06-07

## 2023-06-07 RX ORDER — BUPIVACAINE HYDROCHLORIDE 2.5 MG/ML
INJECTION, SOLUTION EPIDURAL; INFILTRATION; INTRACAUDAL
Status: DISCONTINUED | OUTPATIENT
Start: 2023-06-07 | End: 2023-06-07 | Stop reason: HOSPADM

## 2023-06-07 RX ORDER — FENTANYL CITRATE 50 UG/ML
INJECTION, SOLUTION INTRAMUSCULAR; INTRAVENOUS
Status: DISCONTINUED | OUTPATIENT
Start: 2023-06-07 | End: 2023-06-07

## 2023-06-07 RX ORDER — FENTANYL CITRATE 50 UG/ML
25 INJECTION, SOLUTION INTRAMUSCULAR; INTRAVENOUS EVERY 5 MIN PRN
Status: DISCONTINUED | OUTPATIENT
Start: 2023-06-07 | End: 2023-06-07 | Stop reason: HOSPADM

## 2023-06-07 RX ORDER — SODIUM CHLORIDE 0.9 % (FLUSH) 0.9 %
10 SYRINGE (ML) INJECTION
Status: DISCONTINUED | OUTPATIENT
Start: 2023-06-07 | End: 2023-06-07 | Stop reason: HOSPADM

## 2023-06-07 RX ADMIN — ONDANSETRON 4 MG: 2 INJECTION INTRAMUSCULAR; INTRAVENOUS at 11:06

## 2023-06-07 RX ADMIN — PHENYLEPHRINE HYDROCHLORIDE 100 MCG: 10 INJECTION INTRAVENOUS at 12:06

## 2023-06-07 RX ADMIN — EPHEDRINE SULFATE 10 MG: 50 INJECTION INTRAVENOUS at 01:06

## 2023-06-07 RX ADMIN — FENTANYL CITRATE 25 MCG: 0.05 INJECTION, SOLUTION INTRAMUSCULAR; INTRAVENOUS at 11:06

## 2023-06-07 RX ADMIN — LIDOCAINE HYDROCHLORIDE 60 MG: 20 INJECTION INTRAVENOUS at 11:06

## 2023-06-07 RX ADMIN — SODIUM CHLORIDE: 0.9 INJECTION, SOLUTION INTRAVENOUS at 11:06

## 2023-06-07 RX ADMIN — EPHEDRINE SULFATE 10 MG: 50 INJECTION INTRAVENOUS at 12:06

## 2023-06-07 RX ADMIN — Medication 20 MG: at 12:06

## 2023-06-07 RX ADMIN — GLYCOPYRROLATE 0.2 MG: 0.2 INJECTION, SOLUTION INTRAMUSCULAR; INTRAVENOUS at 12:06

## 2023-06-07 RX ADMIN — Medication 50 MCG/KG/MIN: at 11:06

## 2023-06-07 RX ADMIN — PROPOFOL 40 MG: 10 INJECTION, EMULSION INTRAVENOUS at 11:06

## 2023-06-07 RX ADMIN — CEFAZOLIN 2 G: 2 INJECTION, POWDER, FOR SOLUTION INTRAMUSCULAR; INTRAVENOUS at 12:06

## 2023-06-07 NOTE — NURSING
Patient discharged per MD orders. Instructions given on medications, wound care, activity, signs of infection, when to call MD, and follow up appointments. Pt verbalized understanding. PIVs removed. Patient and family used wc off unit to private vehicle. Pt sent home with ice pack. Pt left unit in good condition with transport and wife.

## 2023-06-07 NOTE — DISCHARGE INSTRUCTIONS
Patient Discharge Instructions   Devices (Pacemakers & Defibrillators)        Restrictions   No pushing, pulling, or lifting greater than 5 pounds with device-side arm for 6 weeks.  No lifting device-side arm higher than shoulder level for 6 weeks.  You will be discharged wearing a sling and swathe. You will wear this for 48 hours. Then, the sling/swath should be removed and will remain off during the day. Continue to wear the sling/swath at night for 6 weeks.  No driving until your clinic wound check appointment (this will be 7-10 days post-procedure).   No submerging device incision site in a tub, pool, or body of water for 6 weeks.    Activity   No heavy activity with device-side arm for 6 weeks (no tennis, golf, bowling, aerobics, mowing the lawn, etc.).   Avoid rough contact at the device site for 6 weeks.   You may participate in sexual activity unless otherwise instructed.   You may return to work within 3-5 days, unless told otherwise, provided you adhere to the above activity restrictions.   If you only had a battery/generator change performed, then there are no postoperative activity restrictions.     Wound Care  If you are discharged with a white tape pressure dressing, remove this dressing after 24 hours.   If you are discharged with a water resistant Aquacel dressing, keep this dressing on until your follow-up appointment in 1 week. We will remove it at that time. IF you find that this dressing is no longer occlusive or sticking to your skin, it is okay to remove it prior to your 1 week follow-up appointment. The incision may then remain open to air.   There is dermabond skin glue over your incision. DO NOT scrub it off. Dermabond acts as another protective barrier and will eventually dissolve/flake off.   You will be discharged with 5 days of oral antibiotics. Please take the full prescription until it is gone.   Do not get your incision wet for 48 hours following the procedure. You may sponge bath  during this period. After 48 hours, you may shower, but avoid direct water contact to the incision and try to keep this area dry as possible. Allow the shower water to hit the back, not directly on the chest. Gently pat the incision/dressing dry if it does get wet.   Avoid using deodorants, orta, creams, lotions on your incision for 6 weeks.   If you notice increased swelling, redness, drainage, device site pain, chest pain, shortness of breath, or have a fever greater than 100 degrees, call our device clinic immediately: [phone] or [phone] during daytime business hours, OR call [phone] during after-hours or on weekends and ask for the electrophysiology fellow on call.     Long-Term Instructions  Keep your pacemaker or defibrillator identification card with you at all times.   If you have a defibrillator and you get shocked by your device: If you receive 1 shock and you feel okay, call [phone] or [phone] during normal office hours. For after hours, call [phone] and ask to speak with the on-call electrophysiology fellow. If you receive 1 shock and do NOT feel well, call EMS or go to the ER.   If you have a defibrillator and you get more than 1 shock from the device, or multiple shocks in a short period of time: Call EMS and or go to the nearest ER.   Appliances: You may operate any electrical device in your home, including microwaves.   Security Systems: Electromagnetic security systems can be located in the workplace, courthouse, airports, or other high security areas. Exposure to this type of security system has been shown to cause interference in some cases. Interference may be related to the duration of exposure and or the distance between the device and the security device. You should be aware of the location of security systems, moving through them at a normal pace, and avoid leaning or standing too close.   Metal Detectors at Airports: Metal detectors at airports can  potentially interfere with pacemakers or defibrillators, although it is unlikely. Metal detectors will likely be triggered by your device and therefore at places such as airports/courthouses it will be important for you to carry your identification card for the device. Airport personnel will likely prefer to do a manual search.   Cellular Phones: It is unlikely that using a cell phone will interfere with your device. It should be used with the hand opposite to the side where your device was implanted. The phone should not be carried in the shirt pocket on the same side as the device.   Specific Work Conditions: If you work near high voltage lines, transmitting towers, large motors, welding equipment, or powerful magnets, you should discuss your specific work conditions with your physician. In general, remain at least 2 feet from external electrical equipment, verify that the equipment is properly grounded, and wear insulated gloves when using electrical devices. Leave the immediate location if lightheadedness or other symptoms develop.   MRI: Some pacemakers and defibrillators are safe in MRI scanners, while others are not. Please consult your physician to see if you have an MRI-compatible device.   Surgery: Should you require surgery, brenda electrosurgical devices can interfere with your device function. You should discuss this with your surgeon prior to any operation.   Radiation Therapy: If you require radiation therapy, care must be taken to avoid irradiating the device.     Long-Term Follow-Up  Your device has the ability to transmit device information from home to the doctors office using a home monitoring system. This remote system takes the place of a doctors visit. Your device will be checked from home every 3-6 months. Every 6-12 months, you will be asked to come in the office for an in-office check.   Your device should last in the range of 6-12 years. This depends on many factors including how often it  paces the heart.   When the battery is low, a generator change will be performed. This is a same-day procedure with no postoperative activity restrictions.     Any need to reschedule or cancel procedures or appointments, or any questions regarding your procedures should be addressed with the Arrhythmia Department Nurses at: [phone].

## 2023-06-07 NOTE — NURSING
@1335 Pt arrived to room via stretcher. AAO. S/P dual ICD placement to upper left chest. Sling and swath in place to left arm. Received report from Arabella JHAVERI RN and Robin PRATER. AAOx3. VSS, no c/o pain or discomfort at this time, resp even and unlabored on RA. Aquacel dressing over sutures and dermabond to skin. Secondary pressure dressing to left upper chest in place. All dressing are dry, clean, and intact. No active bleeding. No hematoma noted. Post procedure protocol reviewed with patient and patient's family. Understanding verbalized. Monitoring per post procedure protocol.

## 2023-06-07 NOTE — H&P
Electrophysiology Pre Procedure H&P    HPI:   I had the pleasure of seeing Mr. Song today in our electrophysiology clinic in follow-up for his atrial arrhythmia and systolic heart failure. As you are aware he is a pleasant 78 year-old man with coronary artery disease (RCA , PCI to LCX/PDA) with a LVEF of 45%, hypertension, multiple sclerosis, stage 4 chronic kidney disease, blood loss anemia, and atrial fibrillation/flutter despite amiodarone therapy. His AF was originally observed in July of 2021 which persisted. A holter monitor from August 2021 noted rate controlled AF. His EF was observed to decline to 30%. He underwent DCCV in 9/2021 with early recurrence of AF (EF noted to be 25% on KERRY). He was initiated on amiodarone at a follow-up visit with Dr. Jones in November of 2021. He was then observed to be in sinus rhythm in February of 2022. An echocardiogram in March of 2022 noted improvement in his LVEF to 45%. He was admitted to the hospital in April of 2022 with fatigue, anemia, and melena requiring transfusions. EGD/colonoscopy were unrevealing. Video capsule was also unrevealing. His AF recurred at this time for which he presented in 6/2022. We discussed trial again of DCCV with increased amiodarone dosing versus PVI. They were not interested. We stopped amiodarone and increased metoprolol.     7/2022: ECHO noted LVEF of 35%     10/2022 holter: sinus rhythm with average rate of 57 bpm. Seen by Farhana Gonzales. PET stress ordered.     11/2022: PET stress test noted fixed perfusion abnormalities. Seen by Dr. Ly in interventional clinic. Plan for medical management.     3/2023 ECHO: LVEF 25-30%.       Mr. Song returns for follow-up. He has no current complaints.     I reviewed available ECGs which show sinus rhythm or AF.     My interpretation of today's in clinic ECG is sinus rhythm with a rate of 57 bpm.     Interim Hx:  Patient denies chest pain fevers chills SOB. Last dose eliquis  Sunday    Past Medical History:   Diagnosis Date    Bilateral renal cysts     CHF (congestive heart failure)     CKD (chronic kidney disease) stage 4, GFR 15-29 ml/min     Hematuria, unspecified     Hypertension     Iron deficiency anemia     Personal history of colonic polyps 01/01/2005    Proteinuria     PVD (peripheral vascular disease)     Vitamin D deficiency         Social History     Socioeconomic History    Marital status:    Tobacco Use    Smoking status: Some Days     Packs/day: 0.25     Types: Cigarettes    Smokeless tobacco: Never   Substance and Sexual Activity    Alcohol use: No     Alcohol/week: 0.0 standard drinks    Drug use: Never        Family History   Problem Relation Age of Onset    Prostate cancer Brother 59        Current Facility-Administered Medications   Medication Dose Route Frequency Provider Last Rate Last Admin    ceFAZolin 2 g in dextrose 5 % in water (D5W) 5 % 50 mL IVPB (MB+)  2 g Intravenous On Call Procedure Kaylan Winston NP          ROS:  Constitutional: (-)fevers, (-)chills, (-)night sweats  HEENT: (-) headaches (-) lightheadedness (-) blurry vision  Cardiovascular: (-)chest pain (-)paroxysmal nocturnal dyspnea (-)orthopnea  Respiratory: (-)shortness of breath, (-)dyspnea on exertion (-)hemoptysis  Gastrointestinal: (-)abdominal pain (-)nausea (-)vomiting (-)tarry stools  Musculoskeletal: (-)arthralgias (-)limited range of motion  Neurologic: (-)parasthesias (-)mood disorder (-)anxiety  Endo: (-)polyuria (-)polydipsia (-)heat/cold intolerance  Skin: (-)rash     Vitals reviewed and stable  Physical Exam:   Gen: no acute distress, pleasant patient answering questions appropriately  HEENT: extra-ocular muscles intact, normocephalic-atraumatic  Neck: no jugular venous distension, no lymphadenopathy, no thyromegaly, no carotid bruits  CVS: regular rate and rhythm, S1S2 normal, no murmurs/rubs/gallops  CHEST: clear to auscultation bilaterally, no wheezes/rales/ronchi  ABD:  Soft, non-tender, nondistended, bowel sounds present  EXT: No Edema, 2+ pulses bilaterally (radial, femoral, dorsales pedis, posterior tibial)  NEURO: awake, alert, and oriented   Skin: No rash     Review of Labs:   Labs at baseline    Most recent ECG  Pending     Assessment/Plan:  Rex Song is a 78 y.o. male with CHF R EF 25-30% NYHA II dcICD  SJ  Anesthesia  Hold eliquis 48 hours prior  Hold irbesartan AM of procedure  Lower eliquis to 2.5mg bid (age/creatinine)

## 2023-06-07 NOTE — NURSING
Pt ambulated on unit. Steady gait noted. Pt voids into urinal without issue. VSS and pt ready for discharge. Wife at bedside.

## 2023-06-07 NOTE — BRIEF OP NOTE
: Nemesio Raza MD  Date of procedure: 6/7/2023  Post-operative Diagnosis: Heart Failure    Procedure Performed: dual chamber ICD implant    Description of Procedure: The patient was brought to the EP lab in the fasting state. Prepped and draped in sterile fashion. Safety timeout was performed. Sedation administered by anesthesia staff. Selective venogram of the left axillary and cephalic veins performed via left ac IV. Lidocaine used for local anesthetic. Fluoroscopic guided axillary access utilized. Guide/J Wire advanced and confirmed in IVC. Incision made. Blunt and electrocautery dissection performed. Pocket made.  Second fluoroscopic guided axillary access obtained. Guide/J wire advanced and confirmed in IVC. Peel away sheath advanced over J wire. ICD lead advanced into RV. R waves and injury pattern adequate. Lead fixed into place and sutured in place. Second peel away sheath advanced over J wire. RA lead advanced into RA via peel away sheath. After adequate p waves and current of injury detected, the RA lead was fixed into place in the RA appendage. Peel away sheath removed and RA lead sutured into place. Pocket washed using antibiotic solution. Leads connected to generator. Generator placed into pocket, sutured in place, and washed with antibiotic solution. Deep layer closed with interrupted 3-0 suture. Intermediate layer closed with running 3-0 suture. Superficial layer closed with running 4-0 suture. Skin closed with Dermabond. Meplex dressing to be placed after dermabond has dried.     EBL: <10 mL    Specimens Removed: None  Complications: no immediate    Sling to left arm - wear for 24 hours, then only at night for 6 weeks.  NO HEPARIN PRODUCTS No ELIQUIS for 5 days  Doxycycline 100 mg PO BID for 5 days at discharge  No lifting left elbow above shoulder height  No lifting over 5 pounds  No driving for 1 week and for 4 weeks if patient uses left arm to make turns  Patient may shower in 24  hours, do not let beam of shower hit site directly and no scrubbing in area  Follow up in device clinic in 1 week and with Dr. Raza in 4 months.     Dr Raza, the attending electrophysiologist, was present for the entirety of this procedure.    Germain Steele MD PGY8

## 2023-06-07 NOTE — PATIENT INSTRUCTIONS
Remove white pressure bandage tomorrow 6/8/23.  Sling to left arm - wear for 24 hours, then only at night for 6 weeks.  NO HEPARIN PRODUCTS HOLD ELIQUIS FOR 5 days AFTER PROCEDURE  Doxycycline 100 mg PO BID for 5 days at discharge  No lifting left elbow above shoulder height  No lifting over 5 pounds  No driving for 1 week and for 4 weeks if patient uses left arm to make turns  Patient may shower in 24 hours, do not let beam of shower hit site directly and no scrubbing in area  Follow up in device clinic in 1 week and with Dr. Raza in 4 months.

## 2023-06-07 NOTE — DISCHARGE SUMMARY
[facility] - Cardiology  Cardiology  Discharge Summary      Patient Name: Rex Song  MRN: [id number]  Admission Date: 6/7/2023  Hospital Length of Stay: 0 days  Discharge Date and Time:  06/07/2023 2:11 PM  Attending Physician: Nemesio Raza MD  Discharging Provider: Germain Steele MD  Primary Care Physician: [facility]    HPI: I had the pleasure of seeing Mr. Song today in our electrophysiology clinic in follow-up for his atrial arrhythmia and systolic heart failure. As you are aware he is a pleasant 78 year-old man with coronary artery disease (RCA , PCI to LCX/PDA) with a LVEF of 45%, hypertension, multiple sclerosis, stage 4 chronic kidney disease, blood loss anemia, and atrial fibrillation/flutter despite amiodarone therapy. His AF was originally observed in July of 2021 which persisted. A holter monitor from August 2021 noted rate controlled AF. His EF was observed to decline to 30%. He underwent DCCV in 9/2021 with early recurrence of AF (EF noted to be 25% on KERRY). He was initiated on amiodarone at a follow-up visit with Dr. Jones in November of 2021. He was then observed to be in sinus rhythm in February of 2022. An echocardiogram in March of 2022 noted improvement in his LVEF to 45%. He was admitted to the hospital in April of 2022 with fatigue, anemia, and melena requiring transfusions. EGD/colonoscopy were unrevealing. Video capsule was also unrevealing. His AF recurred at this time for which he presented in 6/2022. We discussed trial again of DCCV with increased amiodarone dosing versus PVI. They were not interested. We stopped amiodarone and increased metoprolol.     7/2022: ECHO noted LVEF of 35%     10/2022 holter: sinus rhythm with average rate of 57 bpm. Seen by Farhana Gonzales. PET stress ordered.     11/2022: PET stress test noted fixed perfusion abnormalities. Seen by Dr. Ly in interventional clinic. Plan for medical management.     3/2023 ECHO: LVEF  25-30%.        Mr. Song returns for follow-up. He has no current complaints.     I reviewed available ECGs which show sinus rhythm or AF.     My interpretation of today's in clinic ECG is sinus rhythm with a rate of 57 bpm.     Interim Hx:  Patient denies chest pain fevers chills SOB. Last dose eliquis Sunday    Procedure(s) (LRB):  Insertion, ICD, Dual Chamber (Left)     Indwelling Lines/Drains at time of discharge:  Lines/Drains/Airways       None                   Hospital Course (synopsis of major diagnoses, care, treatment, and services provided during the course of the hospital stay): Patient arrived fasting for ICD placement. Patient tolerated procedure well. Monitored post op and discharged.       Pending Diagnostic Studies:       Procedure Component Value Units Date/Time    EKG 12-lead [[id number]]     Order Status: Sent Lab Status: No result     X-Ray Chest AP Portable [[id number]] Resulted: 06/07/23 1412    Order Status: Sent Lab Status: In process Updated: 06/07/23 1411            There are no hospital problems to display for this patient.      Discharged Condition: good    Follow Up:   Follow-up Information       DEVICE CHECK CLINIC Follow up in 1 week(s).               Nemesio Raza MD Follow up in 3 month(s).    Specialties: Electrophysiology, Cardiology  Contact information:  [facility] [id number] [phone]                           Patient Instructions:   Remove white pressure bandage tomorrow 6/8/23.  Sling to left arm - wear for 24 hours, then only at night for 6 weeks.  NO HEPARIN PRODUCTS HOLD ELIQUIS FOR 5 days AFTER PROCEDURE  Doxycycline 100 mg PO BID for 5 days at discharge  No lifting left elbow above shoulder height  No lifting over 5 pounds  No driving for 1 week and for 4 weeks if patient uses left arm to make turns  Patient may shower in 24 hours, do not let beam of shower hit site directly and no scrubbing in area  Follow up in device clinic in 1 week and with  Dr. Raza in 4 months.   Medications:  Reconciled Home Medications:      Medication List        START taking these medications      doxycycline 100 MG Cap  Commonly known as: VIBRAMYCIN  Take 1 capsule (100 mg total) by mouth every 12 (twelve) hours. for 5 days            CHANGE how you take these medications      apixaban 2.5 mg Tab  Commonly known as: ELIQUIS  Take 1 tablet (2.5 mg total) by mouth 2 (two) times daily.  Start taking on: June 13, 2023  What changed: These instructions start on June 13, 2023. If you are unsure what to do until then, ask your doctor or other care provider.            CONTINUE taking these medications      amLODIPine 5 MG tablet  Commonly known as: NORVASC  TAKE 1 TABLET EVERY DAY     cholecalciferol (vitamin D3) 25 mcg (1,000 unit) capsule  Commonly known as: VITAMIN D3  Take 1 capsule (1,000 Units total) by mouth once daily.     clopidogreL 75 mg tablet  Commonly known as: PLAVIX  TAKE 1 TABLET EVERY DAY     ferrous sulfate 325 (65 FE) MG EC tablet  Take 1 tablet (325 mg total) by mouth once daily.     irbesartan 150 MG tablet  Commonly known as: AVAPRO  Take 1 tablet (150 mg total) by mouth every evening.     levothyroxine 25 MCG tablet  Commonly known as: SYNTHROID  Take 1 tablet (25 mcg total) by mouth before breakfast.     metoprolol succinate 50 MG 24 hr tablet  Commonly known as: TOPROL-XL  Take 1 tablet (50 mg total) by mouth once daily.     nitroGLYCERIN 0.4 MG SL tablet  Commonly known as: NITROSTAT  Place 1 tablet (0.4 mg total) under the tongue every 5 (five) minutes as needed.     omeprazole 40 MG capsule  Commonly known as: PRILOSEC  TAKE 1 CAPSULE EVERY MORNING     rosuvastatin 40 MG Tab  Commonly known as: CRESTOR  TAKE 1 TABLET BY MOUTH EVERY DAY              Time spent on the discharge of patient: 30 minutes    Germain Steele MD  Cardiology  [facility] - Cardiology

## 2023-06-07 NOTE — ANESTHESIA PREPROCEDURE EVALUATION
06/07/2023  Rex Song is a 78 y.o., male.  Pre-operative evaluation for Procedure(s) (LRB):  Insertion, ICD, Dual Chamber (Left)    Rex Song is a 78 y.o. male     Patient Active Problem List   Diagnosis    CAD (coronary artery disease)    Post PTCA    Essential hypertension    Mixed hyperlipidemia    AAA (abdominal aortic aneurysm)    PVD (peripheral vascular disease)    Multiple sclerosis    Dysphagia    Colon cancer screening    Benign neoplasm of colon    Screening for colon cancer    CKD (chronic kidney disease) stage 4, GFR 15-29 ml/min    Tobacco abuse    Complex renal cyst    Atrial fibrillation    HFrEF (heart failure with reduced ejection fraction)    Acute renal injury    Hypothyroidism    Thrombocytopenia    GI bleed    Acute blood loss anemia    Anemia    Weakness    Other iron deficiency anemias       Review of patient's allergies indicates:   Allergen Reactions    Cortisone      Other reaction(s): Itching       No current facility-administered medications on file prior to encounter.     Current Outpatient Medications on File Prior to Encounter   Medication Sig Dispense Refill    amLODIPine (NORVASC) 5 MG tablet TAKE 1 TABLET EVERY DAY 90 tablet 3    cholecalciferol, vitamin D3, (VITAMIN D3) 25 mcg (1,000 unit) capsule Take 1 capsule (1,000 Units total) by mouth once daily. 30 capsule 1    clopidogreL (PLAVIX) 75 mg tablet TAKE 1 TABLET EVERY DAY 90 tablet 3    irbesartan (AVAPRO) 150 MG tablet Take 1 tablet (150 mg total) by mouth every evening. 90 tablet 3    levothyroxine (SYNTHROID) 25 MCG tablet Take 1 tablet (25 mcg total) by mouth before breakfast. 30 tablet 11    metoprolol succinate (TOPROL-XL) 50 MG 24 hr tablet Take 1 tablet (50 mg total) by mouth once daily. 90 tablet 3    apixaban (ELIQUIS) 2.5 mg Tab Take 1 tablet (2.5 mg total) by  mouth 2 (two) times daily. 180 tablet 3    ferrous sulfate 325 (65 FE) MG EC tablet Take 1 tablet (325 mg total) by mouth once daily. 30 tablet 3    nitroGLYCERIN (NITROSTAT) 0.4 MG SL tablet Place 1 tablet (0.4 mg total) under the tongue every 5 (five) minutes as needed. 25 tablet 5       Past Surgical History:   Procedure Laterality Date    COLONOSCOPY N/A 2022    Procedure: COLONOSCOPY;  Surgeon: Earl Hall MD;  Location: [facility] ENDO;  Service: Endoscopy;  Laterality: N/A;    CORONARY ANGIOPLASTY WITH STENT PLACEMENT  2000    LCX stent/ PDA stent    ESOPHAGOGASTRODUODENOSCOPY N/A 2022    Procedure: EGD (ESOPHAGOGASTRODUODENOSCOPY);  Surgeon: Yosvany Dominique MD;  Location: [facility];  Service: Endoscopy;  Laterality: N/A;    INTRALUMINAL GASTROINTESTINAL TRACT IMAGING VIA CAPSULE N/A 2022    Procedure: IMAGING PROCEDURE, GI TRACT, INTRALUMINAL, VIA CAPSULE;  Surgeon: Yosvany Dominique MD;  Location: [facility];  Service: Endoscopy;  Laterality: N/A;       Social History     Socioeconomic History    Marital status:    Tobacco Use    Smoking status: Some Days     Packs/day: 0.25     Types: Cigarettes    Smokeless tobacco: Never   Substance and Sexual Activity    Alcohol use: No     Alcohol/week: 0.0 standard drinks    Drug use: Never         CBC: No results for input(s): WBC, RBC, HGB, HCT, PLT, MCV, MCH, MCHC in the last 72 hours.    CMP: No results for input(s): NA, K, CL, CO2, BUN, CREATININE, GLU, MG, PHOS, CALCIUM, ALBUMIN, PROT, ALKPHOS, ALT, AST, BILITOT in the last 72 hours.    INR  No results for input(s): PT, INR, PROTIME, APTT in the last 72 hours.        Diagnostic Studies:      EKD Echo:  Results for orders placed or performed during the hospital encounter of 06/24/15   2D Echo w/ Color Flow Doppler   Result Value Ref Range    EF + QEF 50 55 - 65    Diastolic Dysfunction No     Aortic Valve Regurgitation TRIVIAL     Est. PA Systolic Pressure 20.81      Mitral Valve Mobility NORMAL     Tricuspid Valve Regurgitation TRIVIAL            Pre-op Assessment    I have reviewed the Patient Summary Reports.     I have reviewed the Nursing Notes. I have reviewed the NPO Status.   I have reviewed the Medications.     Review of Systems  Cardiovascular:   Hypertension CAD   CHF    Renal/:   Chronic Renal Disease        Physical Exam    Airway:  Mallampati: II           Anesthesia Plan  Type of Anesthesia, risks & benefits discussed:    Anesthesia Type: Gen Natural Airway  Intra-op Monitoring Plan: Standard ASA Monitors  Post Op Pain Control Plan: multimodal analgesia  Induction:  IV  Airway Plan: Direct, Post-Induction  Informed Consent: Informed consent signed with the Patient and all parties understand the risks and agree with anesthesia plan.  All questions answered.   ASA Score: 3  Day of Surgery Review of History & Physical: H&P Update referred to the surgeon/provider.    Ready For Surgery From Anesthesia Perspective.     .

## 2023-06-07 NOTE — TRANSFER OF CARE
Anesthesia Transfer of Care Note    Patient: Rex Song    Procedure(s) Performed: Procedure(s) (LRB):  Insertion, ICD, Dual Chamber (Left)    Patient location: PACU    Anesthesia Type: general    Transport from OR: Transported from OR on 6-10 L/min O2 by face mask with adequate spontaneous ventilation    Post pain: adequate analgesia    Post assessment: no apparent anesthetic complications and tolerated procedure well    Post vital signs: stable    Level of consciousness: awake and alert    Nausea/Vomiting: no nausea/vomiting    Complications: none    Transfer of care protocol was followed      Last vitals: There were no vitals taken for this visit.

## 2023-06-08 NOTE — ANESTHESIA POSTPROCEDURE EVALUATION
Anesthesia Post Evaluation    Patient: Rex Song    Procedure(s) Performed: Procedure(s) (LRB):  Insertion, ICD, Dual Chamber (Left)    Final Anesthesia Type: general      Patient location during evaluation: PACU  Patient participation: Yes- Able to Participate  Level of consciousness: awake and alert, awake and oriented  Post-procedure vital signs: reviewed and stable  Pain management: adequate  Airway patency: patent    PONV status at discharge: No PONV  Anesthetic complications: no      Cardiovascular status: blood pressure returned to baseline, hemodynamically stable and stable  Respiratory status: unassisted, spontaneous ventilation and room air  Hydration status: euvolemic  Follow-up not needed.          Vitals Value Taken Time   /63 06/07/23 1635   Temp 37.1 °C (98.8 °F) 06/07/23 1335   Pulse 79 06/07/23 1643   Resp 28 06/07/23 1643   SpO2 94 % 06/07/23 1643   Vitals shown include unvalidated device data.      No case tracking events are documented in the log.      Pain/Al Score: Al Score: 9 (6/7/2023  2:20 PM)

## 2023-06-09 DIAGNOSIS — I50.20 HFREF (HEART FAILURE WITH REDUCED EJECTION FRACTION): Primary | ICD-10-CM

## 2023-06-14 ENCOUNTER — CLINICAL SUPPORT (OUTPATIENT)
Dept: CARDIOLOGY | Facility: HOSPITAL | Age: 78
End: 2023-06-14
Attending: INTERNAL MEDICINE
Payer: MEDICARE

## 2023-06-14 DIAGNOSIS — I25.5 ISCHEMIC CARDIOMYOPATHY: ICD-10-CM

## 2023-06-14 DIAGNOSIS — I25.2 HISTORY OF MI (MYOCARDIAL INFARCTION): ICD-10-CM

## 2023-06-14 DIAGNOSIS — I50.20 HFREF (HEART FAILURE WITH REDUCED EJECTION FRACTION): ICD-10-CM

## 2023-06-14 PROCEDURE — 93283 PRGRMG EVAL IMPLANTABLE DFB: CPT | Mod: 26,,, | Performed by: INTERNAL MEDICINE

## 2023-06-14 PROCEDURE — 93283 CARDIAC DEVICE CHECK - IN CLINIC & HOSPITAL: ICD-10-PCS | Mod: 26,,, | Performed by: INTERNAL MEDICINE

## 2023-06-14 PROCEDURE — 93283 PRGRMG EVAL IMPLANTABLE DFB: CPT

## 2023-07-21 DIAGNOSIS — I25.10 CORONARY ARTERY DISEASE INVOLVING NATIVE CORONARY ARTERY WITHOUT ANGINA PECTORIS, UNSPECIFIED WHETHER NATIVE OR TRANSPLANTED HEART: ICD-10-CM

## 2023-07-21 DIAGNOSIS — I10 ESSENTIAL HYPERTENSION: ICD-10-CM

## 2023-07-24 RX ORDER — METOPROLOL SUCCINATE 50 MG/1
TABLET, EXTENDED RELEASE ORAL
Qty: 90 TABLET | Refills: 3 | Status: SHIPPED | OUTPATIENT
Start: 2023-07-24 | End: 2024-01-25 | Stop reason: DRUGHIGH

## 2023-08-15 DIAGNOSIS — I48.0 PAROXYSMAL ATRIAL FIBRILLATION: Chronic | ICD-10-CM

## 2023-08-18 NOTE — TELEPHONE ENCOUNTER
----- Message from Reshma Jones MD sent at 8/3/2022  3:30 PM CDT -----  Please call Mr Song re echocardiogram with pumping function of heart at 35%. Please check BP. Will titrate medication as room on BP. FUP in clinic 1 month to titrate medication. Repeat echo 3 month after medication titration   - routine obs, patient not at acute risk of self harm  - recommend to pt to practice deep breathing with incentive spirometer if feeling anxious  - if anxiety persists, can give ativan 1 mg q6h PO PRN anxiety, or ativan 1 mg q6h IV PRN   - routine obs, patient not at acute risk of self harm  - recommend to pt to practice deep breathing with incentive spirometer if feeling anxious  - if anxiety persists, can give xanax 0.25 mg PO q6h PRN anxiety

## 2023-09-29 ENCOUNTER — CLINICAL SUPPORT (OUTPATIENT)
Dept: CARDIOLOGY | Facility: HOSPITAL | Age: 78
End: 2023-09-29
Payer: MEDICARE

## 2023-09-29 DIAGNOSIS — I25.5 ISCHEMIC CARDIOMYOPATHY: ICD-10-CM

## 2023-09-29 DIAGNOSIS — I48.91 UNSPECIFIED ATRIAL FIBRILLATION: ICD-10-CM

## 2023-09-29 DIAGNOSIS — Z95.810 PRESENCE OF AUTOMATIC (IMPLANTABLE) CARDIAC DEFIBRILLATOR: ICD-10-CM

## 2023-09-29 PROCEDURE — 93296 REM INTERROG EVL PM/IDS: CPT | Mod: HCNC | Performed by: INTERNAL MEDICINE

## 2023-09-29 PROCEDURE — 93295 CARDIAC DEVICE CHECK - REMOTE: ICD-10-PCS | Mod: HCNC,,, | Performed by: INTERNAL MEDICINE

## 2023-09-29 PROCEDURE — 93295 DEV INTERROG REMOTE 1/2/MLT: CPT | Mod: HCNC,,, | Performed by: INTERNAL MEDICINE

## 2023-10-20 PROBLEM — Z79.01 ON ANTICOAGULANT THERAPY: Status: ACTIVE | Noted: 2023-10-20

## 2023-10-20 PROBLEM — Z95.810 ICD (IMPLANTABLE CARDIOVERTER-DEFIBRILLATOR) IN PLACE: Status: ACTIVE | Noted: 2023-10-20

## 2023-10-20 NOTE — PROGRESS NOTES
Mr. Song is a patient of Dr. Raza and was last seen in clinic 4/18/2023.      Subjective:   Patient ID:  Rex Song is an 78 y.o. male who presents for follow up of Atrial Fibrillation and ICD  .     HPI:    Mr. Song is an 78 y.o. male with CAD (PCI), ICM, MS, CKD, chronic anemia, AF/AFL here for follow up after ICD implantation.    Background:    Mr. Song has a hx of coronary artery disease (RCA , PCI to LCX/PDA) with a LVEF of 45%, hypertension, multiple sclerosis, stage 4 chronic kidney disease, blood loss anemia, and atrial fibrillation/flutter despite amiodarone therapy. His AF was originally observed in July of 2021 which persisted. A holter monitor from August 2021 noted rate controlled AF. His EF was observed to decline to 30%. He underwent DCCV in 9/2021 with early recurrence of AF (EF noted to be 25% on KERRY). He was initiated on amiodarone at a follow-up visit with Dr. Jones in November of 2021. He was then observed to be in sinus rhythm in February of 2022. An echocardiogram in March of 2022 noted improvement in his LVEF to 45%. He was admitted to the hospital in April of 2022 with fatigue, anemia, and melena requiring transfusions. EGD/colonoscopy were unrevealing. Video capsule was also unrevealing. His AF recurred at this time for which he presented in 6/2022. We discussed trial again of DCCV with increased amiodarone dosing versus PVI. They were not interested. We stopped amiodarone and increased metoprolol.    7/2022: ECHO noted LVEF of 35%    10/2022 holter: sinus rhythm with average rate of 57 bpm. Seen by Farhana Gonzales. PET stress ordered.    11/2022: PET stress test noted fixed perfusion abnormalities. Seen by Dr. Ly in interventional clinic. Plan for medical management.    3/2023 ECHO: LVEF 25-30%.    4/2023: Mr. Song returns for follow-up. He has no current complaints.  ECGs which show sinus rhythm or AF.  ECG is sinus rhythm with a rate of 57 bpm.  In summary,  Mr. Song  is a pleasant 78 year-old man with coronary artery disease (RCA , PCI to LCX/PDA) with a LVEF of 25-30%, hypertension, multiple sclerosis, stage 4 chronic kidney disease, blood loss anemia, and atrial fibrillation/flutter despite amiodarone therapy. Plan is to now do rate control. He is paroxysmal. His EF has declined despite sinus rhythm and despite being on GDMT. He is not a candidate for PCI. This patient has a chronic ischemic systolic cardiomyopathy with a prior myocardial infarction which occurred years ago and he is not a candidate for revascularization per interventional cardiology and an LVEF of 25-30% with NYHA class 2 heart failure symptoms that has persisted despite more than 3 months of maximally tolerated goal directed medical therapy consisting of metoprolol and irbesartan. We discussed the etiology and pathophysiology of sudden cardiac death in a patient population such as his and how an ICD may provide mortality benefit. Pt elected to proceed.    Update (10/25/2023):    6/7/2023: Successful implantation of ICD Dual placed for primary intervention.    Today he says he feels well. No significant change in symptoms. Denies CP, REBOLLEDO, edema, palpitations, LH, syncope.    He is currently taking eliquis 2.5mg BID (age, CKD) for stroke prophylaxis and denies significant bleeding episodes. He is currently being treated with toprol 50mg daily for HR control.  Kidney function is low, with a creatinine of 1.82 on 10/3/2023.    Device Interrogation (10/25/2023) reveals an intrinsic SR/SB with stable lead and device function. AMXx8, max 7 hours 6/29/23. <1% burden. No ventricular arrhythmias or treated episodes were noted.  He paces 31% in the RA and <1% in the RV. Estimated battery longevity 6.4-8 years.     I have personally reviewed the patient's EKG today, which shows SR/AP/VS at 71bpm. NY interval is 180. QRS is 90. QTc is 430.    Relevant Cardiac Test Results:    2D Echo (3/22/2023):  The  left ventricle is mildly enlarged measuring 5.3 cm with severely decreased systolic function.  There is moderate left ventricular global hypokinesis. The estimated ejection fraction is 25-30%. Additionally the basal inferior and inferolateral walls are akinetic.  Normal right ventricular size with normal right ventricular systolic function.  Grade I left ventricular diastolic dysfunction.  Normal central venous pressure (3 mmHg).    Current Outpatient Medications   Medication Sig    amLODIPine (NORVASC) 5 MG tablet TAKE 1 TABLET EVERY DAY    apixaban (ELIQUIS) 2.5 mg Tab Take 1 tablet (2.5 mg total) by mouth 2 (two) times daily.    cholecalciferol, vitamin D3, (VITAMIN D3) 25 mcg (1,000 unit) capsule Take 1 capsule (1,000 Units total) by mouth once daily.    clopidogreL (PLAVIX) 75 mg tablet TAKE 1 TABLET EVERY DAY    irbesartan (AVAPRO) 150 MG tablet Take 1 tablet (150 mg total) by mouth every evening.    levothyroxine (SYNTHROID) 25 MCG tablet Take 1 tablet (25 mcg total) by mouth before breakfast.    metoprolol succinate (TOPROL-XL) 50 MG 24 hr tablet TAKE 1 TABLET ONE TIME DAILY    nitroGLYCERIN (NITROSTAT) 0.4 MG SL tablet Place 1 tablet (0.4 mg total) under the tongue every 5 (five) minutes as needed.    omeprazole (PRILOSEC) 40 MG capsule TAKE 1 CAPSULE EVERY MORNING    rosuvastatin (CRESTOR) 40 MG Tab TAKE 1 TABLET BY MOUTH EVERY DAY     No current facility-administered medications for this visit.       Review of Systems   Constitutional: Negative for malaise/fatigue.   Cardiovascular:  Negative for chest pain, dyspnea on exertion, irregular heartbeat, leg swelling and palpitations.   Respiratory:  Negative for shortness of breath.    Hematologic/Lymphatic: Negative for bleeding problem.   Skin:  Negative for rash.   Musculoskeletal:  Negative for myalgias.   Gastrointestinal:  Negative for hematemesis, hematochezia and nausea.   Genitourinary:  Negative for hematuria.   Neurological:  Negative for  "light-headedness.   Psychiatric/Behavioral:  Negative for altered mental status.    Allergic/Immunologic: Negative for persistent infections.       Objective:          Ht 5' 6" (1.676 m)   Wt 61.8 kg (136 lb 3.9 oz)   BMI 21.99 kg/m²     Physical Exam  Vitals and nursing note reviewed.   Constitutional:       Appearance: Normal appearance. He is well-developed.   HENT:      Head: Normocephalic.      Nose: Nose normal.   Eyes:      Pupils: Pupils are equal, round, and reactive to light.   Cardiovascular:      Rate and Rhythm: Normal rate and regular rhythm.   Pulmonary:      Effort: No respiratory distress.      Breath sounds: Normal breath sounds.   Chest:      Comments: Device to LUCW. Incision and pocket in good repair.    Musculoskeletal:         General: Normal range of motion.   Skin:     General: Skin is warm and dry.      Findings: No erythema.   Neurological:      Mental Status: He is alert and oriented to person, place, and time.   Psychiatric:         Speech: Speech normal.         Behavior: Behavior normal.           Lab Results   Component Value Date     10/03/2023    K 4.7 10/03/2023    MG 2.2 10/03/2023    BUN 20 10/03/2023    CREATININE 1.82 (H) 10/03/2023    ALT 12 10/24/2022    AST 21 10/24/2022    HGB 9.2 (L) 10/03/2023    HCT 32.6 (L) 10/03/2023    TSH 23.839 (H) 04/11/2022    LDLCALC 43.6 (L) 04/11/2022       Recent Labs   Lab 05/31/23  1530   INR 1.0       Assessment:     1. ICD (implantable cardioverter-defibrillator) in place    2. Persistent atrial fibrillation    3. Essential hypertension    4. HFrEF (heart failure with reduced ejection fraction)    5. On anticoagulant therapy      Plan:     In summary, Mr. Song is an 78 y.o. male with CAD (PCI), ICM, MS, CKD, chronic anemia, AF/AFL here for follow up after ICD implantation.  He is 4.5 months s/p ICD implantation for primary prevention. Device and lead function WNL.   Paroxysmal AF/AFL in rate control strategy. On eliquis 2.5mg " BID - at appropriate dose for age and creatinine. Asymptomatic <1% AF burden. No ventricular arrhythmias.  No RV pacing. No CHF symptoms. He feels well with no complaints.     Continue current medication regimen and device settings.   Follow up in device clinic as scheduled.   Follow up in EP clinic in 6 mo, sooner as needed.     *A copy of this note has been sent to Dr. Raza*    Follow up in about 6 months (around 4/25/2024).    ------------------------------------------------------------------    LOUANN Evans, NP-C  Cardiac Electrophysiology

## 2023-10-25 ENCOUNTER — CLINICAL SUPPORT (OUTPATIENT)
Dept: CARDIOLOGY | Facility: HOSPITAL | Age: 78
End: 2023-10-25
Attending: INTERNAL MEDICINE
Payer: MEDICARE

## 2023-10-25 ENCOUNTER — OFFICE VISIT (OUTPATIENT)
Dept: ELECTROPHYSIOLOGY | Facility: CLINIC | Age: 78
End: 2023-10-25
Attending: INTERNAL MEDICINE
Payer: MEDICARE

## 2023-10-25 VITALS
HEART RATE: 60 BPM | WEIGHT: 136.25 LBS | HEIGHT: 66 IN | BODY MASS INDEX: 21.9 KG/M2 | DIASTOLIC BLOOD PRESSURE: 51 MMHG | SYSTOLIC BLOOD PRESSURE: 108 MMHG

## 2023-10-25 DIAGNOSIS — I10 ESSENTIAL HYPERTENSION: Chronic | ICD-10-CM

## 2023-10-25 DIAGNOSIS — I48.19 PERSISTENT ATRIAL FIBRILLATION: Chronic | ICD-10-CM

## 2023-10-25 DIAGNOSIS — Z95.810 ICD (IMPLANTABLE CARDIOVERTER-DEFIBRILLATOR) IN PLACE: Primary | ICD-10-CM

## 2023-10-25 DIAGNOSIS — Z79.01 ON ANTICOAGULANT THERAPY: ICD-10-CM

## 2023-10-25 DIAGNOSIS — I50.20 HFREF (HEART FAILURE WITH REDUCED EJECTION FRACTION): Chronic | ICD-10-CM

## 2023-10-25 DIAGNOSIS — I50.20 HFREF (HEART FAILURE WITH REDUCED EJECTION FRACTION): ICD-10-CM

## 2023-10-25 DIAGNOSIS — I48.91 ATRIAL FIBRILLATION, UNSPECIFIED TYPE: ICD-10-CM

## 2023-10-25 PROCEDURE — 93005 ELECTROCARDIOGRAM TRACING: CPT | Mod: S$GLB,,, | Performed by: INTERNAL MEDICINE

## 2023-10-25 PROCEDURE — 1126F PR PAIN SEVERITY QUANTIFIED, NO PAIN PRESENT: ICD-10-PCS | Mod: CPTII,S$GLB,, | Performed by: NURSE PRACTITIONER

## 2023-10-25 PROCEDURE — 1159F MED LIST DOCD IN RCRD: CPT | Mod: CPTII,S$GLB,, | Performed by: NURSE PRACTITIONER

## 2023-10-25 PROCEDURE — 93283 CARDIAC DEVICE CHECK - IN CLINIC & HOSPITAL: ICD-10-PCS | Mod: 26,,, | Performed by: INTERNAL MEDICINE

## 2023-10-25 PROCEDURE — 93010 ELECTROCARDIOGRAM REPORT: CPT | Mod: S$GLB,,, | Performed by: INTERNAL MEDICINE

## 2023-10-25 PROCEDURE — 3288F FALL RISK ASSESSMENT DOCD: CPT | Mod: CPTII,S$GLB,, | Performed by: NURSE PRACTITIONER

## 2023-10-25 PROCEDURE — 1157F ADVNC CARE PLAN IN RCRD: CPT | Mod: CPTII,S$GLB,, | Performed by: NURSE PRACTITIONER

## 2023-10-25 PROCEDURE — 3078F PR MOST RECENT DIASTOLIC BLOOD PRESSURE < 80 MM HG: ICD-10-PCS | Mod: CPTII,S$GLB,, | Performed by: NURSE PRACTITIONER

## 2023-10-25 PROCEDURE — 1101F PR PT FALLS ASSESS DOC 0-1 FALLS W/OUT INJ PAST YR: ICD-10-PCS | Mod: CPTII,S$GLB,, | Performed by: NURSE PRACTITIONER

## 2023-10-25 PROCEDURE — 1160F PR REVIEW ALL MEDS BY PRESCRIBER/CLIN PHARMACIST DOCUMENTED: ICD-10-PCS | Mod: CPTII,S$GLB,, | Performed by: NURSE PRACTITIONER

## 2023-10-25 PROCEDURE — 93010 RHYTHM STRIP: ICD-10-PCS | Mod: S$GLB,,, | Performed by: INTERNAL MEDICINE

## 2023-10-25 PROCEDURE — 99999 PR PBB SHADOW E&M-EST. PATIENT-LVL III: CPT | Mod: PBBFAC,,, | Performed by: NURSE PRACTITIONER

## 2023-10-25 PROCEDURE — 99214 OFFICE O/P EST MOD 30 MIN: CPT | Mod: S$GLB,,, | Performed by: NURSE PRACTITIONER

## 2023-10-25 PROCEDURE — 3074F SYST BP LT 130 MM HG: CPT | Mod: CPTII,S$GLB,, | Performed by: NURSE PRACTITIONER

## 2023-10-25 PROCEDURE — 3288F PR FALLS RISK ASSESSMENT DOCUMENTED: ICD-10-PCS | Mod: CPTII,S$GLB,, | Performed by: NURSE PRACTITIONER

## 2023-10-25 PROCEDURE — 1126F AMNT PAIN NOTED NONE PRSNT: CPT | Mod: CPTII,S$GLB,, | Performed by: NURSE PRACTITIONER

## 2023-10-25 PROCEDURE — 1160F RVW MEDS BY RX/DR IN RCRD: CPT | Mod: CPTII,S$GLB,, | Performed by: NURSE PRACTITIONER

## 2023-10-25 PROCEDURE — 93283 PRGRMG EVAL IMPLANTABLE DFB: CPT

## 2023-10-25 PROCEDURE — 1101F PT FALLS ASSESS-DOCD LE1/YR: CPT | Mod: CPTII,S$GLB,, | Performed by: NURSE PRACTITIONER

## 2023-10-25 PROCEDURE — 3074F PR MOST RECENT SYSTOLIC BLOOD PRESSURE < 130 MM HG: ICD-10-PCS | Mod: CPTII,S$GLB,, | Performed by: NURSE PRACTITIONER

## 2023-10-25 PROCEDURE — 1159F PR MEDICATION LIST DOCUMENTED IN MEDICAL RECORD: ICD-10-PCS | Mod: CPTII,S$GLB,, | Performed by: NURSE PRACTITIONER

## 2023-10-25 PROCEDURE — 1157F PR ADVANCE CARE PLAN OR EQUIV PRESENT IN MEDICAL RECORD: ICD-10-PCS | Mod: CPTII,S$GLB,, | Performed by: NURSE PRACTITIONER

## 2023-10-25 PROCEDURE — 93283 PRGRMG EVAL IMPLANTABLE DFB: CPT | Mod: 26,,, | Performed by: INTERNAL MEDICINE

## 2023-10-25 PROCEDURE — 93005 RHYTHM STRIP: ICD-10-PCS | Mod: S$GLB,,, | Performed by: INTERNAL MEDICINE

## 2023-10-25 PROCEDURE — 99999 PR PBB SHADOW E&M-EST. PATIENT-LVL III: ICD-10-PCS | Mod: PBBFAC,,, | Performed by: NURSE PRACTITIONER

## 2023-10-25 PROCEDURE — 3078F DIAST BP <80 MM HG: CPT | Mod: CPTII,S$GLB,, | Performed by: NURSE PRACTITIONER

## 2023-10-25 PROCEDURE — 99214 PR OFFICE/OUTPT VISIT, EST, LEVL IV, 30-39 MIN: ICD-10-PCS | Mod: S$GLB,,, | Performed by: NURSE PRACTITIONER

## 2023-11-20 LAB
OHS CV AF BURDEN PERCENT: < 1
OHS CV DC REMOTE DEVICE TYPE: NORMAL
OHS CV RV PACING PERCENT: 1 %

## 2023-11-29 RX ORDER — OMEPRAZOLE 40 MG/1
CAPSULE, DELAYED RELEASE ORAL
Qty: 90 CAPSULE | Refills: 3 | Status: SHIPPED | OUTPATIENT
Start: 2023-11-29

## 2023-11-30 DIAGNOSIS — I73.9 PVD (PERIPHERAL VASCULAR DISEASE): Chronic | ICD-10-CM

## 2023-12-04 RX ORDER — CLOPIDOGREL BISULFATE 75 MG/1
75 TABLET ORAL DAILY
Qty: 90 TABLET | Refills: 3 | Status: SHIPPED | OUTPATIENT
Start: 2023-12-04

## 2023-12-07 ENCOUNTER — LAB VISIT (OUTPATIENT)
Dept: LAB | Facility: HOSPITAL | Age: 78
End: 2023-12-07
Attending: INTERNAL MEDICINE
Payer: MEDICARE

## 2023-12-07 DIAGNOSIS — D63.1 ANEMIA IN STAGE 4 CHRONIC KIDNEY DISEASE: ICD-10-CM

## 2023-12-07 DIAGNOSIS — N18.4 ANEMIA IN STAGE 4 CHRONIC KIDNEY DISEASE: ICD-10-CM

## 2023-12-07 LAB
BASOPHILS # BLD AUTO: 0.07 K/UL (ref 0–0.2)
BASOPHILS NFR BLD: 1.3 % (ref 0–1.9)
DIFFERENTIAL METHOD: ABNORMAL
EOSINOPHIL # BLD AUTO: 0.1 K/UL (ref 0–0.5)
EOSINOPHIL NFR BLD: 2.1 % (ref 0–8)
ERYTHROCYTE [DISTWIDTH] IN BLOOD BY AUTOMATED COUNT: 16.5 % (ref 11.5–14.5)
HCT VFR BLD AUTO: 28.5 % (ref 40–54)
HGB BLD-MCNC: 8 G/DL (ref 14–18)
IMM GRANULOCYTES # BLD AUTO: 0.01 K/UL (ref 0–0.04)
IMM GRANULOCYTES NFR BLD AUTO: 0.2 % (ref 0–0.5)
LYMPHOCYTES # BLD AUTO: 1 K/UL (ref 1–4.8)
LYMPHOCYTES NFR BLD: 18.2 % (ref 18–48)
MCH RBC QN AUTO: 21.7 PG (ref 27–31)
MCHC RBC AUTO-ENTMCNC: 28.1 G/DL (ref 32–36)
MCV RBC AUTO: 77 FL (ref 82–98)
MONOCYTES # BLD AUTO: 0.5 K/UL (ref 0.3–1)
MONOCYTES NFR BLD: 9 % (ref 4–15)
NEUTROPHILS # BLD AUTO: 3.7 K/UL (ref 1.8–7.7)
NEUTROPHILS NFR BLD: 69.2 % (ref 38–73)
NRBC BLD-RTO: 0 /100 WBC
PLATELET # BLD AUTO: 130 K/UL (ref 150–450)
PMV BLD AUTO: 12.4 FL (ref 9.2–12.9)
RBC # BLD AUTO: 3.68 M/UL (ref 4.6–6.2)
WBC # BLD AUTO: 5.32 K/UL (ref 3.9–12.7)

## 2023-12-07 PROCEDURE — 36415 COLL VENOUS BLD VENIPUNCTURE: CPT | Mod: HCNC | Performed by: INTERNAL MEDICINE

## 2023-12-07 PROCEDURE — 85025 COMPLETE CBC W/AUTO DIFF WBC: CPT | Mod: HCNC | Performed by: INTERNAL MEDICINE

## 2023-12-08 ENCOUNTER — TELEPHONE (OUTPATIENT)
Dept: GASTROENTEROLOGY | Facility: CLINIC | Age: 78
End: 2023-12-08
Payer: MEDICARE

## 2023-12-08 NOTE — TELEPHONE ENCOUNTER
----- Message from Farhana Diane sent at 12/8/2023  2:07 PM CST -----  Contact: shaun  Type:  Needs Medical Advice    Who Called: pt  Symptoms (please be specific): pt states his hemoglobin is 8.3 and his doctor wants him to make appt with Dr Dominique, please call right back to get his scheduled to be seen right away  Would the patient rather a call back or a response via [facility]? call  Best Call Back Number: [phone]  Additional Information:

## 2023-12-08 NOTE — TELEPHONE ENCOUNTER
Clinic appt scheduled with Jessica, wife, on Wednesday, December 13, 2023 at 830am.  Clinic address given and repeated correctly.

## 2023-12-11 DIAGNOSIS — N18.4 ANEMIA IN STAGE 4 CHRONIC KIDNEY DISEASE: Primary | ICD-10-CM

## 2023-12-11 DIAGNOSIS — D63.1 ANEMIA IN STAGE 4 CHRONIC KIDNEY DISEASE: Primary | ICD-10-CM

## 2023-12-13 ENCOUNTER — LAB VISIT (OUTPATIENT)
Dept: LAB | Facility: HOSPITAL | Age: 78
End: 2023-12-13
Attending: INTERNAL MEDICINE
Payer: MEDICARE

## 2023-12-13 ENCOUNTER — OFFICE VISIT (OUTPATIENT)
Dept: GASTROENTEROLOGY | Facility: CLINIC | Age: 78
End: 2023-12-13
Payer: MEDICARE

## 2023-12-13 VITALS
WEIGHT: 136.81 LBS | HEART RATE: 59 BPM | BODY MASS INDEX: 21.99 KG/M2 | HEIGHT: 66 IN | SYSTOLIC BLOOD PRESSURE: 119 MMHG | DIASTOLIC BLOOD PRESSURE: 57 MMHG

## 2023-12-13 DIAGNOSIS — D63.1 ANEMIA IN STAGE 4 CHRONIC KIDNEY DISEASE: ICD-10-CM

## 2023-12-13 DIAGNOSIS — N18.4 ANEMIA IN STAGE 4 CHRONIC KIDNEY DISEASE: ICD-10-CM

## 2023-12-13 DIAGNOSIS — D50.0 IRON DEFICIENCY ANEMIA DUE TO CHRONIC BLOOD LOSS: Primary | ICD-10-CM

## 2023-12-13 LAB
BASOPHILS # BLD AUTO: 0.08 K/UL (ref 0–0.2)
BASOPHILS NFR BLD: 1.1 % (ref 0–1.9)
DIFFERENTIAL METHOD: ABNORMAL
EOSINOPHIL # BLD AUTO: 0.1 K/UL (ref 0–0.5)
EOSINOPHIL NFR BLD: 1.5 % (ref 0–8)
ERYTHROCYTE [DISTWIDTH] IN BLOOD BY AUTOMATED COUNT: 16.4 % (ref 11.5–14.5)
HCT VFR BLD AUTO: 28.9 % (ref 40–54)
HGB BLD-MCNC: 8.1 G/DL (ref 14–18)
IMM GRANULOCYTES # BLD AUTO: 0.02 K/UL (ref 0–0.04)
IMM GRANULOCYTES NFR BLD AUTO: 0.3 % (ref 0–0.5)
LYMPHOCYTES # BLD AUTO: 0.8 K/UL (ref 1–4.8)
LYMPHOCYTES NFR BLD: 11.3 % (ref 18–48)
MCH RBC QN AUTO: 21.6 PG (ref 27–31)
MCHC RBC AUTO-ENTMCNC: 28 G/DL (ref 32–36)
MCV RBC AUTO: 77 FL (ref 82–98)
MONOCYTES # BLD AUTO: 0.7 K/UL (ref 0.3–1)
MONOCYTES NFR BLD: 9 % (ref 4–15)
NEUTROPHILS # BLD AUTO: 5.5 K/UL (ref 1.8–7.7)
NEUTROPHILS NFR BLD: 76.8 % (ref 38–73)
NRBC BLD-RTO: 0 /100 WBC
PLATELET # BLD AUTO: 138 K/UL (ref 150–450)
PMV BLD AUTO: 12.5 FL (ref 9.2–12.9)
RBC # BLD AUTO: 3.75 M/UL (ref 4.6–6.2)
WBC # BLD AUTO: 7.2 K/UL (ref 3.9–12.7)

## 2023-12-13 PROCEDURE — 99214 OFFICE O/P EST MOD 30 MIN: CPT | Mod: HCNC,S$GLB,, | Performed by: INTERNAL MEDICINE

## 2023-12-13 PROCEDURE — 1126F AMNT PAIN NOTED NONE PRSNT: CPT | Mod: HCNC,CPTII,S$GLB, | Performed by: INTERNAL MEDICINE

## 2023-12-13 PROCEDURE — 1157F PR ADVANCE CARE PLAN OR EQUIV PRESENT IN MEDICAL RECORD: ICD-10-PCS | Mod: HCNC,CPTII,S$GLB, | Performed by: INTERNAL MEDICINE

## 2023-12-13 PROCEDURE — 1159F PR MEDICATION LIST DOCUMENTED IN MEDICAL RECORD: ICD-10-PCS | Mod: HCNC,CPTII,S$GLB, | Performed by: INTERNAL MEDICINE

## 2023-12-13 PROCEDURE — 99999 PR PBB SHADOW E&M-EST. PATIENT-LVL III: CPT | Mod: PBBFAC,HCNC,, | Performed by: INTERNAL MEDICINE

## 2023-12-13 PROCEDURE — 1157F ADVNC CARE PLAN IN RCRD: CPT | Mod: HCNC,CPTII,S$GLB, | Performed by: INTERNAL MEDICINE

## 2023-12-13 PROCEDURE — 3074F PR MOST RECENT SYSTOLIC BLOOD PRESSURE < 130 MM HG: ICD-10-PCS | Mod: HCNC,CPTII,S$GLB, | Performed by: INTERNAL MEDICINE

## 2023-12-13 PROCEDURE — 1101F PR PT FALLS ASSESS DOC 0-1 FALLS W/OUT INJ PAST YR: ICD-10-PCS | Mod: HCNC,CPTII,S$GLB, | Performed by: INTERNAL MEDICINE

## 2023-12-13 PROCEDURE — 1101F PT FALLS ASSESS-DOCD LE1/YR: CPT | Mod: HCNC,CPTII,S$GLB, | Performed by: INTERNAL MEDICINE

## 2023-12-13 PROCEDURE — 3078F PR MOST RECENT DIASTOLIC BLOOD PRESSURE < 80 MM HG: ICD-10-PCS | Mod: HCNC,CPTII,S$GLB, | Performed by: INTERNAL MEDICINE

## 2023-12-13 PROCEDURE — 1159F MED LIST DOCD IN RCRD: CPT | Mod: HCNC,CPTII,S$GLB, | Performed by: INTERNAL MEDICINE

## 2023-12-13 PROCEDURE — 36415 COLL VENOUS BLD VENIPUNCTURE: CPT | Mod: HCNC | Performed by: INTERNAL MEDICINE

## 2023-12-13 PROCEDURE — 3288F FALL RISK ASSESSMENT DOCD: CPT | Mod: HCNC,CPTII,S$GLB, | Performed by: INTERNAL MEDICINE

## 2023-12-13 PROCEDURE — 3074F SYST BP LT 130 MM HG: CPT | Mod: HCNC,CPTII,S$GLB, | Performed by: INTERNAL MEDICINE

## 2023-12-13 PROCEDURE — 3078F DIAST BP <80 MM HG: CPT | Mod: HCNC,CPTII,S$GLB, | Performed by: INTERNAL MEDICINE

## 2023-12-13 PROCEDURE — 99999 PR PBB SHADOW E&M-EST. PATIENT-LVL III: ICD-10-PCS | Mod: PBBFAC,HCNC,, | Performed by: INTERNAL MEDICINE

## 2023-12-13 PROCEDURE — 3288F PR FALLS RISK ASSESSMENT DOCUMENTED: ICD-10-PCS | Mod: HCNC,CPTII,S$GLB, | Performed by: INTERNAL MEDICINE

## 2023-12-13 PROCEDURE — 1126F PR PAIN SEVERITY QUANTIFIED, NO PAIN PRESENT: ICD-10-PCS | Mod: HCNC,CPTII,S$GLB, | Performed by: INTERNAL MEDICINE

## 2023-12-13 PROCEDURE — 85025 COMPLETE CBC W/AUTO DIFF WBC: CPT | Mod: HCNC | Performed by: INTERNAL MEDICINE

## 2023-12-13 PROCEDURE — 99214 PR OFFICE/OUTPT VISIT, EST, LEVL IV, 30-39 MIN: ICD-10-PCS | Mod: HCNC,S$GLB,, | Performed by: INTERNAL MEDICINE

## 2023-12-13 RX ORDER — EPINEPHRINE 0.3 MG/.3ML
0.3 INJECTION SUBCUTANEOUS ONCE AS NEEDED
Status: CANCELLED | OUTPATIENT
Start: 2023-12-13

## 2023-12-13 RX ORDER — SODIUM CHLORIDE 9 MG/ML
INJECTION, SOLUTION INTRAVENOUS CONTINUOUS
Status: CANCELLED | OUTPATIENT
Start: 2023-12-13

## 2023-12-13 RX ORDER — SODIUM CHLORIDE 0.9 % (FLUSH) 0.9 %
10 SYRINGE (ML) INJECTION
Status: CANCELLED | OUTPATIENT
Start: 2023-12-13

## 2023-12-13 RX ORDER — HEPARIN 100 UNIT/ML
5 SYRINGE INTRAVENOUS
Status: CANCELLED | OUTPATIENT
Start: 2023-12-13

## 2023-12-13 RX ORDER — DIPHENHYDRAMINE HYDROCHLORIDE 50 MG/ML
50 INJECTION INTRAMUSCULAR; INTRAVENOUS ONCE AS NEEDED
Status: CANCELLED | OUTPATIENT
Start: 2023-12-13

## 2023-12-13 NOTE — PROGRESS NOTES
"[facility]    CC: Anemia    HPI 78 y.o. male with history of severe iron deficiency anemia requiring blood transfusion with VCE with no active bleeding source noted. He reports to clinic today due to worsening hemoglobin noted on outpatient labs. He states that he overall has been feeling well and denies any hematochezia, melena, nausea, emesis, abdominal pain, or change in bowel habits. He has not received an iron infusion or blood transfusion in over a year. He reports daily use of plavix and eliquis.    Past Medical History  CKD IV  HTN  SANDRA  PVD  Heart Failure s/p AICD    Physical Examination  BP (!) 119/57 (BP Location: Left arm, Patient Position: Sitting, BP Method: Medium (Automatic))   Pulse (!) 59   Ht 5' 6" (1.676 m)   Wt 62.1 kg (136 lb 12.7 oz)   BMI 22.08 kg/m²   General appearance: alert, cooperative, no distress  Abdomen: soft, non-tender; bowel sounds normoactive; no organomegaly    Prior Endoscopy  EGD: Schatzki ring with normal stomach and duodenum  Colonoscopy: 10 mm colon polyp at splenic flexure; not removed due to concern for interfering with clinical picture of anemia during admission  VCE: adenomatous polyp of the ampulla; no clear source of anemia found    Labs  Hemoglobin = 8.2 I MCV = 79  Iron = 25     Assessment:   Mr. 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 - referred for evaluation by [facility] but evaluation was not completed    Plan:  - Plan for EGD with side viewing scope to assess ampullary lesion followed by push enteroscopy with pediatric enteroscope to evaluate for angiectasias and colonoscopy with pediatric colonscope for " polypectomy on 1/4/2023  - Golytely ordered and sent to pharmacy  - Patient to hold Eliquis for 3 days and Plavix for 5 days prior to procedure with bridge of daily ASA during this time  - Risk/benefit discussion with patient regarding anticoagulation/antithrombotic usage during time of procedure  - Starting parenteral iron infusions with Injectafer 750 mg x2 doses scheduled every 6-12 months as his anemia previously improved with iron supplementation.  - If this patient has blood loss from angioectasias, expect GI blood loss to recur intermittently over time as these lesions are  typically multiple and challenging to cure by endoscopic ablation. Potential future options for therapy if anemia worsens despite iron therapy include repeat enteroscopy and/or sandostatin depot injections or tranexamic acid.    Yosvany Dominique MD   [address] [place] [address], [address]  [place] [address] [phone]

## 2023-12-13 NOTE — PATIENT INSTRUCTIONS
Hold Eliquis 3 days prior to procedure.  Hold Plavix 5 days prior to procedure,   Take Aspirin 81mg daily while not taking Eliquis or plavix.    GOLYTELY/ COLYTE/ NULYTELY Prep Instructions    [facility] [address]  Clinic Office [phone]  Endoscopy Lab [phone]    You are scheduled for a Egd/Colonoscopy with Dr. Dominique on Thursday, January 4, 2024 at [facility] in [place].    Check in at the Hospital -1st floor, Information desk. Call [phone] to reschedule.    An adult friend/family member must come with you to drive you home.  You cannot drive, take a taxi, Uber/Lyft or bus to leave the Endoscopy Center alone.  If you do not have someone to drive you home, your test will be cancelled.     Please follow the directions of your doctor if you take any pills that thin your blood. If you take these meds: Aggrenox, Brilinta, Effient, Eliquis, Lovenox, Plavix, Pletal, Pradaxa, Ticilid, Xarelto or Coumadin, let the doctor's office know.    Please hold any GLP-1 medications prior to the procedure: Dulaglutide Trulicity(hold week prior), Exenatide Byetta (hold the morning of procedure), Semaglutide Ozempic (hold week prior), Liraglutide Victoza, Saxenda(hold week prior), Lixisenatide Adlyxin (hold the morning of procedure), Semaglutide Rybelsus (hold the morning of procedure), Tirzepatide Mounjaro (hold week prior)     DON'T: On the morning of the test do not take insulin or pills for diabetes.     DO: On the morning of the test, do take any pills for blood pressure, heart, anti-rejection and or seizures with a small sip of water. Bring any inhalers with you.    To have a good prep, you must follow these instructions - please do not use the directions from the pharmacy.    The doctor will send a prescription for the Golytely.      The Day Before the test:    You can only drink CLEAR LIQUIDS the whole day before your test.  You can't eat any food for the whole  day.    You CAN have:  Water, Coffee or decaf coffee (no milk or cream)  Tea  Soft drinks - regular and sugar free  Jello (green or yellow)  Apple Juice, white grape juice, white cranberry juice  Gatorade, Power Aid, Crystal Light, Reynaldo Aid  Lemonade and Limeade  Bouillon, clear soup  Snowball, popsicles  YOU CAN'T DRINK ANYTHING RED, PURPLE ORANGE OR BLUE   YOU CAN'T DRINK ALCOHOL  ONLY DRINK WHAT IS ON THE LIST      At 12 noon,   Add water up to the line on the jug of GOLYTELY (should be 1 gallon when done).  You can add a packet of yellow/green powder drink mix to the jug.   Put the bottle in the refrigerator if you prefer. It should taste better if it is cold. Do NOT put this solution over ice. It is ok to drink with a straw.    At 5 pm the NIGHT before your test:    Drink 1 glass (8 ounces) every 10 minutes until 1/2 of the jug is finished.  Put the jug back in the refrigerator after you finish the first half, and don't drink any more until 5 hours before you come to the hospital (see below for more specific details).    You can continue to drink clear liquids until you go to sleep.    The Day of the test - We will call you 2 days before your test to tell you what time to get there.    5 hours before you come to the hospital (this may be in the middle of the night)  Drink 1 glass (8 ounces) every 10 minutes until the jug is finished.     YOU CAN'T EAT OR DRINK ANYTHING ELSE ONCE YOU FINISH THE PREP    Leave all valuables and jewelry at home. You will be at the hospital for 2-4 hours.    Call the Endoscopy department at [phone] with any questions about your procedure.

## 2023-12-19 ENCOUNTER — TELEPHONE (OUTPATIENT)
Dept: INFUSION THERAPY | Facility: HOSPITAL | Age: 78
End: 2023-12-19
Payer: MEDICARE

## 2023-12-19 NOTE — TELEPHONE ENCOUNTER
(1st attempt) called the patient to schedule infusions. No answer. Voicemail not set up. Unable to leave message

## 2023-12-20 ENCOUNTER — TELEPHONE (OUTPATIENT)
Dept: INFUSION THERAPY | Facility: HOSPITAL | Age: 78
End: 2023-12-20
Payer: MEDICARE

## 2023-12-20 NOTE — TELEPHONE ENCOUNTER
Confirmed upcoming infusion appointments with the patient  1/17 at 10a  1/24 at [facility]

## 2024-01-02 ENCOUNTER — TELEPHONE (OUTPATIENT)
Dept: ENDOSCOPY | Facility: HOSPITAL | Age: 79
End: 2024-01-02
Payer: MEDICARE

## 2024-01-02 NOTE — TELEPHONE ENCOUNTER
No mailbox set-up.  Left message instructing patient to call dept @ [phone] between 8am-3pm.    Arrival time to be given @ 0800  EGD/Colon (Golytely)  Inst in AVS 12/13/23  Verify hold on Plavix and Eliquis  ([facility])

## 2024-01-03 ENCOUNTER — TELEPHONE (OUTPATIENT)
Dept: ENDOSCOPY | Facility: HOSPITAL | Age: 79
End: 2024-01-03
Payer: MEDICARE

## 2024-01-03 NOTE — TELEPHONE ENCOUNTER
Spoke with patient's spouse about arrival time @ 0800.     EGD/Colon (Rayna)    Prep instructions reviewed: the day before the procedure, follow a clear liquid diet all day, then start the first 1/2 of prep at 5pm and take 2nd 1/2 of prep @ 0300.  Pt must be completely NPO when prep completed @ 0500.              Medications: Do not take Insulin or oral diabetic medications the day of the procedure.  Take as prescribed: heart, seizure and blood pressure medication in the morning with a sip of water (less than an ounce).  Take any breathing medications and bring inhalers to hospital with you Leave all valuables and jewelry at home.     Wear comfortable clothes to procedure to change into hospital gown You cannot drive for 24 hours after your procedure because you will receive sedation for your procedure to make you comfortable.  A ride must be provided at discharge.

## 2024-01-04 ENCOUNTER — ANESTHESIA (OUTPATIENT)
Dept: ENDOSCOPY | Facility: HOSPITAL | Age: 79
End: 2024-01-04
Payer: MEDICARE

## 2024-01-04 ENCOUNTER — ANESTHESIA EVENT (OUTPATIENT)
Dept: ENDOSCOPY | Facility: HOSPITAL | Age: 79
End: 2024-01-04
Payer: MEDICARE

## 2024-01-04 ENCOUNTER — HOSPITAL ENCOUNTER (OUTPATIENT)
Facility: HOSPITAL | Age: 79
Discharge: HOME | End: 2024-01-04
Attending: INTERNAL MEDICINE | Admitting: INTERNAL MEDICINE
Payer: MEDICARE

## 2024-01-04 ENCOUNTER — TELEPHONE (OUTPATIENT)
Dept: GASTROENTEROLOGY | Facility: CLINIC | Age: 79
End: 2024-01-04
Payer: MEDICARE

## 2024-01-04 VITALS
HEIGHT: 66 IN | OXYGEN SATURATION: 96 % | DIASTOLIC BLOOD PRESSURE: 45 MMHG | WEIGHT: 120 LBS | TEMPERATURE: 98 F | HEART RATE: 62 BPM | SYSTOLIC BLOOD PRESSURE: 125 MMHG | RESPIRATION RATE: 17 BRPM | BODY MASS INDEX: 19.29 KG/M2

## 2024-01-04 DIAGNOSIS — D50.8 OTHER IRON DEFICIENCY ANEMIAS: Primary | ICD-10-CM

## 2024-01-04 DIAGNOSIS — D50.0 IRON DEFICIENCY ANEMIA DUE TO CHRONIC BLOOD LOSS: ICD-10-CM

## 2024-01-04 DIAGNOSIS — D50.0 IRON DEFICIENCY ANEMIA DUE TO CHRONIC BLOOD LOSS: Primary | ICD-10-CM

## 2024-01-04 DIAGNOSIS — K63.5 POLYP OF COLON, UNSPECIFIED PART OF COLON, UNSPECIFIED TYPE: ICD-10-CM

## 2024-01-04 DIAGNOSIS — D50.9 IDA (IRON DEFICIENCY ANEMIA): ICD-10-CM

## 2024-01-04 DIAGNOSIS — D13.5 AMPULLARY ADENOMA: ICD-10-CM

## 2024-01-04 PROCEDURE — 63600175 PHARM REV CODE 636 W HCPCS: Mod: HCNC | Performed by: NURSE ANESTHETIST, CERTIFIED REGISTERED

## 2024-01-04 PROCEDURE — 27201012 HC FORCEPS, HOT/COLD, DISP: Mod: HCNC | Performed by: INTERNAL MEDICINE

## 2024-01-04 PROCEDURE — 25000003 PHARM REV CODE 250: Mod: HCNC | Performed by: INTERNAL MEDICINE

## 2024-01-04 PROCEDURE — 88305 TISSUE EXAM BY PATHOLOGIST: CPT | Mod: 59,HCNC | Performed by: PATHOLOGY

## 2024-01-04 PROCEDURE — 88305 TISSUE EXAM BY PATHOLOGIST: CPT | Mod: 26,HCNC,, | Performed by: PATHOLOGY

## 2024-01-04 PROCEDURE — 37000008 HC ANESTHESIA 1ST 15 MINUTES: Mod: HCNC | Performed by: INTERNAL MEDICINE

## 2024-01-04 PROCEDURE — 37000009 HC ANESTHESIA EA ADD 15 MINS: Mod: HCNC | Performed by: INTERNAL MEDICINE

## 2024-01-04 PROCEDURE — 43239 EGD BIOPSY SINGLE/MULTIPLE: CPT | Mod: HCNC | Performed by: INTERNAL MEDICINE

## 2024-01-04 PROCEDURE — D9220A PRA ANESTHESIA: Mod: ANES,,, | Performed by: ANESTHESIOLOGY

## 2024-01-04 PROCEDURE — 27200997: Mod: HCNC | Performed by: INTERNAL MEDICINE

## 2024-01-04 PROCEDURE — D9220A PRA ANESTHESIA: Mod: CRNA,,, | Performed by: NURSE ANESTHETIST, CERTIFIED REGISTERED

## 2024-01-04 PROCEDURE — 27201089 HC SNARE, DISP (ANY): Mod: HCNC | Performed by: INTERNAL MEDICINE

## 2024-01-04 PROCEDURE — 25000003 PHARM REV CODE 250: Mod: HCNC | Performed by: NURSE ANESTHETIST, CERTIFIED REGISTERED

## 2024-01-04 PROCEDURE — 45385 COLONOSCOPY W/LESION REMOVAL: CPT | Mod: PT,HCNC | Performed by: INTERNAL MEDICINE

## 2024-01-04 RX ORDER — SODIUM CHLORIDE 0.9 % (FLUSH) 0.9 %
10 SYRINGE (ML) INJECTION
Status: DISCONTINUED | OUTPATIENT
Start: 2024-01-04 | End: 2024-01-04 | Stop reason: HOSPADM

## 2024-01-04 RX ORDER — PROPOFOL 10 MG/ML
VIAL (ML) INTRAVENOUS
Status: DISCONTINUED | OUTPATIENT
Start: 2024-01-04 | End: 2024-01-04

## 2024-01-04 RX ORDER — LIDOCAINE HYDROCHLORIDE 20 MG/ML
INJECTION, SOLUTION EPIDURAL; INFILTRATION; INTRACAUDAL; PERINEURAL
Status: DISCONTINUED | OUTPATIENT
Start: 2024-01-04 | End: 2024-01-04

## 2024-01-04 RX ORDER — SODIUM CHLORIDE 9 MG/ML
INJECTION, SOLUTION INTRAVENOUS CONTINUOUS
Status: DISCONTINUED | OUTPATIENT
Start: 2024-01-04 | End: 2024-01-04 | Stop reason: HOSPADM

## 2024-01-04 RX ORDER — PROPOFOL 10 MG/ML
VIAL (ML) INTRAVENOUS CONTINUOUS PRN
Status: DISCONTINUED | OUTPATIENT
Start: 2024-01-04 | End: 2024-01-04

## 2024-01-04 RX ORDER — DEXTROMETHORPHAN/PSEUDOEPHED 2.5-7.5/.8
DROPS ORAL
Status: COMPLETED | OUTPATIENT
Start: 2024-01-04 | End: 2024-01-04

## 2024-01-04 RX ADMIN — PROPOFOL 30 MG: 10 INJECTION, EMULSION INTRAVENOUS at 10:01

## 2024-01-04 RX ADMIN — SODIUM CHLORIDE: 9 INJECTION, SOLUTION INTRAVENOUS at 09:01

## 2024-01-04 RX ADMIN — SODIUM CHLORIDE: 0.9 INJECTION, SOLUTION INTRAVENOUS at 10:01

## 2024-01-04 RX ADMIN — LIDOCAINE HYDROCHLORIDE 100 MG: 20 INJECTION, SOLUTION EPIDURAL; INFILTRATION; INTRACAUDAL; PERINEURAL at 10:01

## 2024-01-04 RX ADMIN — PROPOFOL 120 MCG/KG/MIN: 10 INJECTION, EMULSION INTRAVENOUS at 10:01

## 2024-01-04 NOTE — TELEPHONE ENCOUNTER
----- Message from Lilian Weinberg sent at 1/4/2024  1:33 PM CST -----  Good afternoon,    The patient have a referral from Dr. Dominique with a diagnosis of ampullary lesion. Please assist with scheduling the patient.    Thank You

## 2024-01-04 NOTE — H&P
[facility]    CC: Anemia    HPI 78 y.o. male with history of severe iron deficiency anemia requiring blood transfusion with VCE with no active bleeding source noted. He reports to clinic today due to worsening hemoglobin noted on outpatient labs. He states that he overall has been feeling well and denies any hematochezia, melena, nausea, emesis, abdominal pain, or change in bowel habits. He has not received an iron infusion or blood transfusion in over a year. He reports daily use of plavix and eliquis.    Past Medical History  CKD IV  HTN  SANDRA  PVD  Heart Failure s/p AICD    Physical Examination  There were no vitals taken for this visit.  General appearance: alert, cooperative, no distress  Abdomen: soft, non-tender; bowel sounds normoactive; no organomegaly    Assessment:   Mr. 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

## 2024-01-04 NOTE — TELEPHONE ENCOUNTER
----- Message from Yosvany Dominique MD sent at 1/4/2024 11:47 AM CST -----  Regarding: Referral to [facility]  Appointment with Dr. Samuel JOSE please     Luke

## 2024-01-04 NOTE — ANESTHESIA POSTPROCEDURE EVALUATION
Anesthesia Post Evaluation    Patient: Rex Song    Procedure(s) Performed: Procedure(s) (LRB):  COLONOSCOPY with pediatric colonoscope (N/A)  EGD (ESOPHAGOGASTRODUODENOSCOPY) with side viewing scope (N/A)    Final Anesthesia Type: general      Patient location during evaluation: PACU  Patient participation: Yes- Able to Participate  Level of consciousness: awake and alert  Post-procedure vital signs: reviewed and stable  Pain management: adequate  Airway patency: patent    PONV status at discharge: No PONV  Anesthetic complications: no      Cardiovascular status: blood pressure returned to baseline  Respiratory status: unassisted  Hydration status: euvolemic                Vitals Value Taken Time   /54 01/04/24 1125   Temp 36.5 °C (97.7 °F) 01/04/24 1125   Pulse 60 01/04/24 1125   Resp 14 01/04/24 1125   SpO2 99 % 01/04/24 1125         No case tracking events are documented in the log.      Pain/Al Score: Al Score: 9 (1/4/2024 11:25 AM)

## 2024-01-04 NOTE — ANESTHESIA PREPROCEDURE EVALUATION
01/04/2024    Rex Song is a 78 y.o., male.        Pre-op Assessment    I have reviewed the Patient Summary Reports.     I have reviewed the Nursing Notes. I have reviewed the NPO Status.      Review of Systems  Anesthesia Hx:   History of prior surgery of interest to airway management or planning:            Denies Personal Hx of Anesthesia complications.                    Cardiovascular:    Pacemaker (ICD) Hypertension   CAD       CHF                                 Renal/:  Chronic Renal Disease                Endocrine:   Hypothyroidism              Physical Exam  General: Well nourished    Airway:  Mallampati: II   Mouth Opening: Normal  Neck ROM: Normal ROM    Dental:  Intact        Anesthesia Plan  Type of Anesthesia, risks & benefits discussed:    Anesthesia Type: Gen Natural Airway  Informed Consent: Informed consent signed with the Patient and all parties understand the risks and agree with anesthesia plan.  All questions answered.   ASA Score: 3    Ready For Surgery From Anesthesia Perspective.     .

## 2024-01-04 NOTE — PROVATION PATIENT INSTRUCTIONS
Discharge Summary/Instructions after an Endoscopic Procedure  Patient Name: Rex Song  Patient MRN: [id number]  Patient YOB: 1945 Thursday, January 4, 2024  Yosvany Dominique MD  Dear patient,  As a result of recent federal legislation (The Federal Cures Act), you may   receive lab or pathology results from your procedure in your [facility]   account before your physician is able to contact you. Your physician or   their representative will relay the results to you with their   recommendations at their soonest availability.  Thank you,  Your health is very important to us during the Covid Crisis. Following your   procedure today, you will receive a daily text for 2 weeks asking about   signs or symptoms of Covid 19.  Please respond to this text when you   receive it so we can follow up and keep you as safe as possible.   RESTRICTIONS:  During your procedure today, you received medications for sedation.  These   medications may affect your judgment, balance and coordination.  Therefore,   for 24 hours, you have the following restrictions:   - DO NOT drive a car, operate machinery, make legal/financial decisions,   sign important papers or drink alcohol.    ACTIVITY:  Today: no heavy lifting, straining or running due to procedural   sedation/anesthesia.  The following day: return to full activity including work.  DIET:  Eat and drink normally unless instructed otherwise.     TREATMENT FOR COMMON SIDE EFFECTS:  - Mild abdominal pain, nausea, belching, bloating or excessive gas:  rest,   eat lightly and use a heating pad.  - Sore Throat: treat with throat lozenges and/or gargle with warm salt   water.  - Because air was used during the procedure, expelling large amounts of air   from your rectum or belching is normal.  - If a bowel prep was taken, you may not have a bowel movement for 1-3 days.    This is normal.  SYMPTOMS TO WATCH FOR AND REPORT TO YOUR PHYSICIAN:  1. Abdominal pain or bloating, other than  gas cramps.  2. Chest pain.  3. Back pain.  4. Signs of infection such as: chills or fever occurring within 24 hours   after the procedure.  5. Rectal bleeding, which would show as bright red, maroon, or black stools.   (A tablespoon of blood from the rectum is not serious, especially if   hemorrhoids are present.)  6. Vomiting.  7. Weakness or dizziness.  GO DIRECTLY TO THE NEAREST EMERGENCY ROOM IF YOU HAVE ANY OF THE FOLLOWING:      Difficulty breathing              Chills and/or fever over 101 F   Persistent vomiting and/or vomiting blood   Severe abdominal pain   Severe chest pain   Black, tarry stools   Bleeding- more than one tablespoon   Any other symptom or condition that you feel may need urgent attention  Your doctor recommends these additional instructions:  If any biopsies were taken, your doctors clinic will contact you in 1 to 2   weeks with any results.  - Resume Eliquis (apixaban) tomorrow and Plavix (clopidogrel) tomorrow at   prior dose  - Repeat colonoscopy in 3 years for surveillance.   - Resume previous diet and medications  - Condition stable   - The signs and symptoms of potential delayed complications were discussed   with the patient. If signs or symptoms of these complications develop, call   the [facility] at 1 [phone].   - Return to normal activities tomorrow.  Written discharge instructions were   provided to the patient.  For questions, problems or results please call your physician - Yosvany Dominique MD.  EMERGENCY PHONE NUMBER: [phone],  LAB RESULTS: [phone]  IF A COMPLICATION OR EMERGENCY SITUATION ARISES AND YOU ARE UNABLE TO REACH   YOUR PHYSICIAN - GO DIRECTLY TO THE EMERGENCY ROOM.  MD Yosvany Riddle MD  1/4/2024 11:50:18 AM  This report has been verified and signed electronically.  Dear patient,  As a result of recent federal legislation (The Federal Cures Act), you may   receive lab or pathology results from your procedure in  your [facility]   account before your physician is able to contact you. Your physician or   their representative will relay the results to you with their   recommendations at their soonest availability.  Thank you,  PROVATION

## 2024-01-04 NOTE — TRANSFER OF CARE
"Anesthesia Transfer of Care Note    Patient: Rex Song    Procedure(s) Performed: Procedure(s) (LRB):  COLONOSCOPY with pediatric colonoscope (N/A)  EGD (ESOPHAGOGASTRODUODENOSCOPY) with side viewing scope (N/A)    Patient location: GI    Anesthesia Type: general    Transport from OR: Transported from OR on room air with adequate spontaneous ventilation    Post pain: adequate analgesia    Post assessment: no apparent anesthetic complications and tolerated procedure well    Post vital signs: stable    Level of consciousness: awake, alert and oriented    Nausea/Vomiting: no nausea/vomiting    Complications: none    Transfer of care protocol was followed      Last vitals: Visit Vitals  BP (!) 128/57 (BP Location: Left arm, Patient Position: Lying)   Pulse 73   Temp 36.8 °C (98.2 °F) (Skin)   Resp 18   Ht 5' 6" (1.676 m)   Wt 54.4 kg (120 lb)   SpO2 98%   BMI 19.37 kg/m²     "

## 2024-01-04 NOTE — PROVATION PATIENT INSTRUCTIONS
Discharge Summary/Instructions after an Endoscopic Procedure  Patient Name: Rex Song  Patient MRN: [id number]  Patient YOB: 1945 Thursday, January 4, 2024  Yosvany Dominique MD  Dear patient,  As a result of recent federal legislation (The Federal Cures Act), you may   receive lab or pathology results from your procedure in your [facility]   account before your physician is able to contact you. Your physician or   their representative will relay the results to you with their   recommendations at their soonest availability.  Thank you,  Your health is very important to us during the Covid Crisis. Following your   procedure today, you will receive a daily text for 2 weeks asking about   signs or symptoms of Covid 19.  Please respond to this text when you   receive it so we can follow up and keep you as safe as possible.   RESTRICTIONS:  During your procedure today, you received medications for sedation.  These   medications may affect your judgment, balance and coordination.  Therefore,   for 24 hours, you have the following restrictions:   - DO NOT drive a car, operate machinery, make legal/financial decisions,   sign important papers or drink alcohol.    ACTIVITY:  Today: no heavy lifting, straining or running due to procedural   sedation/anesthesia.  The following day: return to full activity including work.  DIET:  Eat and drink normally unless instructed otherwise.     TREATMENT FOR COMMON SIDE EFFECTS:  - Mild abdominal pain, nausea, belching, bloating or excessive gas:  rest,   eat lightly and use a heating pad.  - Sore Throat: treat with throat lozenges and/or gargle with warm salt   water.  - Because air was used during the procedure, expelling large amounts of air   from your rectum or belching is normal.  - If a bowel prep was taken, you may not have a bowel movement for 1-3 days.    This is normal.  SYMPTOMS TO WATCH FOR AND REPORT TO YOUR PHYSICIAN:  1. Abdominal pain or bloating, other than  gas cramps.  2. Chest pain.  3. Back pain.  4. Signs of infection such as: chills or fever occurring within 24 hours   after the procedure.  5. Rectal bleeding, which would show as bright red, maroon, or black stools.   (A tablespoon of blood from the rectum is not serious, especially if   hemorrhoids are present.)  6. Vomiting.  7. Weakness or dizziness.  GO DIRECTLY TO THE NEAREST EMERGENCY ROOM IF YOU HAVE ANY OF THE FOLLOWING:      Difficulty breathing              Chills and/or fever over 101 F   Persistent vomiting and/or vomiting blood   Severe abdominal pain   Severe chest pain   Black, tarry stools   Bleeding- more than one tablespoon   Any other symptom or condition that you feel may need urgent attention  Your doctor recommends these additional instructions:  If any biopsies were taken, your doctors clinic will contact you in 1 to 2   weeks with any results.  - Clinic visit with [facility] team to discuss management of the observed ampullary   lesion. I will personally coordinate this visit within 1 week  For questions, problems or results please call your physician - Yosvany Dominique MD.  EMERGENCY PHONE NUMBER: [phone],  LAB RESULTS: [phone]  IF A COMPLICATION OR EMERGENCY SITUATION ARISES AND YOU ARE UNABLE TO REACH   YOUR PHYSICIAN - GO DIRECTLY TO THE EMERGENCY ROOM.  MD Yosvany Riddle MD  1/4/2024 12:04:51 PM  This report has been verified and signed electronically.  Dear patient,  As a result of recent federal legislation (The Federal Cures Act), you may   receive lab or pathology results from your procedure in your [facility]   account before your physician is able to contact you. Your physician or   their representative will relay the results to you with their   recommendations at their soonest availability.  Thank you,  PROVATION

## 2024-01-05 LAB
FINAL PATHOLOGIC DIAGNOSIS: NORMAL
GROSS: NORMAL
Lab: NORMAL

## 2024-01-06 ENCOUNTER — CLINICAL SUPPORT (OUTPATIENT)
Dept: CARDIOLOGY | Facility: HOSPITAL | Age: 79
End: 2024-01-06
Attending: INTERNAL MEDICINE
Payer: MEDICARE

## 2024-01-06 ENCOUNTER — CLINICAL SUPPORT (OUTPATIENT)
Dept: CARDIOLOGY | Facility: HOSPITAL | Age: 79
End: 2024-01-06
Payer: MEDICARE

## 2024-01-06 DIAGNOSIS — I25.5 ISCHEMIC CARDIOMYOPATHY: ICD-10-CM

## 2024-01-06 DIAGNOSIS — Z95.810 PRESENCE OF AUTOMATIC (IMPLANTABLE) CARDIAC DEFIBRILLATOR: ICD-10-CM

## 2024-01-06 PROCEDURE — 93296 REM INTERROG EVL PM/IDS: CPT | Mod: HCNC | Performed by: INTERNAL MEDICINE

## 2024-01-06 PROCEDURE — 93295 DEV INTERROG REMOTE 1/2/MLT: CPT | Mod: HCNC,,, | Performed by: INTERNAL MEDICINE

## 2024-01-09 ENCOUNTER — TELEPHONE (OUTPATIENT)
Dept: GASTROENTEROLOGY | Facility: CLINIC | Age: 79
End: 2024-01-09
Payer: MEDICARE

## 2024-01-09 NOTE — TELEPHONE ENCOUNTER
----- Message from Yosvany Dominique MD sent at 1/8/2024  4:01 PM CST -----  Please coordinate with [facility] to make sure this patient is seen in the next 1-2 weeks. Let me know if I need to message Dr. Baker to help expedite

## 2024-01-09 NOTE — TELEPHONE ENCOUNTER
Mr. 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 Referring: Yosvany Dominique MD.  Thanks,  Juan C Baker MD

## 2024-01-09 NOTE — TELEPHONE ENCOUNTER
Jessica, wife, notified per today's notes, Dr. Baker's staff to contact to schedule an appt with [facility]. Acknowledged understanding.

## 2024-01-10 ENCOUNTER — TELEPHONE (OUTPATIENT)
Dept: ENDOSCOPY | Facility: HOSPITAL | Age: 79
End: 2024-01-10
Payer: MEDICARE

## 2024-01-10 ENCOUNTER — OFFICE VISIT (OUTPATIENT)
Dept: GASTROENTEROLOGY | Facility: CLINIC | Age: 79
End: 2024-01-10
Payer: MEDICARE

## 2024-01-10 VITALS
HEIGHT: 63 IN | BODY MASS INDEX: 21.26 KG/M2 | TEMPERATURE: 74 F | SYSTOLIC BLOOD PRESSURE: 120 MMHG | DIASTOLIC BLOOD PRESSURE: 96 MMHG

## 2024-01-10 DIAGNOSIS — D50.0 IRON DEFICIENCY ANEMIA DUE TO CHRONIC BLOOD LOSS: ICD-10-CM

## 2024-01-10 DIAGNOSIS — D13.5 AMPULLARY ADENOMA: ICD-10-CM

## 2024-01-10 DIAGNOSIS — D13.5 AMPULLARY ADENOMA: Primary | ICD-10-CM

## 2024-01-10 DIAGNOSIS — K92.2 GASTROINTESTINAL HEMORRHAGE, UNSPECIFIED GASTROINTESTINAL HEMORRHAGE TYPE: Primary | ICD-10-CM

## 2024-01-10 PROCEDURE — 3080F DIAST BP >= 90 MM HG: CPT | Mod: HCNC,CPTII,S$GLB, | Performed by: INTERNAL MEDICINE

## 2024-01-10 PROCEDURE — 1157F ADVNC CARE PLAN IN RCRD: CPT | Mod: HCNC,CPTII,S$GLB, | Performed by: INTERNAL MEDICINE

## 2024-01-10 PROCEDURE — 99999 PR PBB SHADOW E&M-EST. PATIENT-LVL II: CPT | Mod: PBBFAC,HCNC,, | Performed by: INTERNAL MEDICINE

## 2024-01-10 PROCEDURE — 3074F SYST BP LT 130 MM HG: CPT | Mod: HCNC,CPTII,S$GLB, | Performed by: INTERNAL MEDICINE

## 2024-01-10 PROCEDURE — 1101F PT FALLS ASSESS-DOCD LE1/YR: CPT | Mod: HCNC,CPTII,S$GLB, | Performed by: INTERNAL MEDICINE

## 2024-01-10 PROCEDURE — 3288F FALL RISK ASSESSMENT DOCD: CPT | Mod: HCNC,CPTII,S$GLB, | Performed by: INTERNAL MEDICINE

## 2024-01-10 PROCEDURE — 1160F RVW MEDS BY RX/DR IN RCRD: CPT | Mod: HCNC,CPTII,S$GLB, | Performed by: INTERNAL MEDICINE

## 2024-01-10 PROCEDURE — 1159F MED LIST DOCD IN RCRD: CPT | Mod: HCNC,CPTII,S$GLB, | Performed by: INTERNAL MEDICINE

## 2024-01-10 PROCEDURE — 99214 OFFICE O/P EST MOD 30 MIN: CPT | Mod: HCNC,S$GLB,, | Performed by: INTERNAL MEDICINE

## 2024-01-10 NOTE — Clinical Note
Please schedule ERCP for ampullectomy with me only at [facility], 1.25 hours

## 2024-01-10 NOTE — PROGRESS NOTES
"GENERAL GI PATIENT INTAKE:    COVID symptoms in the last 7 days (runny nose, sore throat, congestion, cough, fever): No  PCP: [facility]  If not PCP-  number given to establish [phone]: N/A    ALLERGIES REVIEWED:  Yes    CHIEF COMPLAINT:  No chief complaint on file.      VITAL SIGNS:  BP (!) 120/96   Temp (!) 74 °F (23.3 °C)   Ht 5' 3" (1.6 m)   BMI 21.26 kg/m²      Change in medical, surgical, family or social history: Yes      REVIEWED MEDICATION LIST RECONCILED INCLUDING ABOVE MEDS:  Yes     "

## 2024-01-10 NOTE — PROGRESS NOTES
SUBJECTIVE:         Chief Complaint: No chief complaint on file.      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  He is an 78-year-old male who underwent an upper endoscopy for GI bleeding last week.  A mass was found on his major papilla.  The biopsies of which demonstrated an adenoma.  He is here to day to discuss endoscopic management      OBJECTIVE:     Vital Signs (Most Recent)  Temp: (!) 74 °F (23.3 °C) (01/10/24 1053)  BP: (!) 120/96 (01/10/24 1053)    General: appears older than stated age and chronically ill     Diagnostic Results:  Upper GI: Reviewed      ASSESSMENT/PLAN:     We discussed the risks and benefits of an ampullary ampullectomy including the risks of significant bleeding and pancreatitis described the procedure in detail also describing the subsequent follow-up endoscopy needed to remove stent.  I quoted him a risk of approximately 20% risk of pancreatitis and given his significant medical comorbidities could result in his death.  I told him the risk of cancer is relatively low in the next 1-2 years.  In his wife had an opportunity to discuss ask questions and decided they would like to proceed with ampullary ampullectomy.  We will schedule that in due time.

## 2024-01-10 NOTE — TELEPHONE ENCOUNTER
Spoke to Pts wife to schedule procedure(s) ERCP        Physician to perform procedure(s) Dr. LOI Hall  Date of Procedure (s) 1/31/24  Arrival Time 11:30 AM  Time of Procedure(s) 12:30 PM   Location of Procedure(s) [address]  Type of Rx Prep sent to patient: N/A  Instructions provided to patient via [facility]    Patient was informed on the following information and verbalized understanding. Screening questionnaire reviewed with patient and complete. If procedure requires anesthesia, a responsible adult needs to be present to accompany the patient home, patient cannot drive after receiving anesthesia. Appointment details are tentative, especially check-in time. Patient will receive a prep-op call 7 days prior to confirm check-in time for procedure. If applicable the patient should contact their pharmacy to verify Rx for procedure prep is ready for pick-up. Patient was advised to call the scheduling department at [phone] if pharmacy states no Rx is available. Patient was advised to call the endoscopy scheduling department if any questions or concerns arise.       Endoscopy Scheduling Department        ERCP Prep Instructions    Date of procedure: 1/31/24 Arrive at: 11:30 AM    Location of Department:   [facility] - [address] [place] [address]  Take the [facility] Elevators to 2nd Floor Outpatient Surgery    How to prep:      Day Before Procedure 1/30/24     You may have a light evening meal.   No solid food after 7:00 pm.   Continue drinking clear liquids.      Day of the Procedure 1/31/24     You may have water/clear liquids until 4 hours before your procedure or as directed by the scheduling nurse 8:30 AM.   See below for list.    What You CANNOT do:   Do not drink milk or anything colored red.  Do not drink alcohol.  No gum chewing or candy morning of procedure    Liquids That Are OK to Drink:   Water  Sports drinks (Gatorade, Power-Aid)  Coffee or tea (no cream or nondairy  creamer)  Clear juices without pulp (apple, white grape)  Gelatin desserts (no fruit or toppings)  Clear soda (sprite, coke, ginger ale)  Chicken broth (until 12 midnight the night before procedure)            IMPORTANT INFORMATION TO KNOW BEFORE YOUR PROCEDURE    [facility] [place] 2nd Floor    If your procedure requires the administration of anesthesia, it is necessary for a responsible adult to drive you home. (Medical Transportation, Uber, Lyft, Taxi, etc. may ONLY be used if a responsible adult is present to accompany you home.  The responsible adult CAN'T be the  of the service).      person must be available to return to pick you up within 15 minutes of being notified of discharge.     Due to the limited socially distant seating in our waiting room, please limit your guest (1) who accompany you for this procedure. If someone accompanies you for this procedure into the facility:    Consider having them proceed to an area that is socially distant other than the lobby until a member of the medical team contacts them to provide an update after the procedure.     Also, please consider being dropped off and picked up from the facility.      Please bring a picture ID, insurance card, & copayment    Take Medications as directed below:      1) Stop taking Plavix (clopidogrel) (prior to the procedure) on:  1/26/24      2) Stop taking Eliquis (apixaban) (prior to the procedure) on:  1/29/24    If you begin taking any blood thinning medications, please contact the  listed below as soon as possible.    If you are diabetic see the attached instruction sheet regarding your medication.     If you take HEART, BLOOD PRESSURE, SEIZURE, PAIN, LUNG (including inhalers/nebulizers), ANTI-REJECTION (transplant patients), or PSYCHIATRIC medications, please take at your regular times with a sip of water or as directed by the scheduling nurse.     Important contact information:    Endoscopy  [phone] Hours of operation Monday-Friday 8:00-4:30pm.    Questions about insurance or financial obligations call [phone] or [phone].    If you have questions regarding the prep or need to reschedule, please call [phone]. After hours questions requiring immediate assistance, contact [facility] On-Call nurse line at [phone] or [phone].   NOTE:     On occasion, unforeseen circumstances may cause a delay in your procedure start time. We respect your time and appreciate your patience during these circumstances.

## 2024-01-11 PROBLEM — K63.5 POLYP OF COLON: Status: ACTIVE | Noted: 2024-01-11

## 2024-01-17 ENCOUNTER — INFUSION (OUTPATIENT)
Dept: INFUSION THERAPY | Facility: HOSPITAL | Age: 79
End: 2024-01-17
Attending: INTERNAL MEDICINE
Payer: MEDICARE

## 2024-01-17 VITALS
OXYGEN SATURATION: 98 % | WEIGHT: 120 LBS | HEART RATE: 66 BPM | SYSTOLIC BLOOD PRESSURE: 100 MMHG | DIASTOLIC BLOOD PRESSURE: 48 MMHG | BODY MASS INDEX: 21.26 KG/M2 | HEIGHT: 63 IN | RESPIRATION RATE: 18 BRPM | TEMPERATURE: 99 F

## 2024-01-17 DIAGNOSIS — D62 ACUTE BLOOD LOSS ANEMIA: Primary | ICD-10-CM

## 2024-01-17 PROCEDURE — 63600175 PHARM REV CODE 636 W HCPCS: Mod: JZ,JG,HCNC | Performed by: INTERNAL MEDICINE

## 2024-01-17 PROCEDURE — 96365 THER/PROPH/DIAG IV INF INIT: CPT | Mod: HCNC

## 2024-01-17 PROCEDURE — A4216 STERILE WATER/SALINE, 10 ML: HCPCS | Mod: HCNC | Performed by: INTERNAL MEDICINE

## 2024-01-17 PROCEDURE — 25000003 PHARM REV CODE 250: Mod: HCNC | Performed by: INTERNAL MEDICINE

## 2024-01-17 RX ORDER — SODIUM CHLORIDE 0.9 % (FLUSH) 0.9 %
10 SYRINGE (ML) INJECTION
Status: DISCONTINUED | OUTPATIENT
Start: 2024-01-17 | End: 2024-01-17 | Stop reason: HOSPADM

## 2024-01-17 RX ORDER — SODIUM CHLORIDE 0.9 % (FLUSH) 0.9 %
10 SYRINGE (ML) INJECTION
Status: CANCELLED | OUTPATIENT
Start: 2024-01-24

## 2024-01-17 RX ORDER — SODIUM CHLORIDE 9 MG/ML
INJECTION, SOLUTION INTRAVENOUS CONTINUOUS
Status: CANCELLED | OUTPATIENT
Start: 2024-01-24

## 2024-01-17 RX ORDER — HEPARIN 100 UNIT/ML
5 SYRINGE INTRAVENOUS
Status: CANCELLED | OUTPATIENT
Start: 2024-01-24

## 2024-01-17 RX ORDER — EPINEPHRINE 0.3 MG/.3ML
0.3 INJECTION SUBCUTANEOUS ONCE AS NEEDED
Status: CANCELLED | OUTPATIENT
Start: 2024-01-24

## 2024-01-17 RX ORDER — DIPHENHYDRAMINE HYDROCHLORIDE 50 MG/ML
50 INJECTION INTRAMUSCULAR; INTRAVENOUS ONCE AS NEEDED
Status: CANCELLED | OUTPATIENT
Start: 2024-01-24

## 2024-01-17 RX ORDER — SODIUM CHLORIDE 9 MG/ML
INJECTION, SOLUTION INTRAVENOUS CONTINUOUS
Status: DISCONTINUED | OUTPATIENT
Start: 2024-01-17 | End: 2024-01-17 | Stop reason: HOSPADM

## 2024-01-17 RX ADMIN — SODIUM CHLORIDE: 9 INJECTION, SOLUTION INTRAVENOUS at 10:01

## 2024-01-17 RX ADMIN — FERRIC CARBOXYMALTOSE INJECTION 750 MG: 50 INJECTION, SOLUTION INTRAVENOUS at 10:01

## 2024-01-17 RX ADMIN — Medication 10 ML: at 11:01

## 2024-01-17 NOTE — NURSING
Pt tolerated injectafer 1/2 infusion well.  No adverse reaction noted.   IV flushed with NS and D/C per protocol.  Patient left clinic in no acute distress.

## 2024-01-17 NOTE — PLAN OF CARE
Problem: Anemia  Goal: Anemia Symptom Improvement  Outcome: Ongoing, Progressing  Intervention: Monitor and Manage Anemia  Flowsheets (Taken 1/17/2024 1105)  Fatigue Management: (injectafer) other (see comments)

## 2024-01-24 ENCOUNTER — INFUSION (OUTPATIENT)
Dept: INFUSION THERAPY | Facility: HOSPITAL | Age: 79
End: 2024-01-24
Attending: INTERNAL MEDICINE
Payer: MEDICARE

## 2024-01-24 VITALS
OXYGEN SATURATION: 94 % | RESPIRATION RATE: 18 BRPM | SYSTOLIC BLOOD PRESSURE: 98 MMHG | HEART RATE: 78 BPM | TEMPERATURE: 99 F | DIASTOLIC BLOOD PRESSURE: 52 MMHG

## 2024-01-24 DIAGNOSIS — D62 ACUTE BLOOD LOSS ANEMIA: Primary | ICD-10-CM

## 2024-01-24 PROCEDURE — 63600175 PHARM REV CODE 636 W HCPCS: Mod: JZ,JG,HCNC | Performed by: INTERNAL MEDICINE

## 2024-01-24 PROCEDURE — 25000003 PHARM REV CODE 250: Mod: HCNC | Performed by: INTERNAL MEDICINE

## 2024-01-24 PROCEDURE — 96365 THER/PROPH/DIAG IV INF INIT: CPT | Mod: HCNC

## 2024-01-24 RX ORDER — HEPARIN 100 UNIT/ML
5 SYRINGE INTRAVENOUS
OUTPATIENT
Start: 2024-01-31

## 2024-01-24 RX ORDER — SODIUM CHLORIDE 9 MG/ML
INJECTION, SOLUTION INTRAVENOUS CONTINUOUS
Status: CANCELLED | OUTPATIENT
Start: 2024-01-31

## 2024-01-24 RX ORDER — EPINEPHRINE 0.3 MG/.3ML
0.3 INJECTION SUBCUTANEOUS ONCE AS NEEDED
OUTPATIENT
Start: 2024-01-31

## 2024-01-24 RX ORDER — SODIUM CHLORIDE 0.9 % (FLUSH) 0.9 %
10 SYRINGE (ML) INJECTION
Status: CANCELLED | OUTPATIENT
Start: 2024-01-31

## 2024-01-24 RX ORDER — DIPHENHYDRAMINE HYDROCHLORIDE 50 MG/ML
50 INJECTION INTRAMUSCULAR; INTRAVENOUS ONCE AS NEEDED
OUTPATIENT
Start: 2024-01-31

## 2024-01-24 RX ORDER — SODIUM CHLORIDE 9 MG/ML
INJECTION, SOLUTION INTRAVENOUS CONTINUOUS
Status: DISCONTINUED | OUTPATIENT
Start: 2024-01-24 | End: 2024-01-24 | Stop reason: HOSPADM

## 2024-01-24 RX ORDER — SODIUM CHLORIDE 0.9 % (FLUSH) 0.9 %
10 SYRINGE (ML) INJECTION
Status: DISCONTINUED | OUTPATIENT
Start: 2024-01-24 | End: 2024-01-24 | Stop reason: HOSPADM

## 2024-01-24 RX ADMIN — FERRIC CARBOXYMALTOSE INJECTION 750 MG: 50 INJECTION, SOLUTION INTRAVENOUS at 10:01

## 2024-01-25 ENCOUNTER — TELEPHONE (OUTPATIENT)
Dept: ELECTROPHYSIOLOGY | Facility: CLINIC | Age: 79
End: 2024-01-25
Payer: MEDICARE

## 2024-01-25 DIAGNOSIS — I25.10 CORONARY ARTERY DISEASE INVOLVING NATIVE CORONARY ARTERY WITHOUT ANGINA PECTORIS, UNSPECIFIED WHETHER NATIVE OR TRANSPLANTED HEART: ICD-10-CM

## 2024-01-25 DIAGNOSIS — I10 ESSENTIAL HYPERTENSION: ICD-10-CM

## 2024-01-25 RX ORDER — AMIODARONE HYDROCHLORIDE 200 MG/1
400 TABLET ORAL 2 TIMES DAILY
Qty: 56 TABLET | Refills: 0 | Status: CANCELLED | OUTPATIENT
Start: 2024-01-25

## 2024-01-25 RX ORDER — METOPROLOL SUCCINATE 50 MG/1
50 TABLET, EXTENDED RELEASE ORAL 2 TIMES DAILY
Qty: 180 TABLET | Refills: 3 | Status: CANCELLED | OUTPATIENT
Start: 2024-01-25

## 2024-01-25 RX ORDER — AMIODARONE HYDROCHLORIDE 200 MG/1
200 TABLET ORAL DAILY
Qty: 90 TABLET | Refills: 3 | Status: CANCELLED | OUTPATIENT
Start: 2024-02-09 | End: 2025-02-09

## 2024-01-25 NOTE — TELEPHONE ENCOUNTER
"S/w patient's wife by phone regarding atrial fibrillation.  The patient is currently ill with a cold (not tested for COVID or flu); he is heard coughing in the background  She indicated his cold symptoms started Mon/Tues this week  Per wife, he has been treating with OTC Coricidan HBP and Nyquil w/ honey  She reports he has been taking all of his prescribed medications, including Toprol 50mg daily    He has not had any symptoms related to the AF or ATP    She routinely takes his BP at home, cannot take it right now "because the batteries are out"; wife notes his SBP is always > 100 bpm    Will review information with MD and f/u with patient   "

## 2024-01-25 NOTE — TELEPHONE ENCOUNTER
"Following Provider: Dr. Raza    Device Type:  ICD   Date/Time:  5 ATPs with aborted shock on 1/24/24   from [id number]              Event Type:  AF w/ RVR    Ventricular CL:  203-210 ms    Duration:  range from 14-32 secs  Therapy Delivered:   ATP x 5, aborted shock,   Appropriate Therapy:  No  Successful:  No   Pt Symptomatic:  LVM    No Hx of VT ablation  Notable medications/anti-arrhythmics  Eliquis   Toprol XL 50 mg po daily    last EF and date 25-30% on 3/22/2023    Last clinic visit: 10/25/2023-Rate control plan, per 6/2022 note "We discussed trial again of DCCV with increased amiodarone dosing versus PVI. They were not interested. We stopped amiodarone and increased metoprolol."    Recall 4/2024      It is noted that pt has been in AF since 1/22/24  Toya 9.3%, undersensing noted(in refractory when atrial rates slows)     Unable to leave vm on cell.  Left voicemail on home line to assess symptoms, med compliance  and BP status.    Will forward to in clinic staff to discuss with Dr. Raza                      Pt recently diagnosed with Ampullary Adenoma.   Hx of Chronic Stage 4 Kidney disease   Anemia   Received transfusion and getting Iron Infusions.   Planned ERCP on 1/31/24    Sample of events: Attached first and last episode  Last episodes slows RVR but it increases again                        ________        ________    Parameters       "

## 2024-01-25 NOTE — TELEPHONE ENCOUNTER
Called and left vm on home phone.    Left vm that Toprol will be increased to 50 mg po BID  Pt needs to start Amiodarone loading dose 400mg po BID for 2 weeks then take Amiodarone 200mg po qd.      I asked pt/wife to call clinic back. Amiodarone load dose should be sent to a local pharmacy.  Need to find out which pharmacy they want and also to see if they have enough Toprol to take twice a day, as trying to determine if that rx can be Mail order.

## 2024-01-25 NOTE — TELEPHONE ENCOUNTER
Called in rx to [facility].  Spoke to Pharmacist  Amiodarone 200mg tablets take 2 pills ( 400mg) po BID for two weeks.  Disp 56 pills, no refills per Dr. Raza.      Spoke with pts wife and they understand new med orders   Pt has enough Toprol XL to take BID until his new rx arrives.  Toprol XL 50 mg po BID and Additional Amiodarone  200mg po daily (once pt finishes loading dose) for Mail order will be sent by ROMEL Rizvi RN.

## 2024-01-25 NOTE — TELEPHONE ENCOUNTER
Orders received to increase Toprol XL 50 mg qd to 50 mg BID     Start Amiodarone 400mg po BID for 2 weeks then Amiodarone 200mg po daily      Spoke with pt's wife Jessica. Explained to her new med instructions.    She just received a new rx for Toprol. She understands to take one BID and a new rx will be send to Mail order with daily Dose of Amiodarone.      Will send loading dose of Amiodarone to local pharmacy.    They will await further instructions for KERRY/DCCV

## 2024-01-26 ENCOUNTER — TELEPHONE (OUTPATIENT)
Dept: ELECTROPHYSIOLOGY | Facility: CLINIC | Age: 79
End: 2024-01-26
Payer: MEDICARE

## 2024-01-26 RX ORDER — AMIODARONE HYDROCHLORIDE 200 MG/1
200 TABLET ORAL DAILY
Qty: 30 TABLET | Refills: 11 | Status: SHIPPED | OUTPATIENT
Start: 2024-01-29 | End: 2024-02-19

## 2024-01-26 RX ORDER — METOPROLOL SUCCINATE 50 MG/1
50 TABLET, EXTENDED RELEASE ORAL 2 TIMES DAILY
Qty: 180 TABLET | Refills: 3 | Status: SHIPPED | OUTPATIENT
Start: 2024-01-26 | End: 2024-04-08 | Stop reason: DRUGHIGH

## 2024-01-26 NOTE — TELEPHONE ENCOUNTER
Attempted to contact patient to scheduled KERRY/DCCV but no answer received on both the patient or spouse's phone. Voicemail not available on patient's phone. Voicemail requesting return call to discuss procedure scheduling left on spouse's phone.

## 2024-01-29 ENCOUNTER — TELEPHONE (OUTPATIENT)
Dept: ENDOSCOPY | Facility: HOSPITAL | Age: 79
End: 2024-01-29
Payer: MEDICARE

## 2024-01-29 NOTE — TELEPHONE ENCOUNTER
Nemesio Raza MD Dent, [place] RN  Caller: Unspecified (Today,  9:51 AM)  Hi Georgia    He can hold his Eliquis 2 days prior. As far as his plavix, I do not manage this medication for him. Please reach out to his cardiologist regarding this.          Previous Messages       ----- Message -----  From: Tosin Silverman RN  Sent: 1/29/2024   9:52 AM CST  To: Nemesio Raza MD; Ry FREDERICK Staff    Dear Provider,    Patient has a scheduled procedure ERCP  on 1/31/24 and is currently taking a blood thinner prescribed by your office. In order to ensure patient safety, we would like to confirm that the patient can place their blood thinner medication on hold for the procedure. Can he/she discontinue Plavix (clopidogrel) for a minimum of 5 days prior to the procedure?     Thank you for your prompt reply.    [facility] Endoscopy Scheduling

## 2024-01-31 ENCOUNTER — ANESTHESIA (OUTPATIENT)
Dept: ENDOSCOPY | Facility: HOSPITAL | Age: 79
End: 2024-01-31
Payer: MEDICARE

## 2024-01-31 ENCOUNTER — HOSPITAL ENCOUNTER (OUTPATIENT)
Facility: HOSPITAL | Age: 79
Discharge: HOME | End: 2024-01-31
Attending: INTERNAL MEDICINE | Admitting: INTERNAL MEDICINE
Payer: MEDICARE

## 2024-01-31 ENCOUNTER — ANESTHESIA EVENT (OUTPATIENT)
Dept: ENDOSCOPY | Facility: HOSPITAL | Age: 79
End: 2024-01-31
Payer: MEDICARE

## 2024-01-31 VITALS
BODY MASS INDEX: 21.26 KG/M2 | RESPIRATION RATE: 18 BRPM | OXYGEN SATURATION: 97 % | HEART RATE: 71 BPM | WEIGHT: 120 LBS | TEMPERATURE: 99 F | HEIGHT: 63 IN | DIASTOLIC BLOOD PRESSURE: 52 MMHG | SYSTOLIC BLOOD PRESSURE: 97 MMHG

## 2024-01-31 DIAGNOSIS — D13.5 AMPULLARY ADENOMA: ICD-10-CM

## 2024-01-31 LAB
OHS CV AF BURDEN PERCENT: 2
OHS CV DC REMOTE DEVICE TYPE: NORMAL
OHS CV ICD SHOCK: NO
OHS CV RV PACING PERCENT: 1 %

## 2024-01-31 PROCEDURE — 37000009 HC ANESTHESIA EA ADD 15 MINS: Mod: HCNC | Performed by: INTERNAL MEDICINE

## 2024-01-31 PROCEDURE — 43274 ERCP DUCT STENT PLACEMENT: CPT | Mod: HCNC | Performed by: INTERNAL MEDICINE

## 2024-01-31 PROCEDURE — 27201042 HC RETRIEVAL NET: Mod: HCNC | Performed by: INTERNAL MEDICINE

## 2024-01-31 PROCEDURE — 27201089 HC SNARE, DISP (ANY): Mod: HCNC | Performed by: INTERNAL MEDICINE

## 2024-01-31 PROCEDURE — 25000003 PHARM REV CODE 250: Mod: HCNC | Performed by: NURSE ANESTHETIST, CERTIFIED REGISTERED

## 2024-01-31 PROCEDURE — 74329 X-RAY FOR PANCREAS ENDOSCOPY: CPT | Mod: 26,HCNC,, | Performed by: INTERNAL MEDICINE

## 2024-01-31 PROCEDURE — C2617 STENT, NON-COR, TEM W/O DEL: HCPCS | Mod: HCNC | Performed by: INTERNAL MEDICINE

## 2024-01-31 PROCEDURE — 43251 EGD REMOVE LESION SNARE: CPT | Mod: HCNC | Performed by: INTERNAL MEDICINE

## 2024-01-31 PROCEDURE — 63600175 PHARM REV CODE 636 W HCPCS: Mod: HCNC | Performed by: NURSE ANESTHETIST, CERTIFIED REGISTERED

## 2024-01-31 PROCEDURE — 74329 X-RAY FOR PANCREAS ENDOSCOPY: CPT | Mod: TC,HCNC | Performed by: INTERNAL MEDICINE

## 2024-01-31 PROCEDURE — D9220A PRA ANESTHESIA: Mod: HCNC,ANES,, | Performed by: ANESTHESIOLOGY

## 2024-01-31 PROCEDURE — D9220A PRA ANESTHESIA: Mod: HCNC,CRNA,, | Performed by: NURSE ANESTHETIST, CERTIFIED REGISTERED

## 2024-01-31 PROCEDURE — 88309 TISSUE EXAM BY PATHOLOGIST: CPT | Mod: HCNC | Performed by: PATHOLOGY

## 2024-01-31 PROCEDURE — 25000003 PHARM REV CODE 250: Mod: HCNC | Performed by: INTERNAL MEDICINE

## 2024-01-31 PROCEDURE — 25500020 PHARM REV CODE 255: Mod: HCNC | Performed by: INTERNAL MEDICINE

## 2024-01-31 PROCEDURE — 27201674 HC SPHINCTERTOME: Mod: HCNC | Performed by: INTERNAL MEDICINE

## 2024-01-31 PROCEDURE — 88305 TISSUE EXAM BY PATHOLOGIST: CPT | Mod: 26,HCNC,, | Performed by: PATHOLOGY

## 2024-01-31 PROCEDURE — 37000008 HC ANESTHESIA 1ST 15 MINUTES: Mod: HCNC | Performed by: INTERNAL MEDICINE

## 2024-01-31 PROCEDURE — 43274 ERCP DUCT STENT PLACEMENT: CPT | Mod: HCNC,,, | Performed by: INTERNAL MEDICINE

## 2024-01-31 PROCEDURE — 43251 EGD REMOVE LESION SNARE: CPT | Mod: 51,HCNC,, | Performed by: INTERNAL MEDICINE

## 2024-01-31 PROCEDURE — C1769 GUIDE WIRE: HCPCS | Mod: HCNC | Performed by: INTERNAL MEDICINE

## 2024-01-31 DEVICE — ZIMMON PANCREATIC STENT
Type: IMPLANTABLE DEVICE | Site: OTHER (ADD COMMENT) | Status: FUNCTIONAL
Brand: ZIMMON

## 2024-01-31 RX ORDER — DEXMEDETOMIDINE HYDROCHLORIDE 100 UG/ML
INJECTION, SOLUTION INTRAVENOUS
Status: DISCONTINUED | OUTPATIENT
Start: 2024-01-31 | End: 2024-01-31

## 2024-01-31 RX ORDER — PHENYLEPHRINE HYDROCHLORIDE 10 MG/ML
INJECTION INTRAVENOUS
Status: DISCONTINUED | OUTPATIENT
Start: 2024-01-31 | End: 2024-01-31

## 2024-01-31 RX ORDER — INDOMETHACIN 50 MG/1
SUPPOSITORY RECTAL
Status: COMPLETED | OUTPATIENT
Start: 2024-01-31 | End: 2024-01-31

## 2024-01-31 RX ORDER — SODIUM CHLORIDE 0.9 % (FLUSH) 0.9 %
10 SYRINGE (ML) INJECTION
Status: DISCONTINUED | OUTPATIENT
Start: 2024-01-31 | End: 2024-01-31 | Stop reason: HOSPADM

## 2024-01-31 RX ORDER — SODIUM CHLORIDE 9 MG/ML
INJECTION, SOLUTION INTRAVENOUS CONTINUOUS
Status: DISCONTINUED | OUTPATIENT
Start: 2024-01-31 | End: 2024-01-31 | Stop reason: HOSPADM

## 2024-01-31 RX ORDER — LIDOCAINE HYDROCHLORIDE 20 MG/ML
INJECTION INTRAVENOUS
Status: DISCONTINUED | OUTPATIENT
Start: 2024-01-31 | End: 2024-01-31

## 2024-01-31 RX ORDER — PROPOFOL 10 MG/ML
VIAL (ML) INTRAVENOUS CONTINUOUS PRN
Status: DISCONTINUED | OUTPATIENT
Start: 2024-01-31 | End: 2024-01-31

## 2024-01-31 RX ADMIN — PHENYLEPHRINE HYDROCHLORIDE 100 MCG: 10 INJECTION INTRAVENOUS at 01:01

## 2024-01-31 RX ADMIN — PROPOFOL 175 MCG/KG/MIN: 10 INJECTION, EMULSION INTRAVENOUS at 01:01

## 2024-01-31 RX ADMIN — DEXMEDETOMIDINE 8 MCG: 100 INJECTION, SOLUTION, CONCENTRATE INTRAVENOUS at 01:01

## 2024-01-31 RX ADMIN — SODIUM CHLORIDE: 9 INJECTION, SOLUTION INTRAVENOUS at 01:01

## 2024-01-31 RX ADMIN — PROPOFOL 40 MG: 10 INJECTION, EMULSION INTRAVENOUS at 01:01

## 2024-01-31 RX ADMIN — LIDOCAINE HYDROCHLORIDE 50 MG: 20 INJECTION INTRAVENOUS at 01:01

## 2024-01-31 RX ADMIN — GLYCOPYRROLATE 0.2 MG: 0.2 INJECTION, SOLUTION INTRAMUSCULAR; INTRAVENOUS at 01:01

## 2024-01-31 NOTE — ANESTHESIA PREPROCEDURE EVALUATION
[facility]  Anesthesia Pre-Operative Evaluation         Patient Name/: Rex Song, 1945  MRN: [id number]    SUBJECTIVE:     Pre-operative evaluation for Procedure(s) (LRB):  ERCP (ENDOSCOPIC RETROGRADE CHOLANGIOPANCREATOGRAPHY) (N/A)     2024    Rex Song is a 79 y.o. male   Pre-operative evaluation for ERCP (ENDOSCOPIC RETROGRADE CHOLANGIOPANCREATOGRAPHY) (N/A)    Chief Complaint: adenoma of major papilla    PMH  coronary artery disease (RCA , PCI to LCX/PDA) with a LVEF of 30-35%, hypertension, multiple sclerosis, stage 4 chronic kidney disease, blood loss anemia, and atrial fibrillation/flutter despite amiodarone therapy.    Demand pac emaker-dual chamber [place]- RA pace3d 33% RV paced 1%  Past Surgical History:   Procedure Laterality Date    COLONOSCOPY N/A 2022    Procedure: COLONOSCOPY;  Surgeon: Earl Hall MD;  Location: [facility];  Service: Endoscopy;  Laterality: N/A;    COLONOSCOPY N/A 2024    Procedure: COLONOSCOPY with pediatric colonoscope;  Surgeon: Yosvany Dominique MD;  Location: [facility] ENDO;  Service: Endoscopy;  Laterality: N/A;    CORONARY ANGIOPLASTY WITH STENT PLACEMENT  2000    LCX stent/ PDA stent    ESOPHAGOGASTRODUODENOSCOPY N/A 2022    Procedure: EGD (ESOPHAGOGASTRODUODENOSCOPY);  Surgeon: Yosvany Dominique MD;  Location: [facility] ENDO;  Service: Endoscopy;  Laterality: N/A;    ESOPHAGOGASTRODUODENOSCOPY N/A 2024    Procedure: EGD (ESOPHAGOGASTRODUODENOSCOPY) with side viewing scope;  Surgeon: Yosvany Dominique MD;  Location: [facility] ENDO;  Service: Endoscopy;  Laterality: N/A;    INSERTION, ICD, DUAL CHAMBER Left 2023    Procedure: Insertion, ICD, Dual Chamber;  Surgeon: Nemesio Raza MD;  Location: [facility] EP LAB;  Service: Cardiology;  Laterality: Left;  ICM, Dual ICD, SJM, MAC, OH, 3 Prep    INTRALUMINAL GASTROINTESTINAL TRACT IMAGING VIA CAPSULE N/A 2022    Procedure: IMAGING PROCEDURE, GI TRACT, INTRALUMINAL,  "VIA CAPSULE;  Surgeon: Yosvany Dominique MD;  Location: [facility];  Service: Endoscopy;  Laterality: N/A;         Vital Signs Range (Last 24H):  Temp:  [36.8 °C (98.2 °F)]   Pulse:  [69]   Resp:  [17]   BP: (130)/(59)   SpO2:  [94 %]       CBC:   No results for input(s): "WBC", "RBC", "HGB", "HCT", "PLT", "MCV", "MCH", "MCHC" in the last 720 hours.    CMP: No results for input(s): "NA", "K", "CL", "CO2", "BUN", "CREATININE", "GLU", "MG", "PHOS", "CALCIUM", "ALBUMIN", "PROT", "ALKPHOS", "ALT", "AST", "BILITOT" in the last 720 hours.    INR:  No results for input(s): "PT", "INR", "PROTIME", "APTT" in the last 720 hours.      Diagnostic Studies:      EKD Echo:   Patient now presents for the above procedure(s).    ________________________________________  Results for orders placed during the hospital encounter of 23    Echo    Interpretation Summary  · The left ventricle is mildly enlarged measuring 5.3 cm with severely decreased systolic function.  · There is moderate left ventricular global hypokinesis. The estimated ejection fraction is 25-30%. Additionally the basal inferior and inferolateral walls are akinetic.  · Normal right ventricular size with normal right ventricular systolic function.  · Grade I left ventricular diastolic dysfunction.  · Normal central venous pressure (3 mmHg).    ________________________________________    LDA:        Drips:       Patient Active Problem List   Diagnosis    CAD (coronary artery disease)    Post PTCA    Essential hypertension    Mixed hyperlipidemia    AAA (abdominal aortic aneurysm)    PVD (peripheral vascular disease)    Multiple sclerosis    Dysphagia    Colon cancer screening    Benign neoplasm of colon    Screening for colon cancer    CKD (chronic kidney disease) stage 4, GFR 15-29 ml/min    Tobacco abuse    Complex renal cyst    Atrial fibrillation    HFrEF (heart failure with reduced ejection fraction)    Acute renal injury    Hypothyroidism    " Thrombocytopenia    GI bleed    Acute blood loss anemia    Anemia    Weakness    Other iron deficiency anemias    ICD (implantable cardioverter-defibrillator) in place    On anticoagulant therapy    Ampullary adenoma    Polyp of colon       Review of patient's allergies indicates:   Allergen Reactions    Cortisone      Other reaction(s): Itching       Current Inpatient Medications:       No current facility-administered medications on file prior to encounter.     Current Outpatient Medications on File Prior to Encounter   Medication Sig Dispense Refill    amLODIPine (NORVASC) 5 MG tablet TAKE 1 TABLET EVERY DAY 90 tablet 3    apixaban (ELIQUIS) 2.5 mg Tab Take 1 tablet (2.5 mg total) by mouth 2 (two) times daily. 180 tablet 3    cholecalciferol, vitamin D3, (VITAMIN D3) 25 mcg (1,000 unit) capsule Take 1 capsule (1,000 Units total) by mouth once daily. 30 capsule 1    clopidogreL (PLAVIX) 75 mg tablet Take 1 tablet (75 mg total) by mouth once daily. 90 tablet 3    irbesartan (AVAPRO) 150 MG tablet Take 1 tablet (150 mg total) by mouth every evening. 90 tablet 3    levothyroxine (SYNTHROID) 25 MCG tablet Take 1 tablet (25 mcg total) by mouth before breakfast. 30 tablet 11    nitroGLYCERIN (NITROSTAT) 0.4 MG SL tablet Place 1 tablet (0.4 mg total) under the tongue every 5 (five) minutes as needed. 25 tablet 5    omeprazole (PRILOSEC) 40 MG capsule TAKE 1 CAPSULE EVERY MORNING 90 capsule 3    rosuvastatin (CRESTOR) 40 MG Tab TAKE 1 TABLET BY MOUTH EVERY DAY 90 tablet 3       Past Surgical History:   Procedure Laterality Date    COLONOSCOPY N/A 4/12/2022    Procedure: COLONOSCOPY;  Surgeon: Earl Hall MD;  Location: [facility] ENDO;  Service: Endoscopy;  Laterality: N/A;    COLONOSCOPY N/A 1/4/2024    Procedure: COLONOSCOPY with pediatric colonoscope;  Surgeon: Yosvany Dominique MD;  Location: [facility] ENDO;  Service: Endoscopy;  Laterality: N/A;    CORONARY ANGIOPLASTY WITH STENT PLACEMENT  08/18/2000    LCX stent/ PDA  stent    ESOPHAGOGASTRODUODENOSCOPY N/A 4/11/2022    Procedure: EGD (ESOPHAGOGASTRODUODENOSCOPY);  Surgeon: Yosvany Dominique MD;  Location: [facility] ENDO;  Service: Endoscopy;  Laterality: N/A;    ESOPHAGOGASTRODUODENOSCOPY N/A 1/4/2024    Procedure: EGD (ESOPHAGOGASTRODUODENOSCOPY) with side viewing scope;  Surgeon: Yosvany Dominique MD;  Location: [facility] ENDO;  Service: Endoscopy;  Laterality: N/A;    INSERTION, ICD, DUAL CHAMBER Left 6/7/2023    Procedure: Insertion, ICD, Dual Chamber;  Surgeon: Nemesio Raza MD;  Location: [facility] EP LAB;  Service: Cardiology;  Laterality: Left;  ICM, Dual ICD, SJM, MAC, AR, 3 Prep    INTRALUMINAL GASTROINTESTINAL TRACT IMAGING VIA CAPSULE N/A 5/4/2022    Procedure: IMAGING PROCEDURE, GI TRACT, INTRALUMINAL, VIA CAPSULE;  Surgeon: Yosvany Dominique MD;  Location: [facility] ENDO;  Service: Endoscopy;  Laterality: N/A;       Social History:  Tobacco Use: Medium Risk (1/17/2024)    Patient History     Smoking Tobacco Use: Former     Smokeless Tobacco Use: Never     Passive Exposure: Not on file       Alcohol Use: Not on file       OBJECTIVE:     Vital Signs Range:  BMI Readings from Last 1 Encounters:   01/17/24 21.26 kg/m²               Significant Labs:        Component Value Date/Time    WBC 7.20 12/13/2023 0937    HGB 8.1 (L) 12/13/2023 0937    HCT 28.9 (L) 12/13/2023 0937     (L) 12/13/2023 0937     12/11/2023 0913    K 4.5 12/11/2023 0913     12/11/2023 0913    CO2 24 12/11/2023 0913    GLU 99 12/11/2023 0913    BUN 22 (H) 12/11/2023 0913    CREATININE 1.85 (H) 12/11/2023 0913    MG 2.3 12/05/2023 1350    PHOS 3.4 12/05/2023 1350    CALCIUM 9.4 12/11/2023 0913    ALBUMIN 3.9 12/05/2023 1350    PROT 7.2 10/24/2022 1318    ALKPHOS 120 10/24/2022 1318    BILITOT 0.5 10/24/2022 1318    AST 21 10/24/2022 1318    ALT 12 10/24/2022 1318    INR 1.0 12/11/2023 0913    HGBA1C 5.2 04/11/2022 0356        Please see Results Review for additional labs.     Diagnostic Studies: No  relevant studies.    EKG:   Results for orders placed or performed during the hospital encounter of 06/07/23   EKG 12-lead    Collection Time: 06/07/23  1:48 PM    Narrative    Test Reason : I49.9,    Vent. Rate : 071 BPM     Atrial Rate : 071 BPM     P-R Int : 180 ms          QRS Dur : 090 ms      QT Int : 396 ms       P-R-T Axes : 054 001 -56 degrees     QTc Int : 430 ms    Normal sinus rhythm  Anterolateral infarct ,age undetermined  Abnormal ECG  When compared with ECG of 07-JUN-2023 09:38,  Anterior infarct is now Present  Anterolateral infarct is now Present  unless lead placement has markedly  changed  T wave amplitude has decreased in Anterior leads  Confirmed by Aguilar GROVE MD ([id number]) on 6/7/2023 2:56:32 PM    Referred By: CHINA PINEDA           Confirmed By:Aguilar GROVE MD       ECHO:  See subjective, if available.      ASSESSMENT/PLAN:                                                                                                                  01/31/2024  Rex Song is a 79 y.o., male.      Pre-op Assessment    I have reviewed the Patient Summary Reports.     I have reviewed the Nursing Notes. I have reviewed the NPO Status.   I have reviewed the Medications.     Review of Systems  Anesthesia Hx:  No problems with previous Anesthesia                Social:  Non-Smoker, No Alcohol Use       Cardiovascular:     Hypertension   CAD    Dysrhythmias atrial fibrillation  CHF      NYHA Classification IV ECG has been reviewed.                          Pulmonary:  Pulmonary Normal                       Renal/:  Chronic Renal Disease, CKD                Musculoskeletal:     ? MS       Spine Disorders: in chart but no neurology visits.             Neurological:  Neurology Normal                                      Endocrine:   Hypothyroidism              Physical Exam  General: Well nourished    Airway:  Mallampati: II   Mouth Opening: Normal  Neck ROM: Normal ROM    Dental:  Intact        Anesthesia  Plan  Type of Anesthesia, risks & benefits discussed:    Anesthesia Type: Gen Natural Airway, Gen ETT  Intra-op Monitoring Plan: Standard ASA Monitors  Induction:  IV  Informed Consent: Informed consent signed with the Patient and all parties understand the risks and agree with anesthesia plan.  All questions answered.   ASA Score: 4  Day of Surgery Review of History & Physical: H&P Update referred to the surgeon/provider.    Ready For Surgery From Anesthesia Perspective.     .

## 2024-01-31 NOTE — H&P
Short Stay Endoscopy History and Physical    PCP - [facility]  Referring Physician - Earl Hall MD  [address]  [address]  [place] [address]    Procedure - ercp  ASA - per anesthesia  Mallampati - per anesthesia  History of Anesthesia problems - no  Family history Anesthesia problems -  no   Plan of anesthesia - General    HPI:  This is a 79 y.o. male here for evaluation of: ampulletomy    Reflux - no  Dysphagia - no  Abdominal pain - no  Diarrhea - no    ROS:  Constitutional: No fevers, chills, No weight loss  CV: No chest pain  Pulm: No cough, No shortness of breath  Ophtho: No vision changes  GI: see HPI  Derm: No rash    Medical History:  has a past medical history of Bilateral renal cysts, CHF (congestive heart failure), CKD (chronic kidney disease) stage 4, GFR 15-29 ml/min, Hematuria, unspecified, Hypertension, Iron deficiency anemia, Personal history of colonic polyps (01/01/2005), Proteinuria, PVD (peripheral vascular disease), and Vitamin D deficiency.    Surgical History:  has a past surgical history that includes Coronary angioplasty with stent (08/18/2000); Esophagogastroduodenoscopy (N/A, 4/11/2022); Colonoscopy (N/A, 4/12/2022); Intraluminal gastrointestinal tract imaging via capsule (N/A, 5/4/2022); insertion, icd, dual chamber (Left, 6/7/2023); Colonoscopy (N/A, 1/4/2024); and Esophagogastroduodenoscopy (N/A, 1/4/2024).    Family History: family history includes Prostate cancer (age of onset: 59) in his brother..    Social History:  reports that he has quit smoking. His smoking use included cigarettes. He has never used smokeless tobacco. He reports that he does not currently use alcohol. He reports that he does not use drugs.    Review of patient's allergies indicates:   Allergen Reactions    Cortisone      Other reaction(s): Itching       Medications:   Medications Prior to Admission   Medication Sig Dispense Refill Last Dose    amiodarone (PACERONE)  200 MG Tab Take 1 tablet (200 mg total) by mouth once daily. 30 tablet 11 1/30/2024    amLODIPine (NORVASC) 5 MG tablet TAKE 1 TABLET EVERY DAY 90 tablet 3 1/30/2024    cholecalciferol, vitamin D3, (VITAMIN D3) 25 mcg (1,000 unit) capsule Take 1 capsule (1,000 Units total) by mouth once daily. 30 capsule 1 1/30/2024    irbesartan (AVAPRO) 150 MG tablet Take 1 tablet (150 mg total) by mouth every evening. 90 tablet 3 1/30/2024    levothyroxine (SYNTHROID) 25 MCG tablet Take 1 tablet (25 mcg total) by mouth before breakfast. 30 tablet 11 1/30/2024    metoprolol succinate (TOPROL-XL) 50 MG 24 hr tablet Take 1 tablet (50 mg total) by mouth 2 (two) times daily. 180 tablet 3 1/30/2024    omeprazole (PRILOSEC) 40 MG capsule TAKE 1 CAPSULE EVERY MORNING 90 capsule 3 1/30/2024    rosuvastatin (CRESTOR) 40 MG Tab TAKE 1 TABLET BY MOUTH EVERY DAY 90 tablet 3 1/30/2024    apixaban (ELIQUIS) 2.5 mg Tab Take 1 tablet (2.5 mg total) by mouth 2 (two) times daily. 180 tablet 3 1/27/2024    clopidogreL (PLAVIX) 75 mg tablet Take 1 tablet (75 mg total) by mouth once daily. 90 tablet 3 1/25/2024    nitroGLYCERIN (NITROSTAT) 0.4 MG SL tablet Place 1 tablet (0.4 mg total) under the tongue every 5 (five) minutes as needed. 25 tablet 5 Unknown       Physical Exam:    Vital Signs:   Vitals:    01/31/24 1133   BP: (!) 130/59   Pulse: 69   Resp: 17   Temp: 98.2 °F (36.8 °C)       General Appearance: Well appearing in no acute distress    Labs:  Lab Results   Component Value Date    WBC 7.20 12/13/2023    HGB 8.1 (L) 12/13/2023    HCT 28.9 (L) 12/13/2023     (L) 12/13/2023    CHOL 98 (L) 04/11/2022    TRIG 127 04/11/2022    HDL 29 (L) 04/11/2022    ALT 12 10/24/2022    AST 21 10/24/2022     12/11/2023    K 4.5 12/11/2023     12/11/2023    CREATININE 1.85 (H) 12/11/2023    BUN 22 (H) 12/11/2023    CO2 24 12/11/2023    TSH 23.839 (H) 04/11/2022    INR 1.0 12/11/2023    HGBA1C 5.2 04/11/2022       I have explained the risks and  benefits of this endoscopic procedure to the patient including but not limited to bleeding, inflammation, infection, perforation, and death.      Earl Hall MD

## 2024-01-31 NOTE — ANESTHESIA POSTPROCEDURE EVALUATION
Anesthesia Post Evaluation    Patient: Rex Song    Procedure(s) Performed: Procedure(s) (LRB):  ERCP (ENDOSCOPIC RETROGRADE CHOLANGIOPANCREATOGRAPHY) (N/A)    Final Anesthesia Type: general      Patient location during evaluation: PACU  Patient participation: Yes- Able to Participate  Level of consciousness: awake and alert and oriented  Post-procedure vital signs: reviewed and stable  Pain management: adequate  Airway patency: patent    PONV status at discharge: No PONV  Anesthetic complications: no      Cardiovascular status: hemodynamically stable  Respiratory status: unassisted, spontaneous ventilation and room air  Hydration status: euvolemic  Follow-up not needed.              Vitals Value Taken Time   BP 97/52 01/31/24 1343   Temp 37.2 °C (98.9 °F) 01/31/24 1413   Pulse 71 01/31/24 1413   Resp 18 01/31/24 1343   SpO2 97 % 01/31/24 1413         No case tracking events are documented in the log.      Pain/Al Score: Al Score: 10 (1/31/2024  1:58 PM)

## 2024-01-31 NOTE — PROVATION PATIENT INSTRUCTIONS
Discharge Summary/Instructions after an Endoscopic Procedure  Patient Name: Rex Song  Patient MRN: [id number]  Patient YOB: 1945 Wednesday, January 31, 2024  Earl Hall MD  Dear patient,  As a result of recent federal legislation (The Federal Cures Act), you may   receive lab or pathology results from your procedure in your [facility]   account before your physician is able to contact you. Your physician or   their representative will relay the results to you with their   recommendations at their soonest availability.  Thank you,  RESTRICTIONS:  During your procedure today, you received medications for sedation.  These   medications may affect your judgment, balance and coordination.  Therefore,   for 24 hours, you have the following restrictions:   - DO NOT drive a car, operate machinery, make legal/financial decisions,   sign important papers or drink alcohol.    ACTIVITY:  Today: no heavy lifting, straining or running due to procedural   sedation/anesthesia.  The following day: return to full activity including work.  DIET:  Eat and drink normally unless instructed otherwise.     TREATMENT FOR COMMON SIDE EFFECTS:  - Mild abdominal pain, nausea, belching, bloating or excessive gas:  rest,   eat lightly and use a heating pad.  - Sore Throat: treat with throat lozenges and/or gargle with warm salt   water.  - Because air was used during the procedure, expelling large amounts of air   from your rectum or belching is normal.  - If a bowel prep was taken, you may not have a bowel movement for 1-3 days.    This is normal.  SYMPTOMS TO WATCH FOR AND REPORT TO YOUR PHYSICIAN:  1. Abdominal pain or bloating, other than gas cramps.  2. Chest pain.  3. Back pain.  4. Signs of infection such as: chills or fever occurring within 24 hours   after the procedure.  5. Rectal bleeding, which would show as bright red, maroon, or black stools.   (A tablespoon of blood from the rectum is not serious, especially  if   hemorrhoids are present.)  6. Vomiting.  7. Weakness or dizziness.  GO DIRECTLY TO THE NEAREST EMERGENCY ROOM IF YOU HAVE ANY OF THE FOLLOWING:      Difficulty breathing              Chills and/or fever over 101 F   Persistent vomiting and/or vomiting blood   Severe abdominal pain   Severe chest pain   Black, tarry stools   Bleeding- more than one tablespoon   Any other symptom or condition that you feel may need urgent attention  Your doctor recommends these additional instructions:  If any biopsies were taken, your doctors clinic will contact you in 1 to 2   weeks with any results.  - Discharge patient to home.   - Resume previous diet.   - Continue present medications.   - Await path results.   - Perform a flat plate and upright abdominal x-ray in 6 weeks.   - Perform an upper GI endoscopy if stent remains in place.  - Hold Plavix and Eliquis for 3 more days  For questions, problems or results please call your physician - Earl Hall MD at Work:  [phone].  [facility] [place], EMERGENCY ROOM PHONE NUMBER: [phone]  IF A COMPLICATION OR EMERGENCY SITUATION ARISES AND YOU ARE UNABLE TO REACH   YOUR PHYSICIAN - GO DIRECTLY TO THE EMERGENCY ROOM.  Earl Hall MD  1/31/2024 1:46:47 PM  This report has been verified and signed electronically.  Dear patient,  As a result of recent federal legislation (The Federal Cures Act), you may   receive lab or pathology results from your procedure in your [facility]   account before your physician is able to contact you. Your physician or   their representative will relay the results to you with their   recommendations at their soonest availability.  Thank you,  PROVATION

## 2024-01-31 NOTE — TRANSFER OF CARE
"Anesthesia Transfer of Care Note    Patient: Rex Song    Procedure(s) Performed: Procedure(s) (LRB):  ERCP (ENDOSCOPIC RETROGRADE CHOLANGIOPANCREATOGRAPHY) (N/A)    Patient location: PACU    Anesthesia Type: MAC    Transport from OR: Transported from OR on room air with adequate spontaneous ventilation    Post pain: adequate analgesia    Post assessment: no apparent anesthetic complications and tolerated procedure well    Post vital signs: stable    Level of consciousness: awake, alert and oriented    Nausea/Vomiting: no nausea/vomiting    Complications: none    Transfer of care protocol was followed      Last vitals: Visit Vitals  BP (!) 130/59 (BP Location: Left arm, Patient Position: Lying)   Pulse 69   Temp 36.8 °C (98.2 °F) (Temporal)   Resp 17   Ht 5' 3" (1.6 m)   Wt 54.4 kg (120 lb)   SpO2 (!) 94%   BMI 21.26 kg/m²     "

## 2024-02-01 ENCOUNTER — TELEPHONE (OUTPATIENT)
Dept: ENDOSCOPY | Facility: HOSPITAL | Age: 79
End: 2024-02-01
Payer: MEDICARE

## 2024-02-01 NOTE — TELEPHONE ENCOUNTER
Telephone patient to schedule abdominal xray with no answer. Unable LVM no portal access. Reminder set to f/u

## 2024-02-02 ENCOUNTER — TELEPHONE (OUTPATIENT)
Dept: ELECTROPHYSIOLOGY | Facility: CLINIC | Age: 79
End: 2024-02-02
Payer: MEDICARE

## 2024-02-02 NOTE — TELEPHONE ENCOUNTER
Attempted to reach mr Ruiz regarding scheduling KERRY/DCCV for next week but unable to leave msg on cell phone because vmail hasn't been set up and did leave a message on his home phone. Urvashi Carrion RN

## 2024-02-05 ENCOUNTER — TELEPHONE (OUTPATIENT)
Dept: ELECTROPHYSIOLOGY | Facility: CLINIC | Age: 79
End: 2024-02-05
Payer: MEDICARE

## 2024-02-05 DIAGNOSIS — Z79.01 ON ANTICOAGULANT THERAPY: ICD-10-CM

## 2024-02-05 DIAGNOSIS — N18.4 CKD (CHRONIC KIDNEY DISEASE) STAGE 4, GFR 15-29 ML/MIN: Primary | ICD-10-CM

## 2024-02-05 DIAGNOSIS — N18.4 CKD (CHRONIC KIDNEY DISEASE) STAGE 4, GFR 15-29 ML/MIN: Chronic | ICD-10-CM

## 2024-02-05 DIAGNOSIS — I50.20 HFREF (HEART FAILURE WITH REDUCED EJECTION FRACTION): Chronic | ICD-10-CM

## 2024-02-05 DIAGNOSIS — I48.91 ATRIAL FIBRILLATION, UNSPECIFIED TYPE: Primary | Chronic | ICD-10-CM

## 2024-02-05 DIAGNOSIS — I10 ESSENTIAL HYPERTENSION: Chronic | ICD-10-CM

## 2024-02-05 DIAGNOSIS — I51.7 CARDIOMEGALY: ICD-10-CM

## 2024-02-05 DIAGNOSIS — Z95.810 ICD (IMPLANTABLE CARDIOVERTER-DEFIBRILLATOR) IN PLACE: ICD-10-CM

## 2024-02-05 NOTE — TELEPHONE ENCOUNTER
Spoke with Mrs. Ruiz and date has been set on 2/7 for KERRY/DCCV per Dr Raza request. Patient is currently asymptomatic and instructions will be emailed. Urvashi Carrion RN

## 2024-02-05 NOTE — TELEPHONE ENCOUNTER
----- Message from Antoine Calvillo MA sent at 2/5/2024  2:29 PM CST -----  Regarding: FW: Returning Call    ----- Message -----  From: Leno Jefferson  Sent: 2/5/2024   2:16 PM CST  To: Ry FREDERICK Staff  Subject: Returning Call                                   Pt returning call.    Contact @ [phone]    Thanks

## 2024-02-05 NOTE — TELEPHONE ENCOUNTER
Another attempt today to reach Mr Sara strong hasn't been set up on cell phone so couldn't leave a message and also couldn't reach the home number. Message had been left on the home number on Friday. Urvashi Carrion RN

## 2024-02-06 ENCOUNTER — TELEPHONE (OUTPATIENT)
Dept: ELECTROPHYSIOLOGY | Facility: CLINIC | Age: 79
End: 2024-02-06
Payer: MEDICARE

## 2024-02-06 LAB
FINAL PATHOLOGIC DIAGNOSIS: NORMAL
GROSS: NORMAL
Lab: NORMAL

## 2024-02-06 NOTE — TELEPHONE ENCOUNTER
Spoke to patient's wife  - she will be taking her  for labs around 2 PM today-missed earlier appt that had been set    CONFIRMED procedure arrival time of 09:00 AM for 11:00 AM on 2/7/24 for KERRY/DCCV    Reiterated instructions including:  -Directions to check in desk  -NPO after midnight night prior to procedure  -High importance of HOLDING Irbesartan the morning of the procedure. Last dose should be taken on 2/6/24  -Confirmed compliance of maintaining Eliquis as prescribed.  -Pre-procedure LABS will be reviewed as soon as available  -Confirmed presence of implanted device/stimulator  - SJM ICD  -Confirmed no fever, cough, or shortness of breath in the past 30 days  -Confirmed no redness, rash, irritation, or yeast infection to groin area.   -Reviewed current visitor policy    Patient verbalized understanding of above and appreciated the call.

## 2024-02-07 ENCOUNTER — HOSPITAL ENCOUNTER (OUTPATIENT)
Dept: CARDIOLOGY | Facility: HOSPITAL | Age: 79
Discharge: HOME | End: 2024-02-07
Attending: INTERNAL MEDICINE
Payer: MEDICARE

## 2024-02-07 ENCOUNTER — HOSPITAL ENCOUNTER (OUTPATIENT)
Facility: HOSPITAL | Age: 79
Discharge: HOME | End: 2024-02-07
Attending: INTERNAL MEDICINE | Admitting: INTERNAL MEDICINE
Payer: MEDICARE

## 2024-02-07 ENCOUNTER — ANESTHESIA (OUTPATIENT)
Dept: MEDSURG UNIT | Facility: HOSPITAL | Age: 79
End: 2024-02-07
Payer: MEDICARE

## 2024-02-07 ENCOUNTER — ANESTHESIA EVENT (OUTPATIENT)
Dept: MEDSURG UNIT | Facility: HOSPITAL | Age: 79
End: 2024-02-07
Payer: MEDICARE

## 2024-02-07 VITALS
SYSTOLIC BLOOD PRESSURE: 119 MMHG | HEIGHT: 63 IN | BODY MASS INDEX: 22.15 KG/M2 | DIASTOLIC BLOOD PRESSURE: 59 MMHG | WEIGHT: 125 LBS

## 2024-02-07 VITALS
WEIGHT: 125 LBS | HEIGHT: 63 IN | OXYGEN SATURATION: 99 % | SYSTOLIC BLOOD PRESSURE: 111 MMHG | HEART RATE: 82 BPM | RESPIRATION RATE: 20 BRPM | TEMPERATURE: 98 F | DIASTOLIC BLOOD PRESSURE: 58 MMHG | BODY MASS INDEX: 22.15 KG/M2

## 2024-02-07 DIAGNOSIS — I10 ESSENTIAL HYPERTENSION: Chronic | ICD-10-CM

## 2024-02-07 DIAGNOSIS — N18.4 CKD (CHRONIC KIDNEY DISEASE) STAGE 4, GFR 15-29 ML/MIN: Chronic | ICD-10-CM

## 2024-02-07 DIAGNOSIS — I51.7 CARDIOMEGALY: ICD-10-CM

## 2024-02-07 DIAGNOSIS — I48.91 ATRIAL FIBRILLATION WITH RVR: ICD-10-CM

## 2024-02-07 DIAGNOSIS — I48.91 ATRIAL FIBRILLATION: ICD-10-CM

## 2024-02-07 DIAGNOSIS — I48.91 ATRIAL FIBRILLATION, UNSPECIFIED TYPE: Chronic | ICD-10-CM

## 2024-02-07 DIAGNOSIS — Z79.01 ON ANTICOAGULANT THERAPY: ICD-10-CM

## 2024-02-07 DIAGNOSIS — Z95.810 ICD (IMPLANTABLE CARDIOVERTER-DEFIBRILLATOR) IN PLACE: ICD-10-CM

## 2024-02-07 DIAGNOSIS — I50.20 HFREF (HEART FAILURE WITH REDUCED EJECTION FRACTION): Chronic | ICD-10-CM

## 2024-02-07 LAB
OHS QRS DURATION: 110 MS
OHS QRS DURATION: 120 MS
OHS QTC CALCULATION: 476 MS
OHS QTC CALCULATION: 491 MS

## 2024-02-07 PROCEDURE — 25000242 PHARM REV CODE 250 ALT 637 W/ HCPCS: Mod: HCNC | Performed by: INTERNAL MEDICINE

## 2024-02-07 PROCEDURE — 94640 AIRWAY INHALATION TREATMENT: CPT | Mod: HCNC

## 2024-02-07 PROCEDURE — 93325 DOPPLER ECHO COLOR FLOW MAPG: CPT | Mod: HCNC

## 2024-02-07 PROCEDURE — 93320 DOPPLER ECHO COMPLETE: CPT | Mod: 26,HCNC,, | Performed by: INTERNAL MEDICINE

## 2024-02-07 PROCEDURE — 93325 DOPPLER ECHO COLOR FLOW MAPG: CPT | Mod: 26,HCNC,, | Performed by: INTERNAL MEDICINE

## 2024-02-07 PROCEDURE — 25000003 PHARM REV CODE 250: Mod: HCNC | Performed by: STUDENT IN AN ORGANIZED HEALTH CARE EDUCATION/TRAINING PROGRAM

## 2024-02-07 PROCEDURE — 63600175 PHARM REV CODE 636 W HCPCS: Mod: JG,HCNC | Performed by: STUDENT IN AN ORGANIZED HEALTH CARE EDUCATION/TRAINING PROGRAM

## 2024-02-07 PROCEDURE — 93005 ELECTROCARDIOGRAM TRACING: CPT | Mod: HCNC,59

## 2024-02-07 PROCEDURE — 92960 CARDIOVERSION ELECTRIC EXT: CPT | Mod: HCNC,,, | Performed by: INTERNAL MEDICINE

## 2024-02-07 PROCEDURE — D9220A PRA ANESTHESIA: Mod: HCNC,CRNA,, | Performed by: STUDENT IN AN ORGANIZED HEALTH CARE EDUCATION/TRAINING PROGRAM

## 2024-02-07 PROCEDURE — 93010 ELECTROCARDIOGRAM REPORT: CPT | Mod: HCNC,,, | Performed by: INTERNAL MEDICINE

## 2024-02-07 PROCEDURE — D9220A PRA ANESTHESIA: Mod: HCNC,ANES,, | Performed by: ANESTHESIOLOGY

## 2024-02-07 PROCEDURE — 37000008 HC ANESTHESIA 1ST 15 MINUTES: Mod: HCNC | Performed by: INTERNAL MEDICINE

## 2024-02-07 PROCEDURE — 37000009 HC ANESTHESIA EA ADD 15 MINS: Mod: HCNC | Performed by: INTERNAL MEDICINE

## 2024-02-07 PROCEDURE — 94761 N-INVAS EAR/PLS OXIMETRY MLT: CPT | Mod: HCNC

## 2024-02-07 PROCEDURE — 93312 ECHO TRANSESOPHAGEAL: CPT | Mod: 26,HCNC,, | Performed by: INTERNAL MEDICINE

## 2024-02-07 PROCEDURE — 92960 CARDIOVERSION ELECTRIC EXT: CPT | Mod: HCNC | Performed by: INTERNAL MEDICINE

## 2024-02-07 PROCEDURE — 93010 ELECTROCARDIOGRAM REPORT: CPT | Mod: HCNC,76,, | Performed by: INTERNAL MEDICINE

## 2024-02-07 RX ORDER — LIDOCAINE HYDROCHLORIDE 20 MG/ML
INJECTION INTRAVENOUS
Status: DISCONTINUED | OUTPATIENT
Start: 2024-02-07 | End: 2024-02-07

## 2024-02-07 RX ORDER — ONDANSETRON HYDROCHLORIDE 2 MG/ML
4 INJECTION, SOLUTION INTRAVENOUS DAILY PRN
Status: DISCONTINUED | OUTPATIENT
Start: 2024-02-07 | End: 2024-02-07 | Stop reason: HOSPADM

## 2024-02-07 RX ORDER — ETOMIDATE 2 MG/ML
INJECTION INTRAVENOUS
Status: DISCONTINUED | OUTPATIENT
Start: 2024-02-07 | End: 2024-02-07

## 2024-02-07 RX ORDER — FENTANYL CITRATE 50 UG/ML
25 INJECTION, SOLUTION INTRAMUSCULAR; INTRAVENOUS EVERY 5 MIN PRN
Status: DISCONTINUED | OUTPATIENT
Start: 2024-02-07 | End: 2024-02-07 | Stop reason: HOSPADM

## 2024-02-07 RX ORDER — LIDOCAINE HYDROCHLORIDE 20 MG/ML
SOLUTION OROPHARYNGEAL
Status: DISCONTINUED | OUTPATIENT
Start: 2024-02-07 | End: 2024-02-07

## 2024-02-07 RX ORDER — IPRATROPIUM BROMIDE AND ALBUTEROL SULFATE 2.5; .5 MG/3ML; MG/3ML
3 SOLUTION RESPIRATORY (INHALATION) ONCE
Status: COMPLETED | OUTPATIENT
Start: 2024-02-07 | End: 2024-02-07

## 2024-02-07 RX ORDER — VASOPRESSIN 20 [USP'U]/ML
INJECTION, SOLUTION INTRAMUSCULAR; SUBCUTANEOUS
Status: DISCONTINUED | OUTPATIENT
Start: 2024-02-07 | End: 2024-02-07

## 2024-02-07 RX ORDER — DEXMEDETOMIDINE HYDROCHLORIDE 100 UG/ML
INJECTION, SOLUTION INTRAVENOUS
Status: DISCONTINUED | OUTPATIENT
Start: 2024-02-07 | End: 2024-02-07

## 2024-02-07 RX ADMIN — DEXMEDETOMIDINE 8 MCG: 100 INJECTION, SOLUTION, CONCENTRATE INTRAVENOUS at 12:02

## 2024-02-07 RX ADMIN — LIDOCAINE HYDROCHLORIDE 50 MG: 20 INJECTION INTRAVENOUS at 12:02

## 2024-02-07 RX ADMIN — SODIUM CHLORIDE: 9 INJECTION, SOLUTION INTRAVENOUS at 11:02

## 2024-02-07 RX ADMIN — ETOMIDATE 2 MG: 2 INJECTION, SOLUTION INTRAVENOUS at 12:02

## 2024-02-07 RX ADMIN — IPRATROPIUM BROMIDE AND ALBUTEROL SULFATE 3 ML: .5; 3 SOLUTION RESPIRATORY (INHALATION) at 10:02

## 2024-02-07 RX ADMIN — LIDOCAINE HYDROCHLORIDE 15 ML: 20 SOLUTION ORAL at 12:02

## 2024-02-07 RX ADMIN — ETOMIDATE 4 MG: 2 INJECTION, SOLUTION INTRAVENOUS at 12:02

## 2024-02-07 RX ADMIN — ETOMIDATE 5 MG: 2 INJECTION, SOLUTION INTRAVENOUS at 12:02

## 2024-02-07 RX ADMIN — VASOPRESSIN 1 UNITS: 20 INJECTION INTRAVENOUS at 12:02

## 2024-02-07 NOTE — DISCHARGE SUMMARY
[facility] - Short Stay Cardiac Unit  Cardiology  Discharge Summary      Patient Name: Rex Song  MRN: [id number]  Admission Date: 2/7/2024  Hospital Length of Stay: 0 days  Discharge Date and Time:  02/07/2024 2:00 PM  Attending Physician: Nemesio Raza MD    Discharging Provider: Lulu Gonzalez PA-C  Primary Care Physician: [facility]    HPI:   Patient of Dr. Raza. Last OV with Farhana Gonzales NP on 10/20/23.   78 y.o. male with CAD (PCI), ICM (s/p ICD), MS, CKD, chronic anemia, AF/AFL.     Background:   His AF was originally observed in July of 2021 which persisted. A holter monitor from August 2021 noted rate controlled AF. His EF was observed to decline to 30%. He underwent DCCV in 9/2021 with early recurrence of AF (EF noted to be 25% on KERRY). He was initiated on amiodarone at a follow-up visit with Dr. Jones in November of 2021. He was then observed to be in sinus rhythm in February of 2022. An echocardiogram in March of 2022 noted improvement in his LVEF to 45%. His AF recurred at this time for which he presented in 6/2022. We discussed trial again of DCCV with increased amiodarone dosing versus PVI. They were not interested. We stopped amiodarone and increased metoprolol.     10/2022 holter: sinus rhythm with average rate of 57 bpm. Seen by Farhana Gonzales. PET stress ordered.  3/2023 ECHO: LVEF 25-30%.     6/7/2023: Successful implantation of ICD Dual placed for primary intervention.     At last OV in EP clinic, he stated he felt well with no significant change in symptoms. EKG showed SR/AP/VS.     On 1/25 his device noted AF with RVR starting on 1/22/24. He was sick at that time but otherwise asymptomatic. Toprol was increased to 50 mg BID, as well as Amiodarone loading dose 400mg BID for 2 weeks then Amiodarone 200mg po qd.     Today, here for KERRY/DCCV. Feeling well, no acute complaints. Wife at bedside.     Of note, he recently underwent EGD/colonoscopy on 1/4/24  due to a decreased hemoglobin in the setting of chronic SANDRA without known source. 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 Patient then underwent ERCP on 1/31/24 for ampullectomy.    Procedure(s) (LRB):  Cardioversion or Defibrillation (N/A)  Transesophageal echo (KERRY) intra-procedure log documentation (N/A)     Indwelling Lines/Drains at time of discharge:  none      Hospital Course:  Patient underwent KERRY without evidence of DANNY thrombus. Proceeded with DCCV, converting from AF to sinus rhythm. Patient tolerated the procedure without any acute complications. Post-DCCV ECG revealed NSR at 68 bpm. Plan to continue all home medications including amiodarone, metoprolol, and eliquis. Instructed to return in 1 week for follow up ECG and in 4 weeks for clinic follow up with Dr. Raza or Farhana Gonzales NP.   Patient was assessed at bedside prior to discharge, they reported feeling well and denied chest discomfort, shortness of breath, palpitations, lightheadedness, or any other acute symptoms. Discharge instructions were discussed with patient and all questions were answered. Patient was discharged home in stable condition.      Goals of Care Treatment Preferences:  Code Status: Full Code      Significant Diagnostic Studies: KERRY - final report pending - prelim no DANNY thrombus, proceeded with DCCV      Pending Diagnostic Studies:       None            There are no hospital problems to display for this patient.    No new Assessment & Plan notes have been filed under this hospital service since the last note was generated.  Service: Cardiology      Discharged Condition: stable    Disposition: Home or Self Care    Follow Up:   Follow-up Information       Nemesio Raza MD. Schedule an appointment as soon as possible for a visit in 1 month(s).    Specialties: Electrophysiology, Cardiology  Why: cardioversion follow-up  Contact information:  [address]  [facility]  [address]  [phone]                           Patient Instructions:   No discharge procedures on file.  Medications:  Reconciled Home Medications:      Medication List        CONTINUE taking these medications      amiodarone 200 MG Tab  Commonly known as: PACERONE  Take 1 tablet (200 mg total) by mouth once daily.     amLODIPine 5 MG tablet  Commonly known as: NORVASC  TAKE 1 TABLET EVERY DAY     apixaban 2.5 mg Tab  Commonly known as: ELIQUIS  Take 1 tablet (2.5 mg total) by mouth 2 (two) times daily.     cholecalciferol (vitamin D3) 25 mcg (1,000 unit) capsule  Commonly known as: VITAMIN D3  Take 1 capsule (1,000 Units total) by mouth once daily.     clopidogreL 75 mg tablet  Commonly known as: PLAVIX  Take 1 tablet (75 mg total) by mouth once daily.     irbesartan 150 MG tablet  Commonly known as: AVAPRO  Take 1 tablet (150 mg total) by mouth every evening.     levothyroxine 25 MCG tablet  Commonly known as: SYNTHROID  Take 1 tablet (25 mcg total) by mouth before breakfast.     metoprolol succinate 50 MG 24 hr tablet  Commonly known as: TOPROL-XL  Take 1 tablet (50 mg total) by mouth 2 (two) times daily.     nitroGLYCERIN 0.4 MG SL tablet  Commonly known as: NITROSTAT  Place 1 tablet (0.4 mg total) under the tongue every 5 (five) minutes as needed.     omeprazole 40 MG capsule  Commonly known as: PRILOSEC  TAKE 1 CAPSULE EVERY MORNING     rosuvastatin 40 MG Tab  Commonly known as: CRESTOR  TAKE 1 TABLET BY MOUTH EVERY DAY              Time spent on the discharge of patient: 35 minutes    Lulu Gonzalez PA-C  Cardiology  [facility] - Short Stay Cardiac Unit

## 2024-02-07 NOTE — DISCHARGE INSTRUCTIONS
Medications:  -Continue to take your home medications as listed on your medication list after you are discharged.    Diet  -You may resume oral intake after you are discharged, as long you have no swallowing difficulties.    Because you have received sedation for this procedure:  -Limit activity for the remainder of the day.  -Do not smoke for at least 6 hours and until you are fully awake and alert.  -Do not drink alcoholic beverage for 24 hours.  -Do not drive for 24 hours.  -Defer important decision making until the following day.     Go to the Emergency Department if you develop:   -Bleeding  -Weakness or numbness  -Visual, gait or speech disturbance  -New chest pain, palpitations, shortness of breath, rapid heart beat, or fainting  -Fever    Follow up:  -EKG in 1 week.  -Dr. Raza or Farhana Gonzales NP in 1 month. Call or message the office to schedule.

## 2024-02-07 NOTE — TRANSFER OF CARE
"Anesthesia Transfer of Care Note    Patient: Rex Song    Procedure(s) Performed: Procedure(s) (LRB):  Cardioversion or Defibrillation (N/A)  Transesophageal echo (KERRY) intra-procedure log documentation (N/A)    Patient location: PACU    Anesthesia Type: MAC    Transport from OR: Transported from OR on 2-3 L/min O2 by NC with adequate spontaneous ventilation    Post pain: adequate analgesia    Post assessment: no apparent anesthetic complications    Post vital signs: stable    Level of consciousness: responds to stimulation    Nausea/Vomiting: no nausea/vomiting    Complications: none    Transfer of care protocol was followed      Last vitals: Visit Vitals  BP (!) 116/56 (BP Location: Right arm, Patient Position: Lying)   Pulse 77   Temp 36.5 °C (97.7 °F) (Temporal)   Resp (!) 22   Ht 5' 3" (1.6 m)   Wt 56.7 kg (125 lb)   SpO2 100%   BMI 22.14 kg/m²     "

## 2024-02-07 NOTE — HOSPITAL COURSE
Patient underwent KERRY without evidence of DANNY thrombus. Proceeded with DCCV, converting from AF to sinus rhythm. Patient tolerated the procedure without any acute complications. Post-DCCV ECG revealed NSR at 68 bpm. Plan to continue all home medications including amiodarone, metoprolol, and eliquis. Instructed to return in 1 week for follow up ECG and in 4 weeks for clinic follow up with Dr. Raza or Farhana Gonzales NP.   Patient was assessed at bedside prior to discharge, they reported feeling well and denied chest discomfort, shortness of breath, palpitations, lightheadedness, or any other acute symptoms. Discharge instructions were discussed with patient and all questions were answered. Patient was discharged home in stable condition.

## 2024-02-07 NOTE — H&P
[facility]  Cardiology  KERRY/DCCV History & Physical      Rex Song  YOB: 1945  Medical Record Number:  [id number]  Attending Physician:  Nemesio Raza MD   Date of Admission: 2/7/2024       Hospital Day:  0  Current Principal Problem:  Atrial fibrillation      History     Cc: KERRY/DCCV for AF    HPI  Patient of Dr. Raza. Last OV with Farhana Gonzales NP on 10/20/23.   78 y.o. male with CAD (PCI), ICM (s/p ICD), MS, CKD, chronic anemia, AF/AFL.     Background:   His AF was originally observed in July of 2021 which persisted. A holter monitor from August 2021 noted rate controlled AF. His EF was observed to decline to 30%. He underwent DCCV in 9/2021 with early recurrence of AF (EF noted to be 25% on KERRY). He was initiated on amiodarone at a follow-up visit with Dr. Jones in November of 2021. He was then observed to be in sinus rhythm in February of 2022. An echocardiogram in March of 2022 noted improvement in his LVEF to 45%. His AF recurred at this time for which he presented in 6/2022. We discussed trial again of DCCV with increased amiodarone dosing versus PVI. They were not interested. We stopped amiodarone and increased metoprolol.    10/2022 holter: sinus rhythm with average rate of 57 bpm. Seen by Farhana Gonzales. PET stress ordered.  3/2023 ECHO: LVEF 25-30%.     6/7/2023: Successful implantation of ICD Dual placed for primary intervention.    At last OV in EP clinic, he stated he felt well with no significant change in symptoms. EKG showed SR/AP/VS.    On 1/25 his device noted AF with RVR starting on 1/22/24. He was sick at that time but otherwise asymptomatic. Toprol was increased to 50 mg BID, as well as Amiodarone loading dose 400mg BID for 2 weeks then Amiodarone 200mg po qd.    Today, here for KERRY/DCCV. Feeling well, no acute complaints. Wife at bedside.    Of note, he recently underwent EGD/colonoscopy on 1/4/24 due to a decreased hemoglobin in  the setting of chronic SANDRA without known source. 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 Patient then underwent ERCP on 1/31/24 for ampullectomy.    Rate/rhythm control: amiodarone 400mg BID, toprol 50mg BID  Anticoagulant/antiplatelets: eliquis 2.5mg BID (age, CKD), plavix   ECG: AF, 89 bpm  Platelet count: 182  INR: 1.0    History of stroke:  no  Dysphagia or odynophagia:  no  Liver Disease, esophageal disease, or known varices:  Had Schatzki ring in past; recent EGD on 1/4/24 noted normal esophagus. 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  Upper GI Bleeding:  no  Snoring:  no   Sleep Apnea:  no  Prior neck surgery or radiation:  no  History of anesthetic difficulties:  no  Family history of anesthetic difficulties:  no  Last oral intake: last pm   Able to move neck in all directions:  yes  GLP-1 Use: no        Medications - Outpatient  Prior to Admission medications    Medication Sig Start Date End Date Taking? Authorizing Provider   amiodarone (PACERONE) 200 MG Tab Take 1 tablet (200 mg total) by mouth once daily. 1/29/24 1/28/25 Yes Nemesio Raza MD   amLODIPine (NORVASC) 5 MG tablet TAKE 1 TABLET EVERY DAY 3/9/23  Yes Reshma Jones MD   apixaban (ELIQUIS) 2.5 mg Tab Take 1 tablet (2.5 mg total) by mouth 2 (two) times daily. 8/15/23  Yes Nemesio Raza MD   cholecalciferol, vitamin D3, (VITAMIN D3) 25 mcg (1,000 unit) capsule Take 1 capsule (1,000 Units total) by mouth once daily. 6/21/22  Yes Jana Marrero MD   clopidogreL (PLAVIX) 75 mg tablet Take 1 tablet (75 mg total) by mouth once daily. 12/4/23  Yes Manav Finch MD   irbesartan (AVAPRO) 150 MG tablet Take 1 tablet (150 mg total) by mouth every evening. 2/16/23  Yes Reshma Jones MD   levothyroxine (SYNTHROID) 25 MCG tablet Take 1 tablet (25 mcg total) by mouth before breakfast. 4/27/23 4/26/24 Yes Jana Marrero MD   metoprolol succinate (TOPROL-XL) 50 MG 24  hr tablet Take 1 tablet (50 mg total) by mouth 2 (two) times daily. 1/26/24  Yes Nemesio Raza MD   omeprazole (PRILOSEC) 40 MG capsule TAKE 1 CAPSULE EVERY MORNING 11/29/23  Yes Michelle Ulloa APRN, ANP   rosuvastatin (CRESTOR) 40 MG Tab TAKE 1 TABLET BY MOUTH EVERY DAY 5/26/23  Yes Reshma Jones MD   nitroGLYCERIN (NITROSTAT) 0.4 MG SL tablet Place 1 tablet (0.4 mg total) under the tongue every 5 (five) minutes as needed. 3/24/14   Mayank Borrero MD         Medications - Current  Scheduled Meds:  Continuous Infusions:  PRN Meds:.      Allergies  Review of patient's allergies indicates:   Allergen Reactions    Cortisone      Other reaction(s): Itching         Past Medical History  Past Medical History:   Diagnosis Date    Bilateral renal cysts     CHF (congestive heart failure)     CKD (chronic kidney disease) stage 4, GFR 15-29 ml/min     Hematuria, unspecified     Hypertension     Iron deficiency anemia     Personal history of colonic polyps 01/01/2005    Proteinuria     PVD (peripheral vascular disease)     Vitamin D deficiency          Past Surgical History  Past Surgical History:   Procedure Laterality Date    COLONOSCOPY N/A 4/12/2022    Procedure: COLONOSCOPY;  Surgeon: Earl Hall MD;  Location: [facility];  Service: Endoscopy;  Laterality: N/A;    COLONOSCOPY N/A 1/4/2024    Procedure: COLONOSCOPY with pediatric colonoscope;  Surgeon: Yosvany Dominique MD;  Location: [facility];  Service: Endoscopy;  Laterality: N/A;    CORONARY ANGIOPLASTY WITH STENT PLACEMENT  08/18/2000    LCX stent/ PDA stent    ERCP N/A 1/31/2024    Procedure: ERCP (ENDOSCOPIC RETROGRADE CHOLANGIOPANCREATOGRAPHY);  Surgeon: Earl Hall MD;  Location: [address]);  Service: Endoscopy;  Laterality: N/A;  1/10/24: instructions sent via email. awaiting plavix and eliquis approval from Nemesio Carr. direct message sent-GD  1/29/24:Approval for eliquis in telephone encounter, awaiting plavix hold.-GD 1/30-pt's  wife confirmed appt-Kpv    ESOPHAGOGASTRODUODENOSCOPY N/A 4/11/2022    Procedure: EGD (ESOPHAGOGASTRODUODENOSCOPY);  Surgeon: Yosvany Dominique MD;  Location: [facility] ENDO;  Service: Endoscopy;  Laterality: N/A;    ESOPHAGOGASTRODUODENOSCOPY N/A 1/4/2024    Procedure: EGD (ESOPHAGOGASTRODUODENOSCOPY) with side viewing scope;  Surgeon: Yosvany Dominique MD;  Location: [facility] ENDO;  Service: Endoscopy;  Laterality: N/A;    INSERTION, ICD, DUAL CHAMBER Left 6/7/2023    Procedure: Insertion, ICD, Dual Chamber;  Surgeon: Nemesio Rzaa MD;  Location: [facility] EP LAB;  Service: Cardiology;  Laterality: Left;  ICM, Dual ICD, SJM, MAC, [place], 3 Prep    INTRALUMINAL GASTROINTESTINAL TRACT IMAGING VIA CAPSULE N/A 5/4/2022    Procedure: IMAGING PROCEDURE, GI TRACT, INTRALUMINAL, VIA CAPSULE;  Surgeon: Yosvany Dominique MD;  Location: [facility];  Service: Endoscopy;  Laterality: N/A;         Social History  Social History     Socioeconomic History    Marital status:    Tobacco Use    Smoking status: Former     Current packs/day: 0.25     Types: Cigarettes    Smokeless tobacco: Never   Substance and Sexual Activity    Alcohol use: Not Currently    Drug use: Never    Sexual activity: Not Currently         ROS  Review of Systems   Constitutional: Negative for chills.   HENT: Negative.     Eyes: Negative.    Cardiovascular:  Negative for chest pain, dyspnea on exertion and palpitations.   Respiratory: Negative.  Negative for shortness of breath and sleep disturbances due to breathing.    Endocrine: Negative.    Musculoskeletal: Negative.    Gastrointestinal:  Negative for hematemesis, melena, nausea and vomiting.   Genitourinary: Negative.    Neurological: Negative.    Psychiatric/Behavioral: Negative.  Negative for altered mental status.    Allergic/Immunologic: Negative.    Physical Examination         Vital Signs  24 Hour VS Range    Temp:  [98.6 °F (37 °C)]   Pulse:  [88]   Resp:  [21]   BP: (121-137)/(62-64)   SpO2:  [100 %]   No  "intake or output data in the 24 hours ending 02/07/24 0956      Physical Exam:   Physical Exam  Constitutional:       General: He is not in acute distress.     Appearance: Normal appearance.   HENT:      Head: Normocephalic.      Mouth/Throat:      Mouth: Mucous membranes are moist.   Cardiovascular:      Rate and Rhythm: Normal rate. Rhythm irregular.   Pulmonary:      Effort: Pulmonary effort is normal.      Breath sounds: Normal breath sounds.   Abdominal:      Palpations: Abdomen is soft.   Musculoskeletal:      Right lower leg: No edema.      Left lower leg: No edema.   Skin:     General: Skin is warm.   Neurological:      Mental Status: He is alert and oriented to person, place, and time.               Data       Recent Labs   Lab 02/06/24  1418   WBC 6.55   HGB 9.3*   HCT 33.4*           Recent Labs   Lab 02/06/24  1418   INR 1.0        Recent Labs   Lab 02/06/24  1418      K 3.6   *   CO2 25   BUN 16   CREATININE 1.75*   ANIONGAP 6*   CALCIUM 8.3*        No results for input(s): "PROT", "ALBUMIN", "BILITOT", "ALKPHOS", "AST", "ALT" in the last 168 hours.     No results for input(s): "TROPONINI" in the last 168 hours.     No results found for: "BNP"    No results for input(s): "LABBLOO" in the last 168 hours.         Assessment & Plan     #Atrial fibrillation  - proceed with KERRY/DCCV      TTE 3/22/23:  The left ventricle is mildly enlarged measuring 5.3 cm with severely decreased systolic function.  There is moderate left ventricular global hypokinesis. The estimated ejection fraction is 25-30%. Additionally the basal inferior and inferolateral walls are akinetic.  Normal right ventricular size with normal right ventricular systolic function.  Grade I left ventricular diastolic dysfunction.  Normal central venous pressure (3 mmHg).      -No absolute contraindications of esophageal stricture, tumor, perforation, laceration,or diverticulum and/or active GI bleed.  -The risks, benefits & " alternatives of the procedure were explained to the patient.  -The risks of transesophageal echo include but are not limited to:  Dental trauma, esophageal trauma/perforation, bleeding, laryngospasm/brochospasm, aspiration, sore throat/hoarseness, & dislodgement of the endotracheal tube/nasogastric tube (where applicable).  -The risks of moderate sedation include hypotension, respiratory depression, arrhythmias, bronchospasm, & death.  -Prior to procedure, extensive discussion with patient regarding risks and benefits of DCCV at bedside today. The patient voices understanding, all questions have been answered, and patient would like to proceed.  -Informed consent was obtained. The patient is agreeable to proceed with the procedure and all questions and concerns addressed.    Case was discussed with an attending physician prior to procedure.    Lulu Gonzalez PA-C  [facility]

## 2024-02-07 NOTE — ANESTHESIA PREPROCEDURE EVALUATION
02/07/2024  Rex Song is a 79 y.o., male.  Pre-operative evaluation for Procedure(s) (LRB):  Cardioversion or Defibrillation (N/A)  Transesophageal echo (KERRY) intra-procedure log documentation (N/A)    Rex Song is a 79 y.o. male   2/7/24 @ 0945:Pt presented with productive cough x 7 days (beige sputum), coughing during encounter with nurse, coarse breath sounds and some posterior wheezing.   Discussed with Cards fellow. He and staff will determine urgency of procedure and whether to cancel for today to admit patient and optimize prior to proceeding.     Patient Active Problem List   Diagnosis    CAD (coronary artery disease)    Post PTCA    Essential hypertension    Mixed hyperlipidemia    AAA (abdominal aortic aneurysm)    PVD (peripheral vascular disease)    Multiple sclerosis    Dysphagia    Colon cancer screening    Benign neoplasm of colon    Screening for colon cancer    CKD (chronic kidney disease) stage 4, GFR 15-29 ml/min    Tobacco abuse    Complex renal cyst    Atrial fibrillation    HFrEF (heart failure with reduced ejection fraction)    Acute renal injury    Hypothyroidism    Thrombocytopenia    GI bleed    Acute blood loss anemia    Anemia    Weakness    Other iron deficiency anemias    ICD (implantable cardioverter-defibrillator) in place    On anticoagulant therapy    Ampullary adenoma    Polyp of colon       Past Surgical History:   Procedure Laterality Date    COLONOSCOPY N/A 4/12/2022    Procedure: COLONOSCOPY;  Surgeon: Earl Hall MD;  Location: [facility] ENDO;  Service: Endoscopy;  Laterality: N/A;    COLONOSCOPY N/A 1/4/2024    Procedure: COLONOSCOPY with pediatric colonoscope;  Surgeon: Yosvany Dominique MD;  Location: [facility] ENDO;  Service: Endoscopy;  Laterality: N/A;    CORONARY ANGIOPLASTY WITH STENT PLACEMENT  08/18/2000    LCX stent/ PDA stent    ERCP N/A 1/31/2024     Procedure: ERCP (ENDOSCOPIC RETROGRADE CHOLANGIOPANCREATOGRAPHY);  Surgeon: Earl Hall MD;  Location: [facility] ENDO ([facility] FLR);  Service: Endoscopy;  Laterality: N/A;  1/10/24: instructions sent via email. awaiting plavix and eliquis approval from Nemesio Carr. direct message sent-GD  1/29/24:Approval for eliquis in telephone encounter, awaiting plavix hold.-GD  1/30-pt's wife confirmed appt-Kpv    ESOPHAGOGASTRODUODENOSCOPY N/A 4/11/2022    Procedure: EGD (ESOPHAGOGASTRODUODENOSCOPY);  Surgeon: Yosvany Dominique MD;  Location: [facility] ENDO;  Service: Endoscopy;  Laterality: N/A;    ESOPHAGOGASTRODUODENOSCOPY N/A 1/4/2024    Procedure: EGD (ESOPHAGOGASTRODUODENOSCOPY) with side viewing scope;  Surgeon: Yosvany Dominique MD;  Location: [facility] ENDO;  Service: Endoscopy;  Laterality: N/A;    INSERTION, ICD, DUAL CHAMBER Left 6/7/2023    Procedure: Insertion, ICD, Dual Chamber;  Surgeon: Nemesio Raza MD;  Location: [facility] EP LAB;  Service: Cardiology;  Laterality: Left;  ICM, Dual ICD, SJM, MAC, [place], 3 Prep    INTRALUMINAL GASTROINTESTINAL TRACT IMAGING VIA CAPSULE N/A 5/4/2022    Procedure: IMAGING PROCEDURE, GI TRACT, INTRALUMINAL, VIA CAPSULE;  Surgeon: Yosvany Dominique MD;  Location: [facility] ENDO;  Service: Endoscopy;  Laterality: N/A;       Social History     Socioeconomic History    Marital status:    Tobacco Use    Smoking status: Former     Current packs/day: 0.25     Types: Cigarettes    Smokeless tobacco: Never   Substance and Sexual Activity    Alcohol use: Not Currently    Drug use: Never    Sexual activity: Not Currently       No current facility-administered medications on file prior to encounter.     Current Outpatient Medications on File Prior to Encounter   Medication Sig Dispense Refill    amiodarone (PACERONE) 200 MG Tab Take 1 tablet (200 mg total) by mouth once daily. 30 tablet 11    amLODIPine (NORVASC) 5 MG tablet TAKE 1 TABLET EVERY DAY 90 tablet 3    apixaban (ELIQUIS) 2.5 mg Tab Take 1  tablet (2.5 mg total) by mouth 2 (two) times daily. 180 tablet 3    cholecalciferol, vitamin D3, (VITAMIN D3) 25 mcg (1,000 unit) capsule Take 1 capsule (1,000 Units total) by mouth once daily. 30 capsule 1    clopidogreL (PLAVIX) 75 mg tablet Take 1 tablet (75 mg total) by mouth once daily. 90 tablet 3    irbesartan (AVAPRO) 150 MG tablet Take 1 tablet (150 mg total) by mouth every evening. 90 tablet 3    levothyroxine (SYNTHROID) 25 MCG tablet Take 1 tablet (25 mcg total) by mouth before breakfast. 30 tablet 11    metoprolol succinate (TOPROL-XL) 50 MG 24 hr tablet Take 1 tablet (50 mg total) by mouth 2 (two) times daily. 180 tablet 3    nitroGLYCERIN (NITROSTAT) 0.4 MG SL tablet Place 1 tablet (0.4 mg total) under the tongue every 5 (five) minutes as needed. 25 tablet 5    omeprazole (PRILOSEC) 40 MG capsule TAKE 1 CAPSULE EVERY MORNING 90 capsule 3    rosuvastatin (CRESTOR) 40 MG Tab TAKE 1 TABLET BY MOUTH EVERY DAY 90 tablet 3       Review of patient's allergies indicates:   Allergen Reactions    Cortisone      Other reaction(s): Itching         CBC:   Recent Labs     02/06/24  1418   WBC 6.55   RBC 4.15*   HGB 9.3*   HCT 33.4*      MCV 81*   MCH 22.4*   MCHC 27.8*       CMP:   Recent Labs     02/06/24  1418      K 3.6   *   CO2 25   BUN 16   CREATININE 1.75*   *   CALCIUM 8.3*       INR  Recent Labs     02/06/24  1418   INR 1.0   APTT 29.9         Diagnostic Studies:    EKG:   Results for orders placed or performed during the hospital encounter of 06/07/23   EKG 12-lead    Collection Time: 06/07/23  1:48 PM    Narrative    Test Reason : I49.9,    Vent. Rate : 071 BPM     Atrial Rate : 071 BPM     P-R Int : 180 ms          QRS Dur : 090 ms      QT Int : 396 ms       P-R-T Axes : 054 001 -56 degrees     QTc Int : 430 ms    Normal sinus rhythm  Anterolateral infarct ,age undetermined  Abnormal ECG  When compared with ECG of 07-JUN-2023 09:38,  Anterior infarct is now  "Present  Anterolateral infarct is now Present  unless lead placement has markedly  changed  T wave amplitude has decreased in Anterior leads  Confirmed by Aguilar GROVE MD ([id number]) on 6/7/2023 2:56:32 PM    Referred By: CHINA PINEDA           Confirmed By:Aguilar GROVE MD       TTE:  Results for orders placed or performed during the hospital encounter of 03/22/23   Echo   Result Value Ref Range    BSA 1.65 m2    TDI SEPTAL 0.06 m/s    LV LATERAL E/E' RATIO 11.33 m/s    LV SEPTAL E/E' RATIO 11.33 m/s    LA WIDTH 3.48 cm    TDI LATERAL 0.06 m/s    LVIDd 5.25 3.5 - 6.0 cm    IVS 0.72 0.6 - 1.1 cm    Posterior Wall 0.72 0.6 - 1.1 cm    LVIDs 4.17 (A) 2.1 - 4.0 cm    FS 21 28 - 44 %    LA volume 38.26 cm3    Sinus 2.71 cm    STJ 2.15 cm    Ascending aorta 2.66 cm    LV mass 129.32 g    LA size 3.16 cm    RVDD 2.61 cm    TAPSE 1.75 cm    RV S' 7.03 cm/s    Left Ventricle Relative Wall Thickness 0.27 cm    AV mean gradient 3 mmHg    AV valve area 1.54 cm2    AV Velocity Ratio 0.46     AV index (prosthetic) 0.46     MV valve area p 1/2 method 3.48 cm2    E/A ratio 0.91     Mean e' 0.06 m/s    E wave deceleration time 218.22 msec    MV "A" wave duration 15.13 msec    Pulm vein S/D ratio 1.26     LVOT diameter 2.06 cm    LVOT area 3.3 cm2    LVOT peak christel 0.51 m/s    LVOT peak VTI 10.97 cm    Ao peak christel 1.12 m/s    Ao VTI 23.80 cm    LVOT stroke volume 36.54 cm3    AV peak gradient 5 mmHg    E/E' ratio 11.33 m/s    MV Peak E Christel 0.68 m/s    MV stenosis pressure 1/2 time 63.28 ms    MV Peak A Christel 0.75 m/s    PV Peak S Christel 0.34 m/s    PV Peak D Christel 0.27 m/s    LV Systolic Volume 77.33 mL    LV Systolic Volume Index 46.6 mL/m2    LV Diastolic Volume 132.60 mL    LV Diastolic Volume Index 79.88 mL/m2    LA Volume Index 23.0 mL/m2    LV Mass Index 78 g/m2    RA Major Axis 3.99 cm    Left Atrium Minor Axis 4.01 cm    Left Atrium Major Axis 4.18 cm    LA Volume Index (Mod) 22.6 mL/m2    LA volume (mod) 37.58 cm3    RA Width 2.48 cm    Right " "Atrial Pressure (from IVC) 3 mmHg    EF 28 %    Narrative    · The left ventricle is mildly enlarged measuring 5.3 cm with severely   decreased systolic function.  · There is moderate left ventricular global hypokinesis. The estimated   ejection fraction is 25-30%. Additionally the basal inferior and   inferolateral walls are akinetic.  · Normal right ventricular size with normal right ventricular systolic   function.  · Grade I left ventricular diastolic dysfunction.  · Normal central venous pressure (3 mmHg).        EF   Date Value Ref Range Status   03/22/2023 28 % Final   07/20/2022 35 % Final     Nuc Stress EF   Date Value Ref Range Status   11/02/2022 37 % Final     Nuc Rest EF   Date Value Ref Range Status   11/02/2022 35  Final      Results for orders placed or performed during the hospital encounter of 06/24/15   2D Echo w/ Color Flow Doppler   Result Value Ref Range    EF + QEF 50 55 - 65    Diastolic Dysfunction No     Aortic Valve Regurgitation TRIVIAL     Est. PA Systolic Pressure 20.81     Mitral Valve Mobility NORMAL     Tricuspid Valve Regurgitation TRIVIAL        KERRY:  No results found for this or any previous visit.    Stress Test:  No results found for this or any previous visit.       LHC:  Results for orders placed during the hospital encounter of 09/22/21    Intra-Procedure Documentation    Narrative  KERRY performed in the Invasive Lab  - See Procedure Log link below for nursing documentation  - See KERRY order on Card Proc Tab for physician findings       PFT:  No results found for: "FEV1", "FVC", "TAH0NBO", "TLC", "DLCO"     ALLERGIES:     Review of patient's allergies indicates:   Allergen Reactions    Cortisone      Other reaction(s): Itching     LDA:          Lines/Drains/Airways       None                  Anesthesia Evaluation      Airway   Mallampati: II  TM distance: > 6 cm  Neck ROM: Normal ROM  Dental    (+) Intact    Pulmonary    Cardiovascular   (+) hypertension, CHF    Rate: Normal "    Neuro/Psych      GI/Hepatic/Renal    (+) chronic renal disease CKD    Endo/Other    (+) hypothyroidism  Abdominal                           Pre-op Assessment    I have reviewed the Patient Summary Reports.     I have reviewed the Nursing Notes. I have reviewed the NPO Status.   I have reviewed the Medications.     Review of Systems  Anesthesia Hx:  No problems with previous Anesthesia             Denies Family Hx of Anesthesia complications.    Denies Personal Hx of Anesthesia complications.                    Cardiovascular:     Hypertension       CHF                                 Pulmonary:  Pulmonary Normal                       Renal/:  Chronic Renal Disease, CKD                Hepatic/GI:  Hepatic/GI Normal                 Neurological:  Neurology Normal                                      Endocrine:   Hypothyroidism              Physical Exam  General: Well nourished    Airway:  Mallampati: II   Mouth Opening: Normal  TM Distance: > 6 cm  Tongue: Normal  Neck ROM: Normal ROM    Dental:  Intact    Chest/Lungs:  Normal Respiratory Rate    Heart:  Rate: Normal        Anesthesia Plan  Type of Anesthesia, risks & benefits discussed:    Anesthesia Type: Gen Natural Airway, MAC  Intra-op Monitoring Plan: Standard ASA Monitors  Post Op Pain Control Plan: multimodal analgesia and IV/PO Opioids PRN  Induction:  IV  Airway Plan: Direct, Post-Induction  Informed Consent: Informed consent signed with the Patient and all parties understand the risks and agree with anesthesia plan.  All questions answered. Patient consented to blood products? Yes  ASA Score: 4  Day of Surgery Review of History & Physical: H&P Update referred to the surgeon/provider.    Ready For Surgery From Anesthesia Perspective.     .

## 2024-02-07 NOTE — PROGRESS NOTES
Patient able to move from sitting to standing and taking some steps with standby assist. Tolerated well with no c/o dizziness. Patient and wife both state that they feel comfortable for discharge. Pt DC'd per MD order. Discharge instructions given including activity, future appointments, and when to call MD. Medications reviewed including when to take next dose. Telemetry and PIV DC'd, catheter tip intact. Pt off unit via wheelchair with RN transport and family to  area of garage.

## 2024-02-07 NOTE — NURSING
Pt brought to bay 10 cath recovery from procedure with CRNA booker and procedural RN Elizabeth for bedside handoff. Pt's vs taken and wnl. Pt is oriented to self only. Pt re-oriented to place, time, and location. Pt is free from s/s of pain and distress. Safety measures in place. Waiting for EKG tech to obtain 12 lead.

## 2024-02-07 NOTE — ANESTHESIA POSTPROCEDURE EVALUATION
Anesthesia Post Evaluation    Patient: Rex Song    Procedure(s) Performed: Procedure(s) (LRB):  Cardioversion or Defibrillation (N/A)  Transesophageal echo (KERRY) intra-procedure log documentation (N/A)    Final Anesthesia Type: general      Patient location during evaluation: PACU  Patient participation: Yes- Able to Participate  Level of consciousness: awake and alert  Post-procedure vital signs: reviewed and stable  Pain management: adequate  Airway patency: patent    PONV status at discharge: No PONV  Anesthetic complications: no      Cardiovascular status: hemodynamically stable  Respiratory status: spontaneous ventilation  Follow-up not needed.              Vitals Value Taken Time   BP 99/54 02/07/24 1316   Temp 36.5 °C (97.7 °F) 02/07/24 1230   Pulse 69 02/07/24 1318   Resp 19 02/07/24 1318   SpO2 94 % 02/07/24 1318   Vitals shown include unvalidated device data.      No case tracking events are documented in the log.      Pain/Al Score: Al Score: 9 (2/7/2024  1:00 PM)

## 2024-02-07 NOTE — PLAN OF CARE
Patient admitted to sscu room 7B accompanied by spouse. Patient is aao x4. Vss. No distress noted. Pre procedure admission completed. PIV started. Plan of care reviewed. Patient with frequent cough x 1 week, expiratory wheezing to posterior RUL noted. Team made aware. Breathing treatment ordered.

## 2024-02-08 LAB
AORTIC ROOT ANNULUS: 3.2 CM
BSA FOR ECHO PROCEDURE: 1.59 M2
STJ: 2.4 CM

## 2024-02-15 ENCOUNTER — TELEPHONE (OUTPATIENT)
Dept: CARDIOLOGY | Facility: HOSPITAL | Age: 79
End: 2024-02-15
Payer: MEDICARE

## 2024-02-15 DIAGNOSIS — Z95.810 ICD (IMPLANTABLE CARDIOVERTER-DEFIBRILLATOR) IN PLACE: Primary | ICD-10-CM

## 2024-02-15 DIAGNOSIS — I48.91 ATRIAL FIBRILLATION, UNSPECIFIED TYPE: ICD-10-CM

## 2024-02-15 NOTE — TELEPHONE ENCOUNTER
Spoke with pt's wife and reviewed medication changes per Dr. Raza. Instructed to stop taking Amiodarone. Appt scheduled for 2/21/24 to have reprogramming to device to decrease AMS base rate to 60 bpm. Pt's wife verbalized understanding of medication changes and thanked me for the call.

## 2024-02-15 NOTE — TELEPHONE ENCOUNTER
Pt's wife returned the call.  Pt asymptomatic and wife confirmed pt is compliant with all meds with no missed doses.  Informed the wife this information would be sent to Dr. Raza and should he have any recommendations she will receive a return call.  Otherwise the Device Clinic will continue to monitor. Also instructed the wife to please call the Device Clinic should anything change.  Understanding was verbalized.

## 2024-02-15 NOTE — TELEPHONE ENCOUNTER
Pt is post DCCV on 2/7/2024.  Recurrence of atrial fibrillation is noted. Episode ongoing since 2/14/24 at 10:36pm.  Ventricular rates appear controlled.    Notable meds: Amiodarone 200 mg daily, Metoprolol 50 mg daily, Eliquis 2.5mg BID    LVM for patient to assess symptoms and medication compliance. Previous note dated prior to DCCV specifies pt was asymptomatic to AF.

## 2024-02-19 ENCOUNTER — TELEPHONE (OUTPATIENT)
Dept: CARDIOLOGY | Facility: HOSPITAL | Age: 79
End: 2024-02-19
Payer: MEDICARE

## 2024-02-21 ENCOUNTER — CLINICAL SUPPORT (OUTPATIENT)
Dept: CARDIOLOGY | Facility: HOSPITAL | Age: 79
End: 2024-02-21
Attending: INTERNAL MEDICINE
Payer: MEDICARE

## 2024-02-21 ENCOUNTER — HOSPITAL ENCOUNTER (OUTPATIENT)
Dept: CARDIOLOGY | Facility: CLINIC | Age: 79
Discharge: HOME | End: 2024-02-21
Payer: MEDICARE

## 2024-02-21 DIAGNOSIS — I48.91 ATRIAL FIBRILLATION, UNSPECIFIED TYPE: ICD-10-CM

## 2024-02-21 DIAGNOSIS — Z95.810 ICD (IMPLANTABLE CARDIOVERTER-DEFIBRILLATOR) IN PLACE: ICD-10-CM

## 2024-02-21 DIAGNOSIS — I48.91 ATRIAL FIBRILLATION, UNSPECIFIED TYPE: Chronic | ICD-10-CM

## 2024-02-21 LAB
OHS QRS DURATION: 104 MS
OHS QTC CALCULATION: 475 MS

## 2024-02-21 PROCEDURE — 93283 PRGRMG EVAL IMPLANTABLE DFB: CPT | Mod: 26,HCNC,, | Performed by: INTERNAL MEDICINE

## 2024-02-21 PROCEDURE — 93005 ELECTROCARDIOGRAM TRACING: CPT | Mod: HCNC,S$GLB,, | Performed by: INTERNAL MEDICINE

## 2024-02-21 PROCEDURE — 93010 ELECTROCARDIOGRAM REPORT: CPT | Mod: HCNC,S$GLB,, | Performed by: INTERNAL MEDICINE

## 2024-02-21 PROCEDURE — 93283 PRGRMG EVAL IMPLANTABLE DFB: CPT | Mod: HCNC

## 2024-02-26 ENCOUNTER — TELEPHONE (OUTPATIENT)
Dept: ELECTROPHYSIOLOGY | Facility: CLINIC | Age: 79
End: 2024-02-26
Payer: MEDICARE

## 2024-02-28 ENCOUNTER — TELEPHONE (OUTPATIENT)
Dept: ELECTROPHYSIOLOGY | Facility: CLINIC | Age: 79
End: 2024-02-28
Payer: MEDICARE

## 2024-02-28 LAB
OHS CV AF BURDEN PERCENT: 17
OHS CV DC REMOTE DEVICE TYPE: NORMAL
OHS CV RV PACING PERCENT: 2.8 %

## 2024-03-04 ENCOUNTER — TELEPHONE (OUTPATIENT)
Dept: ELECTROPHYSIOLOGY | Facility: CLINIC | Age: 79
End: 2024-03-04
Payer: MEDICARE

## 2024-03-04 NOTE — TELEPHONE ENCOUNTER
----- Message from Sandra Bennett RN sent at 2/26/2024  1:07 PM CST -----  Offer 9:40am on 4/12  ----- Message -----  From: Claire Orantes MA  Sent: 2/26/2024  12:50 PM CST  To: Sandra Bennett RN    Hi Bridgett,    Can you assist with 6wks f/u in [place]

## 2024-03-06 ENCOUNTER — TELEPHONE (OUTPATIENT)
Dept: ELECTROPHYSIOLOGY | Facility: CLINIC | Age: 79
End: 2024-03-06
Payer: MEDICARE

## 2024-03-22 ENCOUNTER — TELEPHONE (OUTPATIENT)
Dept: ENDOSCOPY | Facility: HOSPITAL | Age: 79
End: 2024-03-22
Payer: MEDICARE

## 2024-03-22 NOTE — TELEPHONE ENCOUNTER
Telephoned pt to schedule xray with no answer.  Voicemail message left with direct contact number for pt to return call.

## 2024-04-05 ENCOUNTER — TELEPHONE (OUTPATIENT)
Dept: ELECTROPHYSIOLOGY | Facility: CLINIC | Age: 79
End: 2024-04-05
Payer: MEDICARE

## 2024-04-05 NOTE — TELEPHONE ENCOUNTER
S/p DCCV 2/7/2024  Pt has been having PAF since DCCV.  Amiodarone was stopped.        AF/AFL in progress since 4/2/24 with RVR  On Toprol XL 50 mg BID     Called and spoke with pt's wife. Pt is Asymptomatic    Asking for Avapro refill, no longer sees Dr. Jones. Advised needs to follow up with a Cardiology.    Next in clinic with LOI Gonzales NP 5/1/2024.      Will forward to Dr. Raza for review                  _____________

## 2024-04-06 ENCOUNTER — CLINICAL SUPPORT (OUTPATIENT)
Dept: CARDIOLOGY | Facility: HOSPITAL | Age: 79
End: 2024-04-06
Payer: MEDICARE

## 2024-04-06 ENCOUNTER — CLINICAL SUPPORT (OUTPATIENT)
Dept: CARDIOLOGY | Facility: HOSPITAL | Age: 79
End: 2024-04-06
Attending: INTERNAL MEDICINE
Payer: MEDICARE

## 2024-04-06 DIAGNOSIS — I48.91 UNSPECIFIED ATRIAL FIBRILLATION: ICD-10-CM

## 2024-04-06 DIAGNOSIS — Z95.810 PRESENCE OF AUTOMATIC (IMPLANTABLE) CARDIAC DEFIBRILLATOR: ICD-10-CM

## 2024-04-06 PROCEDURE — 93296 REM INTERROG EVL PM/IDS: CPT | Mod: HCNC | Performed by: INTERNAL MEDICINE

## 2024-04-06 PROCEDURE — 93295 DEV INTERROG REMOTE 1/2/MLT: CPT | Mod: HCNC,,, | Performed by: INTERNAL MEDICINE

## 2024-04-08 DIAGNOSIS — I10 ESSENTIAL HYPERTENSION: ICD-10-CM

## 2024-04-08 DIAGNOSIS — I25.10 CORONARY ARTERY DISEASE INVOLVING NATIVE CORONARY ARTERY WITHOUT ANGINA PECTORIS, UNSPECIFIED WHETHER NATIVE OR TRANSPLANTED HEART: ICD-10-CM

## 2024-04-08 RX ORDER — METOPROLOL SUCCINATE 100 MG/1
100 TABLET, EXTENDED RELEASE ORAL 2 TIMES DAILY
Qty: 180 TABLET | Refills: 3 | Status: SHIPPED | OUTPATIENT
Start: 2024-04-08

## 2024-04-08 NOTE — TELEPHONE ENCOUNTER
Orders received from Dr. Raza to increase Toprol XL to 100mg po BID    LVM on home phone of med increased.  In message, informed pt/wife that pt is currently taking 50 mg po BID and that pt can take 2 pills ( 50 mg each = 100mg) until new rx arrives.    Left clinic ph number if pt/wife has questions.

## 2024-04-17 LAB
OHS CV AF BURDEN PERCENT: 7
OHS CV DC REMOTE DEVICE TYPE: NORMAL
OHS CV ICD SHOCK: NO
OHS CV RV PACING PERCENT: 1 %

## 2024-04-19 DIAGNOSIS — Z98.890 HISTORY OF BILIARY DUCT STENT PLACEMENT: Primary | ICD-10-CM

## 2024-04-24 ENCOUNTER — HOSPITAL ENCOUNTER (OUTPATIENT)
Dept: RADIOLOGY | Facility: HOSPITAL | Age: 79
Discharge: HOME | End: 2024-04-24
Attending: INTERNAL MEDICINE
Payer: MEDICARE

## 2024-04-24 DIAGNOSIS — Z98.890 HISTORY OF BILIARY DUCT STENT PLACEMENT: ICD-10-CM

## 2024-04-24 PROCEDURE — 74019 RADEX ABDOMEN 2 VIEWS: CPT | Mod: TC,FY

## 2024-04-24 PROCEDURE — 74019 RADEX ABDOMEN 2 VIEWS: CPT | Mod: 26,,, | Performed by: RADIOLOGY

## 2024-04-29 DIAGNOSIS — I48.91 ATRIAL FIBRILLATION, UNSPECIFIED TYPE: Primary | ICD-10-CM

## 2024-04-29 NOTE — PROGRESS NOTES
Mr. Song is a patient of Dr. Raza and was last seen in clinic 10/25/2023.      Subjective:   Patient ID:  Rex Song is an 79 y.o. male who presents for follow up of Atrial Fibrillation and ICD  .     HPI:    Mr. Song is an 79 y.o. male with CAD (PCI), ICM, MS, CKD, chronic anemia, AF/AFL, ICD here for follow up.    Background:    Mr. Song has a hx of coronary artery disease (RCA , PCI to LCX/PDA) with a LVEF of 45%, hypertension, multiple sclerosis, stage 4 chronic kidney disease, blood loss anemia, and atrial fibrillation/flutter despite amiodarone therapy. His AF was originally observed in July of 2021 which persisted. A holter monitor from August 2021 noted rate controlled AF. His EF was observed to decline to 30%. He underwent DCCV in 9/2021 with early recurrence of AF (EF noted to be 25% on KERRY). He was initiated on amiodarone at a follow-up visit with Dr. Jones in November of 2021. He was then observed to be in sinus rhythm in February of 2022. An echocardiogram in March of 2022 noted improvement in his LVEF to 45%. He was admitted to the hospital in April of 2022 with fatigue, anemia, and melena requiring transfusions. EGD/colonoscopy were unrevealing. Video capsule was also unrevealing. His AF recurred at this time for which he presented in 6/2022. We discussed trial again of DCCV with increased amiodarone dosing versus PVI. They were not interested. We stopped amiodarone and increased metoprolol.    7/2022: ECHO noted LVEF of 35%    10/2022 holter: sinus rhythm with average rate of 57 bpm. Seen by Farhana Gonzales. PET stress ordered.    11/2022: PET stress test noted fixed perfusion abnormalities. Seen by Dr. Ly in interventional clinic. Plan for medical management.    3/2023 ECHO: LVEF 25-30%.    4/2023: Mr. Song returns for follow-up. He has no current complaints.  ECGs which show sinus rhythm or AF.  ECG is sinus rhythm with a rate of 57 bpm.  In summary, Mr. Song  is a  pleasant 78 year-old man with coronary artery disease (RCA , PCI to LCX/PDA) with a LVEF of 25-30%, hypertension, multiple sclerosis, stage 4 chronic kidney disease, blood loss anemia, and atrial fibrillation/flutter despite amiodarone therapy. Plan is to now do rate control. He is paroxysmal. His EF has declined despite sinus rhythm and despite being on GDMT. He is not a candidate for PCI. This patient has a chronic ischemic systolic cardiomyopathy with a prior myocardial infarction which occurred years ago and he is not a candidate for revascularization per interventional cardiology and an LVEF of 25-30% with NYHA class 2 heart failure symptoms that has persisted despite more than 3 months of maximally tolerated goal directed medical therapy consisting of metoprolol and irbesartan. We discussed the etiology and pathophysiology of sudden cardiac death in a patient population such as his and how an ICD may provide mortality benefit. Pt elected to proceed.    6/7/2023: Successful implantation of ICD Dual placed for primary intervention.    10/25/48563: He is 4.5 months s/p ICD implantation for primary prevention. Device and lead function WNL. Paroxysmal AF/AFL in rate control strategy. On eliquis 2.5mg BID - at appropriate dose for age and creatinine. Asymptomatic <1% AF burden. No ventricular arrhythmias. No RV pacing. No CHF symptoms. He feels well with no complaints.     Update (05/01/2024):    1/10/2024: underwent an upper endoscopy for severe anemia. VCE: adenomatous polyp of the ampulla; no clear source of anemia found. The biopsies of which demonstrated an adenoma.     1/25/2024: 5 ATPs with aborted shock on 1/24/24 due to AF with RVR  Toprol increased to 50 mg po BID.  Started Amiodarone loading dose 400mg po BID for 2 weeks then take Amiodarone 200mg po qd.    2/7/2024 DCCV after amiodarone load    2/15/2024: Pt Back in AF. Amiodarone was stopped.      4/8/2024: AF/AFL in progress since 4/2/24 with RVR.  Increased metoprolol to 100mg bid    Today he says he is feeling at baseline. Denies palpitations ever. No worsening REBOLLEDO, CP, LH, syncope reported.    He is currently taking eliquis 2.5mg BID (age, CKD) for stroke prophylaxis. Blood count is trending back up. He is currently being treated with toprol 100mg BID for HR control.  Kidney function is low, with a creatinine of 1.82 on 10/3/2023.    Device Interrogation (5/1/2024) reveals an intrinsic SB with stable lead and device function. AF 15%, RVR noted. Most recent 4/11/2024. No ventricular arrhythmias or treated episodes were noted.  He paces 48% in the RA and 0% in the RV. Estimated battery longevity 7.7 years.     I have personally reviewed the patient's EKG today, which shows APVS at 63bpm. LA interval is 208. QRS is 104. QTc is 472.    Relevant Cardiac Test Results:    KERRY (2/7/2024):    Left Ventricle: The left ventricle is normal in size. Normal wall thickness. Normal wall motion. There is severely reduced systolic function with a visually estimated ejection fraction of 25 - 30%.    Right Ventricle: Normal right ventricular cavity size. Wall thickness is normal. Right ventricle wall motion  is normal. Systolic function is normal.    Left Atrium: Left atrium is dilated. The left atrial appendage appears normal. The left atrial appendage has a windsock morphology. Appendage velocity is normal at greater than 40 cm/sec. There is no thrombus in the cavity.    Aortic Valve: The aortic valve is a trileaflet valve.    Mitral Valve: There is no stenosis.    Aorta: Aortic annulus is normal in size measuring 3.2 cm. Grade 5 moderate protruding atherosclerosis with severe thickening.    Current Outpatient Medications   Medication Sig Dispense Refill    cholecalciferol, vitamin D3, (VITAMIN D3) 25 mcg (1,000 unit) capsule Take 1 capsule (1,000 Units total) by mouth once daily. 30 capsule 1    clopidogreL (PLAVIX) 75 mg tablet Take 1 tablet (75 mg total) by mouth once  daily. 90 tablet 3    levothyroxine (SYNTHROID) 25 MCG tablet Take 1 tablet (25 mcg total) by mouth before breakfast. 30 tablet 11    metoprolol succinate (TOPROL-XL) 100 MG 24 hr tablet Take 1 tablet (100 mg total) by mouth 2 (two) times daily. 180 tablet 3    nitroGLYCERIN (NITROSTAT) 0.4 MG SL tablet Place 1 tablet (0.4 mg total) under the tongue every 5 (five) minutes as needed. 25 tablet 5    omeprazole (PRILOSEC) 40 MG capsule TAKE 1 CAPSULE EVERY MORNING 90 capsule 3    rosuvastatin (CRESTOR) 40 MG Tab TAKE 1 TABLET BY MOUTH EVERY DAY 90 tablet 3    amLODIPine (NORVASC) 5 MG tablet TAKE 1 TABLET EVERY DAY 90 tablet 3    apixaban (ELIQUIS) 2.5 mg Tab Take 1 tablet (2.5 mg total) by mouth 2 (two) times daily. 180 tablet 3    irbesartan (AVAPRO) 150 MG tablet Take 1 tablet (150 mg total) by mouth every evening. (Patient not taking: Reported on 5/1/2024) 90 tablet 3     No current facility-administered medications for this visit.       Review of Systems   Constitutional: Negative for malaise/fatigue.   Cardiovascular:  Negative for chest pain, dyspnea on exertion, irregular heartbeat, leg swelling and palpitations.   Respiratory:  Negative for shortness of breath.    Hematologic/Lymphatic: Negative for bleeding problem.   Skin:  Negative for rash.   Musculoskeletal:  Negative for myalgias.   Gastrointestinal:  Negative for hematemesis, hematochezia and nausea.   Genitourinary:  Negative for hematuria.   Neurological:  Negative for light-headedness.   Psychiatric/Behavioral:  Negative for altered mental status.    Allergic/Immunologic: Negative for persistent infections.       Objective:          /66   Pulse 63   Wt 57 kg (125 lb 10.6 oz)   BMI 22.26 kg/m²     Physical Exam  Vitals and nursing note reviewed.   Constitutional:       Appearance: Normal appearance. He is well-developed.   HENT:      Head: Normocephalic.      Nose: Nose normal.   Eyes:      Pupils: Pupils are equal, round, and reactive to  light.   Cardiovascular:      Rate and Rhythm: Normal rate and regular rhythm.   Pulmonary:      Effort: No respiratory distress.      Breath sounds: Normal breath sounds.   Chest:      Comments: ICD in situ  Musculoskeletal:         General: Normal range of motion.   Skin:     General: Skin is warm and dry.      Findings: No erythema.   Neurological:      Mental Status: He is alert and oriented to person, place, and time.   Psychiatric:         Speech: Speech normal.         Behavior: Behavior normal.           Lab Results   Component Value Date     (H) 02/23/2024    K 4.5 02/23/2024    MG 2.3 12/05/2023    BUN 22 (H) 02/23/2024    CREATININE 1.82 (H) 02/23/2024    ALT 12 10/24/2022    AST 21 10/24/2022    HGB 9.3 (L) 02/06/2024    HCT 33.4 (L) 02/06/2024    TSH 23.839 (H) 04/11/2022    LDLCALC 43.6 (L) 04/11/2022       Recent Labs   Lab 05/31/23  1530 12/11/23  0913 02/06/24  1418   INR 1.0 1.0 1.0       Assessment:     1. ICD (implantable cardioverter-defibrillator) in place    2. HFrEF (heart failure with reduced ejection fraction)    3. Infrarenal abdominal aortic aneurysm (AAA) without rupture    4. Coronary artery disease involving native heart with angina pectoris, unspecified vessel or lesion type    5. Essential hypertension    6. On anticoagulant therapy    7. Persistent atrial fibrillation        Plan:     In summary, Mr. Song is an 79 y.o. male with CAD (PCI), ICM, MS, CKD, chronic anemia, AF/AFL, ICD here for follow up.  Device and lead function WNL. No RV pacing. No worsening CHF symptoms.  In late jan pt was inappropriately treated for AF with RVR (ATPx5 with aborted shock). Loaded on amiodarone and cardioverted, but continued to have pAF despite amio. Amiodarone was then stopped and metoprolol increased to 100mg BID. No AF since shortly after that dose increase. Will continue to monitor rates if/when he has more AF to determine if metoprolol needs to be increased again. On eliquis 2.5mg  BID. Pt with chronic anemia. Pt has declined watchman. He has followed with GI. Plan is for iron infusions 6-12 mo. He no longer has a PCP and needs ongoing thyroid monitoring, etc. Also recommend he follow up with general cardiology for CHF med management. Referrals placed.    PCP and cardiology appts  Continue current medication regimen and device settings.   Follow up in device clinic as scheduled.   Follow up in EP clinic in 6 mo, sooner as needed.     *A copy of this note has been sent to Dr. Raza*    Follow up in about 6 months (around 11/1/2024).    ------------------------------------------------------------------    LOUANN Evans, NP-C  Cardiac Electrophysiology

## 2024-05-01 ENCOUNTER — OFFICE VISIT (OUTPATIENT)
Dept: ELECTROPHYSIOLOGY | Facility: CLINIC | Age: 79
End: 2024-05-01
Payer: MEDICARE

## 2024-05-01 ENCOUNTER — CLINICAL SUPPORT (OUTPATIENT)
Dept: CARDIOLOGY | Facility: HOSPITAL | Age: 79
End: 2024-05-01
Attending: INTERNAL MEDICINE
Payer: MEDICARE

## 2024-05-01 VITALS
SYSTOLIC BLOOD PRESSURE: 124 MMHG | BODY MASS INDEX: 22.26 KG/M2 | DIASTOLIC BLOOD PRESSURE: 66 MMHG | HEART RATE: 63 BPM | WEIGHT: 125.69 LBS

## 2024-05-01 DIAGNOSIS — Z95.810 ICD (IMPLANTABLE CARDIOVERTER-DEFIBRILLATOR) IN PLACE: ICD-10-CM

## 2024-05-01 DIAGNOSIS — I48.91 ATRIAL FIBRILLATION, UNSPECIFIED TYPE: ICD-10-CM

## 2024-05-01 DIAGNOSIS — I48.19 PERSISTENT ATRIAL FIBRILLATION: Chronic | ICD-10-CM

## 2024-05-01 DIAGNOSIS — Z79.01 ON ANTICOAGULANT THERAPY: ICD-10-CM

## 2024-05-01 DIAGNOSIS — Z95.810 ICD (IMPLANTABLE CARDIOVERTER-DEFIBRILLATOR) IN PLACE: Primary | ICD-10-CM

## 2024-05-01 DIAGNOSIS — I50.20 HFREF (HEART FAILURE WITH REDUCED EJECTION FRACTION): ICD-10-CM

## 2024-05-01 DIAGNOSIS — I25.119 CORONARY ARTERY DISEASE INVOLVING NATIVE HEART WITH ANGINA PECTORIS, UNSPECIFIED VESSEL OR LESION TYPE: ICD-10-CM

## 2024-05-01 DIAGNOSIS — I71.43 INFRARENAL ABDOMINAL AORTIC ANEURYSM (AAA) WITHOUT RUPTURE: ICD-10-CM

## 2024-05-01 DIAGNOSIS — I10 ESSENTIAL HYPERTENSION: Chronic | ICD-10-CM

## 2024-05-01 LAB
OHS QRS DURATION: 104 MS
OHS QTC CALCULATION: 472 MS

## 2024-05-01 PROCEDURE — 1157F ADVNC CARE PLAN IN RCRD: CPT | Mod: CPTII,S$GLB,, | Performed by: NURSE PRACTITIONER

## 2024-05-01 PROCEDURE — 1159F MED LIST DOCD IN RCRD: CPT | Mod: CPTII,S$GLB,, | Performed by: NURSE PRACTITIONER

## 2024-05-01 PROCEDURE — 1160F RVW MEDS BY RX/DR IN RCRD: CPT | Mod: CPTII,S$GLB,, | Performed by: NURSE PRACTITIONER

## 2024-05-01 PROCEDURE — 1101F PT FALLS ASSESS-DOCD LE1/YR: CPT | Mod: CPTII,S$GLB,, | Performed by: NURSE PRACTITIONER

## 2024-05-01 PROCEDURE — 93283 PRGRMG EVAL IMPLANTABLE DFB: CPT

## 2024-05-01 PROCEDURE — 3078F DIAST BP <80 MM HG: CPT | Mod: CPTII,S$GLB,, | Performed by: NURSE PRACTITIONER

## 2024-05-01 PROCEDURE — 99214 OFFICE O/P EST MOD 30 MIN: CPT | Mod: S$GLB,,, | Performed by: NURSE PRACTITIONER

## 2024-05-01 PROCEDURE — 1126F AMNT PAIN NOTED NONE PRSNT: CPT | Mod: CPTII,S$GLB,, | Performed by: NURSE PRACTITIONER

## 2024-05-01 PROCEDURE — 3074F SYST BP LT 130 MM HG: CPT | Mod: CPTII,S$GLB,, | Performed by: NURSE PRACTITIONER

## 2024-05-01 PROCEDURE — 99999 PR PBB SHADOW E&M-EST. PATIENT-LVL IV: CPT | Mod: PBBFAC,,, | Performed by: NURSE PRACTITIONER

## 2024-05-01 PROCEDURE — 93005 ELECTROCARDIOGRAM TRACING: CPT | Mod: S$GLB,,, | Performed by: NURSE PRACTITIONER

## 2024-05-01 PROCEDURE — 93010 ELECTROCARDIOGRAM REPORT: CPT | Mod: S$GLB,,, | Performed by: INTERNAL MEDICINE

## 2024-05-01 PROCEDURE — 93283 PRGRMG EVAL IMPLANTABLE DFB: CPT | Mod: 26,,, | Performed by: INTERNAL MEDICINE

## 2024-05-01 PROCEDURE — 3288F FALL RISK ASSESSMENT DOCD: CPT | Mod: CPTII,S$GLB,, | Performed by: NURSE PRACTITIONER

## 2024-05-01 RX ORDER — AMLODIPINE BESYLATE 5 MG/1
TABLET ORAL
Qty: 90 TABLET | Refills: 3 | Status: SHIPPED | OUTPATIENT
Start: 2024-05-01

## 2024-05-13 LAB
OHS CV AF BURDEN PERCENT: 14
OHS CV DC REMOTE DEVICE TYPE: NORMAL
OHS CV RV PACING PERCENT: 1 %

## 2024-05-30 ENCOUNTER — TELEPHONE (OUTPATIENT)
Dept: FAMILY MEDICINE | Facility: CLINIC | Age: 79
End: 2024-05-30
Payer: MEDICARE

## 2024-05-30 RX ORDER — ROSUVASTATIN CALCIUM 40 MG/1
40 TABLET, COATED ORAL DAILY
Qty: 90 TABLET | Refills: 3 | Status: SHIPPED | OUTPATIENT
Start: 2024-05-30

## 2024-05-30 NOTE — TELEPHONE ENCOUNTER
----- Message from Jaclyn Samuel sent at 5/29/2024 10:04 AM CDT -----  Contact: [phone]  Type:  Sooner Appointment Request    Caller is requesting a sooner appointment.  Caller declined first available appointment listed below.  Caller will not accept being placed on the waitlist and is requesting a message be sent to doctor.    Name of Caller:  Pt's wife Jessica Song    When is the first available appointment?  N/a    Symptoms:  Essential hypertension [I10]    Would the patient rather a call back or a response via [facility]? Call     Best Call Back Number:   [phone]    Additional Information:  pt's wife is asking for a call  back to rebecca an appt in the [place] location .  Please call back to assist.    Thank you

## 2024-06-03 NOTE — PROGRESS NOTES
[facility] Cardiology [address]     SUBJECTIVE:     History of Present Illness:  Patient is a 79 y.o. male presents to establish cardiac care. Previously seen by  - last visit . 6/22    Primary Diagnosis:   Hypertension: positive   DM: none  Smoker: positive  Family history of early CAD  Heart disease : atrial fibrillation with DCCV 2021;   PVD  AAA  ICD dual:    Chronic systolic heart failure   CAD:  right; stents CX and PDA (long time ago)   CKD 4   ROS  No chest pains  No shortness of breath;noPND or orthopnea  No syncope  No palpitations  No shocks  Activity: walks a little in the house; cleans the garage   Review of patient's allergies indicates:   Allergen Reactions    Cortisone      Other reaction(s): Itching       Past Medical History:   Diagnosis Date    Bilateral renal cysts     CHF (congestive heart failure)     CKD (chronic kidney disease) stage 4, GFR 15-29 ml/min     Hematuria, unspecified     Hypertension     Iron deficiency anemia     Personal history of colonic polyps 01/01/2005    Proteinuria     PVD (peripheral vascular disease)     Vitamin D deficiency        Past Surgical History:   Procedure Laterality Date    COLONOSCOPY N/A 4/12/2022    Procedure: COLONOSCOPY;  Surgeon: Earl Hall MD;  Location: [facility] ENDO;  Service: Endoscopy;  Laterality: N/A;    COLONOSCOPY N/A 1/4/2024    Procedure: COLONOSCOPY with pediatric colonoscope;  Surgeon: Yosvany Dominique MD;  Location: [facility] ENDO;  Service: Endoscopy;  Laterality: N/A;    CORONARY ANGIOPLASTY WITH STENT PLACEMENT  08/18/2000    LCX stent/ PDA stent    ECHOCARDIOGRAM,TRANSESOPHAGEAL N/A 2/7/2024    Procedure: Transesophageal echo (KERRY) intra-procedure log documentation;  Surgeon: China Mittal MD;  Location: [facility] EP LAB;  Service: Cardiology;  Laterality: N/A;  AF, KERRY/DCCV, MAC, ID, 3 prep *SJM ICD in situ*    ERCP N/A 1/31/2024    Procedure: ERCP (ENDOSCOPIC RETROGRADE CHOLANGIOPANCREATOGRAPHY);  Surgeon: Earl Hall MD;   "Location: [facility] ([facility] FLR);  Service: Endoscopy;  Laterality: N/A;  1/10/24: instructions sent via email. awaiting plavix and eliquis approval from Nemesio Carr. direct message sent-GD 1/29/24:Approval for eliquis in telephone encounter, awaiting plavix hold.-GD  1/30-pt's wife confirmed appt-Kpv    ESOPHAGOGASTRODUODENOSCOPY N/A 4/11/2022    Procedure: EGD (ESOPHAGOGASTRODUODENOSCOPY);  Surgeon: Yosvany Dominique MD;  Location: [facility];  Service: Endoscopy;  Laterality: N/A;    ESOPHAGOGASTRODUODENOSCOPY N/A 1/4/2024    Procedure: EGD (ESOPHAGOGASTRODUODENOSCOPY) with side viewing scope;  Surgeon: Yosvany Dominique MD;  Location: [facility];  Service: Endoscopy;  Laterality: N/A;    INSERTION, ICD, DUAL CHAMBER Left 6/7/2023    Procedure: Insertion, ICD, Dual Chamber;  Surgeon: Nemesio Raza MD;  Location: [facility] EP LAB;  Service: Cardiology;  Laterality: Left;  ICM, Dual ICD, SJM, MAC, AL, 3 Prep    INTRALUMINAL GASTROINTESTINAL TRACT IMAGING VIA CAPSULE N/A 5/4/2022    Procedure: IMAGING PROCEDURE, GI TRACT, INTRALUMINAL, VIA CAPSULE;  Surgeon: Yosvany Dominique MD;  Location: [facility];  Service: Endoscopy;  Laterality: N/A;    TREATMENT OF CARDIAC ARRHYTHMIA N/A 2/7/2024    Procedure: Cardioversion or Defibrillation;  Surgeon: Yosvany Glez MD;  Location: [facility] EP LAB;  Service: Cardiology;  Laterality: N/A;  AF, KERRY/DCCV, MAC, AL, 3 prep *SJM ICD in situ*           Past Hospitalization:   2/24: admitted for cardioversion;       Cardiac meds:  Eliquis 5 mg BID  Amlodipine 5 mg  Plavix 75 mg  Irbesartan 150 mg  Thyroid  Metoprolol succinate 100 mg BID  Rosuvastatin 40 mg         OBJECTIVE:     Vital Signs (Most Recent)  Vitals:    06/05/24 1130   BP: 136/73   Pulse: (!) 59   SpO2: 97%   Weight: 57.2 kg (125 lb 15.9 oz)   Height: 5' 5" (1.651 m)         Physical Exam:  Neck: normal carotids, no bruits; normal JVP  Lungs :clear  Heart: RR, normal S1,S2, no murmurs, no gallops  Abd: no masses; no bruits; " "  Exts: normal DP and PT pulses bilaterally, normal radials; no edema noted               Labs    " lipids great   : GFR 36; Hgb 9.3;     Diagnostic Results:    1.EK/24: atrial paced  2.Echo: 3/23: LVE, EF 25-30%  3. Stress test:PET : perfusion defect basal to mid inferior wall; small apical and inferolateral area : EF 35% - on medical therapy   4. cath  5. ICD dual 2023: [place]: last remote check  with [facility] 7 years;   6. US abd aorta: : 3.4 cm AAA with mural thrombus   Chart review:        ASSESSMENT/PLAN:     Ischemic cardiomyopathy: on medical therapy; ICD in place   CAD: asymptomatic  Hypertension: controlled  CKD followed by nephrology  AAA: stable  Atrial fibrillation: s/p cardioversion on eliquis  7. Last lipids at goal  Plan: 1. Needs to be on 2.5 mg Eliquis  2. Replace irbesartan with entresto 24/26 BID after one day free period  3. Continue metoprolol and amlodipine for now   4. Smoking cesesation needed   5. Return 3 months     Rashawn Mariscal MD    "

## 2024-06-05 ENCOUNTER — OFFICE VISIT (OUTPATIENT)
Dept: CARDIOLOGY | Facility: CLINIC | Age: 79
End: 2024-06-05
Payer: MEDICARE

## 2024-06-05 VITALS
WEIGHT: 126 LBS | DIASTOLIC BLOOD PRESSURE: 73 MMHG | HEIGHT: 65 IN | SYSTOLIC BLOOD PRESSURE: 136 MMHG | OXYGEN SATURATION: 97 % | BODY MASS INDEX: 20.99 KG/M2 | HEART RATE: 59 BPM

## 2024-06-05 DIAGNOSIS — I25.5 ISCHEMIC CARDIOMYOPATHY: ICD-10-CM

## 2024-06-05 DIAGNOSIS — Z95.810 ICD (IMPLANTABLE CARDIOVERTER-DEFIBRILLATOR) IN PLACE: ICD-10-CM

## 2024-06-05 DIAGNOSIS — Z95.810 PRESENCE OF AUTOMATIC (IMPLANTABLE) CARDIAC DEFIBRILLATOR: Primary | ICD-10-CM

## 2024-06-05 DIAGNOSIS — I50.20 HFREF (HEART FAILURE WITH REDUCED EJECTION FRACTION): ICD-10-CM

## 2024-06-05 PROCEDURE — 1157F ADVNC CARE PLAN IN RCRD: CPT | Mod: HCNC,CPTII,S$GLB, | Performed by: INTERNAL MEDICINE

## 2024-06-05 PROCEDURE — 1160F RVW MEDS BY RX/DR IN RCRD: CPT | Mod: HCNC,CPTII,S$GLB, | Performed by: INTERNAL MEDICINE

## 2024-06-05 PROCEDURE — 3288F FALL RISK ASSESSMENT DOCD: CPT | Mod: HCNC,CPTII,S$GLB, | Performed by: INTERNAL MEDICINE

## 2024-06-05 PROCEDURE — 1126F AMNT PAIN NOTED NONE PRSNT: CPT | Mod: HCNC,CPTII,S$GLB, | Performed by: INTERNAL MEDICINE

## 2024-06-05 PROCEDURE — 99214 OFFICE O/P EST MOD 30 MIN: CPT | Mod: HCNC,S$GLB,, | Performed by: INTERNAL MEDICINE

## 2024-06-05 PROCEDURE — 99999 PR PBB SHADOW E&M-EST. PATIENT-LVL III: CPT | Mod: PBBFAC,HCNC,, | Performed by: INTERNAL MEDICINE

## 2024-06-05 PROCEDURE — 3075F SYST BP GE 130 - 139MM HG: CPT | Mod: HCNC,CPTII,S$GLB, | Performed by: INTERNAL MEDICINE

## 2024-06-05 PROCEDURE — 1101F PT FALLS ASSESS-DOCD LE1/YR: CPT | Mod: HCNC,CPTII,S$GLB, | Performed by: INTERNAL MEDICINE

## 2024-06-05 PROCEDURE — 1159F MED LIST DOCD IN RCRD: CPT | Mod: HCNC,CPTII,S$GLB, | Performed by: INTERNAL MEDICINE

## 2024-06-05 PROCEDURE — 3078F DIAST BP <80 MM HG: CPT | Mod: HCNC,CPTII,S$GLB, | Performed by: INTERNAL MEDICINE

## 2024-06-05 RX ORDER — SACUBITRIL AND VALSARTAN 24; 26 MG/1; MG/1
1 TABLET, FILM COATED ORAL 2 TIMES DAILY
Qty: 180 TABLET | Refills: 1 | Status: SHIPPED | OUTPATIENT
Start: 2024-06-05

## 2024-06-20 ENCOUNTER — TELEPHONE (OUTPATIENT)
Dept: ELECTROPHYSIOLOGY | Facility: CLINIC | Age: 79
End: 2024-06-20
Payer: MEDICARE

## 2024-06-20 NOTE — TELEPHONE ENCOUNTER
Alert received for AF > 48 hrs with RVR  approx 35%  In progress since 6/16/2024    On  Toprol  mg po BID and Eliquis-compliant with meds    Spoke with pt's wife who pt is asymptomatic.  Both pt and his wife have a cold with congestion and cough      Will forward to Dr. Raza         ___________________

## 2024-06-21 NOTE — TELEPHONE ENCOUNTER
Reviewed this am remote and pt not longer in AF.    Will call pt and notify Dr. Ry DUGGAN for pt letting him know he is no longer in AF           No results found for: WBC, HGB, HCT, MCV, PLT  Lab Results   Component Value Date    ALT 28 05/29/2019    AST 22 05/29/2019    ALKPHOS 96 05/29/2019    BILITOT 0.5 05/29/2019     No results found for: TSH  Lab Results   Component Value Date    LABA1C 5.8 02/03/2020      Objective:   PHYSICAL EXAM:  There were no vitals taken for this visit. BP Readings from Last 5 Encounters:   02/03/20 122/84   05/29/19 122/70   01/15/18 128/70   07/20/17 118/70   01/16/17 124/78     Wt Readings from Last 5 Encounters:   02/03/20 257 lb (116.6 kg)   05/29/19 257 lb (116.6 kg)   01/15/18 245 lb (111.1 kg)   07/20/17 239 lb (108.4 kg)   01/16/17 259 lb (117.5 kg)   BP at home has been good    GENERAL:   · well-developed, well-nourished, alert, no distress. EYES:   · External findings: lids and lashes normal and conjunctivae and sclerae normal  · Eyes: no periorbital cellulitis. HENT:   · Normocephalic, atraumatic  · External nose and ears appear normal  · Mucous membranes are moist  · Hearing grossly normal.     NECK: No visible masses  LUNGS:    · Respiratory effort normal.  · No visualized signs of difficulty breathing or respiratory distress  SKIN: warm and dry  · No significant exanthematous lesions or discoloration noted on facial skin  PSYCH:    · Alert and oriented, able to follow commands  · Normal reasoning, insight good  · Normal affect  · No memory disturbance noted  NEURO:   No Facial Asymmetry (Cranial nerve 7 motor function) (limited exam to video visit)      No gaze palsy      Assessment and Plan:      Diagnosis Orders   1. Essential hypertension  Comprehensive Metabolic Panel   2. Hypercholesterolemia LDL goal < 160  Comprehensive Metabolic Panel    Lipid Panel   3. Prediabetes  Hemoglobin A1C   Plan below. INSTRUCTIONS  NEXT APPOINTMENT: Please call gynecologist to schedule PAP smear. Ask GYN to fax result to Dr. Melinda Bell at [phone]. · PLEASE GET FASTING BLOODWORK DRAWN SOON.       Virtual Visit (video visit) encounter employed to address concerns as mentioned above. A caregiver was present when appropriate. Due to this being a TeleHealth encounter (During BJZW-18 public health emergency), evaluation of the following organ systems was limited. Pursuant to the emergency declaration under the [facility], [address] [place] [address] authority and the [employer] Act, this Virtual Visit was conducted with patient's (and/or legal guardian's) consent, to reduce the patient's risk of exposure to COVID-19 and provide necessary medical care. The patient (and/or legal guardian) has also been advised to contact this office for worsening conditions or problems, and seek emergency medical treatment and/or call 911 if deemed necessary. Services were provided through a video synchronous discussion virtually to substitute for in-person clinic visit. Patient and provider were located at their individual homes. minutes: 11-20 minutes were spent on the digital evaluation and management of this patient. --Shalonda Bassett MD on 6/10/2020 at 9:32 AM    An electronic signature was used to authenticate this note.

## 2024-06-27 ENCOUNTER — TELEPHONE (OUTPATIENT)
Dept: CARDIOLOGY | Facility: CLINIC | Age: 79
End: 2024-06-27
Payer: MEDICARE

## 2024-06-27 NOTE — TELEPHONE ENCOUNTER
----- Message from Lui Shelley sent at 6/27/2024  2:35 PM CDT -----  Type: Call    Who Called:spouse   Does the patient know what this is regarding?:out of pocket expenses for new medication   Would the patient rather a call back or a response via [facility]? Call   Best Call Back Number: [phone]  Additional Information: caller stated she was told to call if it was too much sp pt can get back on original medication

## 2024-07-02 ENCOUNTER — DOCUMENTATION ONLY (OUTPATIENT)
Dept: CARDIOLOGY | Facility: CLINIC | Age: 79
End: 2024-07-02
Payer: MEDICARE

## 2024-07-02 RX ORDER — IRBESARTAN 150 MG/1
150 TABLET ORAL NIGHTLY
Qty: 90 TABLET | Refills: 3 | Status: SHIPPED | OUTPATIENT
Start: 2024-07-02 | End: 2025-07-02

## 2024-07-15 ENCOUNTER — CLINICAL SUPPORT (OUTPATIENT)
Dept: CARDIOLOGY | Facility: HOSPITAL | Age: 79
End: 2024-07-15
Attending: INTERNAL MEDICINE
Payer: MEDICARE

## 2024-07-15 ENCOUNTER — CLINICAL SUPPORT (OUTPATIENT)
Dept: CARDIOLOGY | Facility: HOSPITAL | Age: 79
End: 2024-07-15
Payer: MEDICARE

## 2024-07-15 DIAGNOSIS — Z45.02 ENCOUNTER FOR ADJUSTMENT AND MANAGEMENT OF AUTOMATIC IMPLANTABLE CARDIAC DEFIBRILLATOR: ICD-10-CM

## 2024-07-15 PROCEDURE — 93295 DEV INTERROG REMOTE 1/2/MLT: CPT | Mod: ,,, | Performed by: INTERNAL MEDICINE

## 2024-07-15 PROCEDURE — 93296 REM INTERROG EVL PM/IDS: CPT | Performed by: INTERNAL MEDICINE

## 2024-07-25 LAB
OHS CV AF BURDEN PERCENT: 13
OHS CV DC REMOTE DEVICE TYPE: NORMAL
OHS CV RV PACING PERCENT: 1 %

## 2024-10-14 ENCOUNTER — CLINICAL SUPPORT (OUTPATIENT)
Dept: CARDIOLOGY | Facility: HOSPITAL | Age: 79
End: 2024-10-14
Attending: INTERNAL MEDICINE
Payer: MEDICARE

## 2024-10-14 ENCOUNTER — CLINICAL SUPPORT (OUTPATIENT)
Dept: CARDIOLOGY | Facility: HOSPITAL | Age: 79
End: 2024-10-14
Payer: MEDICARE

## 2024-10-14 DIAGNOSIS — Z45.02 ENCOUNTER FOR ADJUSTMENT AND MANAGEMENT OF AUTOMATIC IMPLANTABLE CARDIAC DEFIBRILLATOR: ICD-10-CM

## 2024-10-14 PROCEDURE — 93295 DEV INTERROG REMOTE 1/2/MLT: CPT | Mod: ,,, | Performed by: INTERNAL MEDICINE

## 2024-10-14 PROCEDURE — 93296 REM INTERROG EVL PM/IDS: CPT | Performed by: INTERNAL MEDICINE

## 2024-10-18 LAB
OHS CV AF BURDEN PERCENT: 7
OHS CV DC REMOTE DEVICE TYPE: NORMAL
OHS CV RV PACING PERCENT: 1 %

## 2025-01-13 ENCOUNTER — CLINICAL SUPPORT (OUTPATIENT)
Dept: CARDIOLOGY | Facility: HOSPITAL | Age: 80
End: 2025-01-13

## 2025-01-13 ENCOUNTER — CLINICAL SUPPORT (OUTPATIENT)
Dept: CARDIOLOGY | Facility: HOSPITAL | Age: 80
End: 2025-01-13
Attending: INTERNAL MEDICINE
Payer: MEDICARE

## 2025-01-13 DIAGNOSIS — Z45.02 ENCOUNTER FOR ADJUSTMENT AND MANAGEMENT OF AUTOMATIC IMPLANTABLE CARDIAC DEFIBRILLATOR: ICD-10-CM

## 2025-01-13 PROCEDURE — 93296 REM INTERROG EVL PM/IDS: CPT | Mod: HCNC | Performed by: INTERNAL MEDICINE

## 2025-02-05 NOTE — TELEPHONE ENCOUNTER
Spoke with patient's wife and advised that Dr Raza reviewed the patient's holter monitor results which showed that his heart was in normal rhythm. Wife verbalized understanding and denies any questions at this time.    DISCHARGE

## 2025-02-17 LAB
OHS CV AF BURDEN PERCENT: 5
OHS CV DC REMOTE DEVICE TYPE: NORMAL
OHS CV RV PACING PERCENT: 1 %

## 2025-03-28 ENCOUNTER — TELEPHONE (OUTPATIENT)
Dept: CARDIOLOGY | Facility: CLINIC | Age: 80
End: 2025-03-28
Payer: MEDICARE

## 2025-03-28 NOTE — TELEPHONE ENCOUNTER
Left voicemail to patient.   Advise patient we receive [facility] refill request for omeprazole. Advise RX is over the counter, or reach out to your pcp for instructions of intake.     Eleanor PEREIRA     ----- Message from Matias sent at 3/28/2025 10:31 AM CDT -----  Contact: Enma  Type:  Patient CallWho Called: [facility] - [place] Does the patient know what this is regarding?: Requesting a call back about having a refill on Rx omeprazole (PRILOSEC) 40 MG capsule ; please advise Additional Information: [facility] - [place], [place] - [address] [facility] [address]Aunlh: [phone] Fax: [phone]

## 2025-03-30 DIAGNOSIS — I10 ESSENTIAL HYPERTENSION: Chronic | ICD-10-CM

## 2025-03-31 RX ORDER — AMLODIPINE BESYLATE 5 MG/1
5 TABLET ORAL
Qty: 90 TABLET | Refills: 3 | Status: SHIPPED | OUTPATIENT
Start: 2025-03-31

## 2025-04-07 DIAGNOSIS — I10 ESSENTIAL HYPERTENSION: ICD-10-CM

## 2025-04-07 DIAGNOSIS — I25.10 CORONARY ARTERY DISEASE INVOLVING NATIVE CORONARY ARTERY WITHOUT ANGINA PECTORIS, UNSPECIFIED WHETHER NATIVE OR TRANSPLANTED HEART: ICD-10-CM

## 2025-04-09 RX ORDER — METOPROLOL SUCCINATE 100 MG/1
100 TABLET, EXTENDED RELEASE ORAL 2 TIMES DAILY
Qty: 180 TABLET | Refills: 3 | Status: SHIPPED | OUTPATIENT
Start: 2025-04-09

## 2025-04-14 ENCOUNTER — CLINICAL SUPPORT (OUTPATIENT)
Dept: CARDIOLOGY | Facility: HOSPITAL | Age: 80
End: 2025-04-14
Attending: INTERNAL MEDICINE
Payer: MEDICARE

## 2025-04-14 ENCOUNTER — CLINICAL SUPPORT (OUTPATIENT)
Dept: CARDIOLOGY | Facility: HOSPITAL | Age: 80
End: 2025-04-14
Payer: MEDICARE

## 2025-04-14 DIAGNOSIS — Z45.02 ENCOUNTER FOR ADJUSTMENT AND MANAGEMENT OF AUTOMATIC IMPLANTABLE CARDIAC DEFIBRILLATOR: ICD-10-CM

## 2025-04-14 PROCEDURE — 93296 REM INTERROG EVL PM/IDS: CPT | Mod: HCNC | Performed by: INTERNAL MEDICINE

## 2025-04-14 PROCEDURE — 93295 DEV INTERROG REMOTE 1/2/MLT: CPT | Mod: HCNC,,, | Performed by: INTERNAL MEDICINE

## 2025-04-28 LAB
OHS CV AF BURDEN PERCENT: 5
OHS CV DC REMOTE DEVICE TYPE: NORMAL
OHS CV RV PACING PERCENT: 1 %

## 2025-04-29 ENCOUNTER — TELEPHONE (OUTPATIENT)
Dept: FAMILY MEDICINE | Facility: CLINIC | Age: 80
End: 2025-04-29
Payer: MEDICARE

## 2025-04-29 RX ORDER — ROSUVASTATIN CALCIUM 40 MG/1
40 TABLET, COATED ORAL
Qty: 90 TABLET | Refills: 3 | Status: SHIPPED | OUTPATIENT
Start: 2025-04-29

## 2025-04-29 NOTE — TELEPHONE ENCOUNTER
----- Message from Ashlee sent at 4/29/2025  2:34 PM CDT -----  Type:   Appointment Request Name of Caller:pt spouseWhen is the first available appointment?n/aSymptoms:est care reschedule Best Call Back Number: [phone] Information:

## 2025-05-01 NOTE — PROGRESS NOTES
Mr. Song is a patient of Dr. Raza and was last seen in clinic 5/1/2024.      Subjective:   Patient ID:  Rex Song is a 80 y.o. male who presents for follow up of ICD and Atrial Fibrillation  .     HPI:    Mr. Song is an 80 y.o. male with CAD (PCI), ICM, MS, CKD, chronic anemia, AF/AFL, ICD here for follow up.    Background:    Mr. Song has a hx of coronary artery disease (RCA , PCI to LCX/PDA) with a LVEF of 45%, hypertension, multiple sclerosis, stage 4 chronic kidney disease, blood loss anemia, and atrial fibrillation/flutter despite amiodarone therapy. His AF was originally observed in July of 2021 which persisted. A holter monitor from August 2021 noted rate controlled AF. His EF was observed to decline to 30%. He underwent DCCV in 9/2021 with early recurrence of AF (EF noted to be 25% on KERRY). He was initiated on amiodarone at a follow-up visit with Dr. Jones in November of 2021. He was then observed to be in sinus rhythm in February of 2022. An echocardiogram in March of 2022 noted improvement in his LVEF to 45%. He was admitted to the hospital in April of 2022 with fatigue, anemia, and melena requiring transfusions. EGD/colonoscopy were unrevealing. Video capsule was also unrevealing. His AF recurred at this time for which he presented in 6/2022. We discussed trial again of DCCV with increased amiodarone dosing versus PVI. They were not interested. We stopped amiodarone and increased metoprolol.    7/2022: ECHO noted LVEF of 35%    10/2022 holter: sinus rhythm with average rate of 57 bpm. Seen by Farhana Gonzales. PET stress ordered.    11/2022: PET stress test noted fixed perfusion abnormalities. Seen by Dr. Ly in interventional clinic. Plan for medical management.    3/2023 ECHO: LVEF 25-30%.    4/2023: Mr. Song returns for follow-up. He has no current complaints.  ECGs which show sinus rhythm or AF.  ECG is sinus rhythm with a rate of 57 bpm.  In summary, Mr. Song  is a  pleasant 78 year-old man with coronary artery disease (RCA , PCI to LCX/PDA) with a LVEF of 25-30%, hypertension, multiple sclerosis, stage 4 chronic kidney disease, blood loss anemia, and atrial fibrillation/flutter despite amiodarone therapy. Plan is to now do rate control. He is paroxysmal. His EF has declined despite sinus rhythm and despite being on GDMT. He is not a candidate for PCI. This patient has a chronic ischemic systolic cardiomyopathy with a prior myocardial infarction which occurred years ago and he is not a candidate for revascularization per interventional cardiology and an LVEF of 25-30% with NYHA class 2 heart failure symptoms that has persisted despite more than 3 months of maximally tolerated goal directed medical therapy consisting of metoprolol and irbesartan. We discussed the etiology and pathophysiology of sudden cardiac death in a patient population such as his and how an ICD may provide mortality benefit. Pt elected to proceed.    6/7/2023: Successful implantation of ICD Dual placed for primary intervention.    10/25/31385: He is 4.5 months s/p ICD implantation for primary prevention. Device and lead function WNL. Paroxysmal AF/AFL in rate control strategy. On eliquis 2.5mg BID - at appropriate dose for age and creatinine. Asymptomatic <1% AF burden. No ventricular arrhythmias. No RV pacing. No CHF symptoms. He feels well with no complaints.     Update (05/01/2024):    1/10/2024: underwent an upper endoscopy for severe anemia. VCE: adenomatous polyp of the ampulla; no clear source of anemia found. The biopsies of which demonstrated an adenoma.     1/25/2024: 5 ATPs with aborted shock on 1/24/24 due to AF with RVR  Toprol increased to 50 mg po BID.  Started Amiodarone loading dose 400mg po BID for 2 weeks then take Amiodarone 200mg po qd.    2/7/2024 DCCV after amiodarone load    2/15/2024: Pt Back in AF. Amiodarone was stopped.      4/8/2024: AF/AFL in progress since 4/2/24 with RVR.  Increased metoprolol to 100mg bid    5/1/2024: In late jan pt was inappropriately treated for AF with RVR (ATPx5 with aborted shock). Loaded on amiodarone and cardioverted, but continued to have pAF despite amio. Amiodarone was then stopped and metoprolol increased to 100mg BID. No AF since shortly after that dose increase. Will continue to monitor rates if/when he has more AF to determine if metoprolol needs to be increased again. On eliquis 2.5mg BID. Pt with chronic anemia. Pt has declined watchman. He has followed with GI. Plan is for iron infusions 6-12 mo. He no longer has a PCP and needs ongoing thyroid monitoring, etc. Also recommend he follow up with general cardiology for CHF med management. Referrals placed.    Update (05/08/2025):    6/20/2024:  Alert received for AF > 48 hrs with RVR  approx 35%  In progress since 6/16/2024  Spoke with pt's wife who pt is asymptomatic.  Both pt and his wife have a cold with congestion and cough  AF resolved.    Today he says he is feeling well. No cardiac complaints. Mr. Song reports no chest pain with exertion or at rest, palpitations, SOB, REBOLLEDO, dizziness, or syncope.    He is currently taking eliquis 2.5mg BID (age, wt, creatinine) for stroke prophylaxis and denies significant bleeding episodes. He is currently being treated with toprol 100mg BID for HR control.  Kidney function is low but stable, with a creatinine of 1.7 on 2/17/2025.    Device Interrogation (5/8/2025) reveals an intrinsic SB with stable lead and device function. AF episode 2/21/2025, max 4 hours, 23 min. Some RVR. SVTx60, c/w RVR on 2/21/2025. NSVTx32, c/w RVR. He paces 53% in the RA and <1% in the RV. Estimated battery longevity 6.7 years.     I have personally reviewed the patient's EKG today, which shows APVS at 68bpm. AZ interval is 208. QRS is 112. QTc is 440.    Relevant Cardiac Test Results:    KERRY (2/7/2024):    Left Ventricle: The left ventricle is normal in size. Normal wall thickness.  Normal wall motion. There is severely reduced systolic function with a visually estimated ejection fraction of 25 - 30%.    Right Ventricle: Normal right ventricular cavity size. Wall thickness is normal. Right ventricle wall motion  is normal. Systolic function is normal.    Left Atrium: Left atrium is dilated. The left atrial appendage appears normal. The left atrial appendage has a windsock morphology. Appendage velocity is normal at greater than 40 cm/sec. There is no thrombus in the cavity.    Aortic Valve: The aortic valve is a trileaflet valve.    Mitral Valve: There is no stenosis.    Aorta: Aortic annulus is normal in size measuring 3.2 cm. Grade 5 moderate protruding atherosclerosis with severe thickening.    Current Outpatient Medications   Medication Sig    amLODIPine (NORVASC) 5 MG tablet TAKE 1 TABLET EVERY DAY    amoxicillin-clavulanate 500-125mg (AUGMENTIN) 500-125 mg Tab Take 1 tablet (500 mg total) by mouth every 12 (twelve) hours for 10 days (Patient not taking: Reported on 8/21/2024)    apixaban (ELIQUIS) 2.5 mg Tab Take 1 tablet (2.5 mg total) by mouth 2 (two) times daily.    benzonatate (TESSALON) 200 MG capsule Take 1 capsule (200 mg total) by mouth 3 (three) times daily as needed for cough (Patient not taking: Reported on 8/21/2024)    cholecalciferol, vitamin D3, (VITAMIN D3) 25 mcg (1,000 unit) capsule Take 1 capsule (1,000 Units total) by mouth once daily.    clopidogreL (PLAVIX) 75 mg tablet Take 1 tablet (75 mg total) by mouth once daily.    irbesartan (AVAPRO) 150 MG tablet Take 1 tablet (150 mg total) by mouth every evening.    levothyroxine (SYNTHROID) 25 MCG tablet Take 1 tablet (25 mcg total) by mouth before breakfast.    metoprolol succinate (TOPROL-XL) 100 MG 24 hr tablet TAKE 1 TABLET TWICE DAILY    nitroGLYCERIN (NITROSTAT) 0.4 MG SL tablet Place 1 tablet (0.4 mg total) under the tongue every 5 (five) minutes as needed.    omeprazole (PRILOSEC) 40 MG capsule TAKE 1 CAPSULE EVERY  MORNING    rosuvastatin (CRESTOR) 40 MG Tab TAKE 1 TABLET ONE TIME DAILY     No current facility-administered medications for this visit.       Review of Systems   Constitutional: Negative for malaise/fatigue.   Cardiovascular:  Negative for chest pain, dyspnea on exertion, irregular heartbeat, leg swelling and palpitations.   Respiratory:  Negative for shortness of breath.    Hematologic/Lymphatic: Negative for bleeding problem.   Skin:  Negative for rash.   Musculoskeletal:  Negative for myalgias.   Gastrointestinal:  Negative for hematemesis, hematochezia and nausea.   Genitourinary:  Negative for hematuria.   Neurological:  Negative for light-headedness.   Psychiatric/Behavioral:  Negative for altered mental status.    Allergic/Immunologic: Negative for persistent infections.       Objective:          /62   Pulse 68   Wt 56.3 kg (124 lb 1.9 oz)   BMI 20.65 kg/m²     Physical Exam  Vitals and nursing note reviewed.   Constitutional:       Appearance: Normal appearance. He is well-developed.   HENT:      Head: Normocephalic.      Nose: Nose normal.   Eyes:      Pupils: Pupils are equal, round, and reactive to light.   Cardiovascular:      Rate and Rhythm: Normal rate and regular rhythm.   Pulmonary:      Effort: No respiratory distress.   Chest:      Comments: Device to LUCW. Incision and pocket in good repair.    Musculoskeletal:         General: Normal range of motion.   Skin:     General: Skin is warm and dry.      Findings: No erythema.   Neurological:      Mental Status: He is alert and oriented to person, place, and time.   Psychiatric:         Speech: Speech normal.         Behavior: Behavior normal.           Lab Results   Component Value Date     02/17/2025    K 4.4 02/17/2025    MG 2.2 02/17/2025    BUN 23 02/17/2025    CREATININE 1.7 (H) 02/17/2025    ALT 12 10/24/2022    AST 21 10/24/2022    HGB 14.0 02/17/2025    HCT 45.7 02/17/2025    TSH 0.973 02/17/2025    LDLCALC 43.6 (L) 04/11/2022        Recent Labs   Lab 05/31/23  1530 12/11/23  0913 02/06/24  1418   INR 1.0 1.0 1.0       Assessment:     1. ICD (implantable cardioverter-defibrillator) in place    2. HFrEF (heart failure with reduced ejection fraction)    3. Persistent atrial fibrillation    4. Essential hypertension    5. Coronary artery disease involving native coronary artery without angina pectoris, unspecified whether native or transplanted heart        Plan:     In summary, Mr. Song is an 80 y.o. male with CAD (PCI), ICM, MS, CKD, chronic anemia, AF/AFL, ICD here for follow up.  Mr. Song is doing well from a device perspective with stable lead and device function. Rare RV pacing. No CHF symptoms.  On eliquis at appropriate dose. Has had 2 episodes of AF since last clinic visit. Last episode in Feb lasting 4 hours. Some RVR noted. Will increase metoprolol due to pt's hx of inappropriate shock.   Pt is otherwise feeling well.    Increase toprol to 100mg AM, 150mg PM  Continue current medication regimen and device settings.   Follow up in device clinic as scheduled.   Follow up in EP clinic in 1 year, sooner as needed.     *A copy of this note has been sent to Dr. Raza*    Follow up in about 1 year (around 5/8/2026).      ------------------------------------------------------------------    LOUANN Evans, NP-C  Cardiac Electrophysiology

## 2025-05-08 ENCOUNTER — CLINICAL SUPPORT (OUTPATIENT)
Dept: CARDIOLOGY | Facility: HOSPITAL | Age: 80
End: 2025-05-08
Attending: NURSE PRACTITIONER
Payer: MEDICARE

## 2025-05-08 ENCOUNTER — OFFICE VISIT (OUTPATIENT)
Dept: ELECTROPHYSIOLOGY | Facility: CLINIC | Age: 80
End: 2025-05-08
Payer: MEDICARE

## 2025-05-08 ENCOUNTER — HOSPITAL ENCOUNTER (OUTPATIENT)
Dept: CARDIOLOGY | Facility: CLINIC | Age: 80
Discharge: HOME | End: 2025-05-08
Payer: MEDICARE

## 2025-05-08 VITALS
WEIGHT: 124.13 LBS | HEART RATE: 68 BPM | SYSTOLIC BLOOD PRESSURE: 137 MMHG | DIASTOLIC BLOOD PRESSURE: 62 MMHG | BODY MASS INDEX: 20.65 KG/M2

## 2025-05-08 DIAGNOSIS — I10 ESSENTIAL HYPERTENSION: Chronic | ICD-10-CM

## 2025-05-08 DIAGNOSIS — I48.19 PERSISTENT ATRIAL FIBRILLATION: Primary | ICD-10-CM

## 2025-05-08 DIAGNOSIS — I48.19 PERSISTENT ATRIAL FIBRILLATION: ICD-10-CM

## 2025-05-08 DIAGNOSIS — I48.19 PERSISTENT ATRIAL FIBRILLATION: Chronic | ICD-10-CM

## 2025-05-08 DIAGNOSIS — I50.20 HFREF (HEART FAILURE WITH REDUCED EJECTION FRACTION): ICD-10-CM

## 2025-05-08 DIAGNOSIS — Z95.810 ICD (IMPLANTABLE CARDIOVERTER-DEFIBRILLATOR) IN PLACE: Primary | ICD-10-CM

## 2025-05-08 LAB
OHS QRS DURATION: 112 MS
OHS QTC CALCULATION: 440 MS

## 2025-05-08 PROCEDURE — 1101F PT FALLS ASSESS-DOCD LE1/YR: CPT | Mod: CPTII,HCNC,S$GLB, | Performed by: NURSE PRACTITIONER

## 2025-05-08 PROCEDURE — 3078F DIAST BP <80 MM HG: CPT | Mod: CPTII,HCNC,S$GLB, | Performed by: NURSE PRACTITIONER

## 2025-05-08 PROCEDURE — 1159F MED LIST DOCD IN RCRD: CPT | Mod: CPTII,HCNC,S$GLB, | Performed by: NURSE PRACTITIONER

## 2025-05-08 PROCEDURE — 93010 ELECTROCARDIOGRAM REPORT: CPT | Mod: HCNC,S$GLB,, | Performed by: INTERNAL MEDICINE

## 2025-05-08 PROCEDURE — 99999 PR PBB SHADOW E&M-EST. PATIENT-LVL III: CPT | Mod: PBBFAC,HCNC,, | Performed by: NURSE PRACTITIONER

## 2025-05-08 PROCEDURE — 1157F ADVNC CARE PLAN IN RCRD: CPT | Mod: CPTII,HCNC,S$GLB, | Performed by: NURSE PRACTITIONER

## 2025-05-08 PROCEDURE — 3075F SYST BP GE 130 - 139MM HG: CPT | Mod: CPTII,HCNC,S$GLB, | Performed by: NURSE PRACTITIONER

## 2025-05-08 PROCEDURE — 1160F RVW MEDS BY RX/DR IN RCRD: CPT | Mod: CPTII,HCNC,S$GLB, | Performed by: NURSE PRACTITIONER

## 2025-05-08 PROCEDURE — 1126F AMNT PAIN NOTED NONE PRSNT: CPT | Mod: CPTII,HCNC,S$GLB, | Performed by: NURSE PRACTITIONER

## 2025-05-08 PROCEDURE — 3288F FALL RISK ASSESSMENT DOCD: CPT | Mod: CPTII,HCNC,S$GLB, | Performed by: NURSE PRACTITIONER

## 2025-05-08 PROCEDURE — 99214 OFFICE O/P EST MOD 30 MIN: CPT | Mod: HCNC,S$GLB,, | Performed by: NURSE PRACTITIONER

## 2025-05-08 RX ORDER — METOPROLOL SUCCINATE 100 MG/1
TABLET, EXTENDED RELEASE ORAL
Qty: 240 TABLET | Refills: 3 | Status: SHIPPED | OUTPATIENT
Start: 2025-05-08

## 2025-05-09 DIAGNOSIS — I73.9 PVD (PERIPHERAL VASCULAR DISEASE): Chronic | ICD-10-CM

## 2025-05-30 ENCOUNTER — TELEPHONE (OUTPATIENT)
Dept: CARDIOLOGY | Facility: CLINIC | Age: 80
End: 2025-05-30
Payer: MEDICARE

## 2025-05-30 DIAGNOSIS — N18.4 CKD (CHRONIC KIDNEY DISEASE) STAGE 4, GFR 15-29 ML/MIN: ICD-10-CM

## 2025-05-30 RX ORDER — LEVOTHYROXINE SODIUM 25 UG/1
25 TABLET ORAL
Qty: 30 TABLET | Refills: 11 | OUTPATIENT
Start: 2025-05-30 | End: 2026-05-30

## 2025-05-30 NOTE — TELEPHONE ENCOUNTER
----- Message from Jimmy Laws sent at 5/28/2025  4:52 PM CDT -----    ----- Message -----  From: Xiomara Silva  Sent: 5/28/2025   2:03 PM CDT  To: Davey Morocho Staff    Type:  RX Refill RequestWho Called: Michael/XochitlRefill or New Rx:refillRX Name and Strength:#omeprazole (PRILOSEC) 40 MG capsule;  and levothyroxine (SYNTHROID) 25 MCG tablet and Is this a 30 day or 90 day RX: 90 dayPreferred Pharmacy with phone number:[id number] - [place] [address] or Mail Order:localOrdering Provider:Silvia the patient rather a call back or a response via [facility]? callBest Call Back Number: [phone] Information:  Pt needs and emergency refill.

## 2025-06-18 ENCOUNTER — OFFICE VISIT (OUTPATIENT)
Dept: FAMILY MEDICINE | Facility: CLINIC | Age: 80
End: 2025-06-18
Payer: MEDICARE

## 2025-06-18 ENCOUNTER — LAB VISIT (OUTPATIENT)
Dept: LAB | Facility: HOSPITAL | Age: 80
End: 2025-06-18
Attending: FAMILY MEDICINE
Payer: MEDICARE

## 2025-06-18 VITALS
HEART RATE: 60 BPM | SYSTOLIC BLOOD PRESSURE: 110 MMHG | OXYGEN SATURATION: 95 % | WEIGHT: 126.56 LBS | DIASTOLIC BLOOD PRESSURE: 52 MMHG | TEMPERATURE: 97 F | HEIGHT: 65 IN | BODY MASS INDEX: 21.09 KG/M2

## 2025-06-18 DIAGNOSIS — E78.2 MIXED HYPERLIPIDEMIA: Chronic | ICD-10-CM

## 2025-06-18 DIAGNOSIS — N18.4 CKD (CHRONIC KIDNEY DISEASE) STAGE 4, GFR 15-29 ML/MIN: Chronic | ICD-10-CM

## 2025-06-18 DIAGNOSIS — D13.5 AMPULLARY ADENOMA: ICD-10-CM

## 2025-06-18 DIAGNOSIS — Z76.89 ENCOUNTER TO ESTABLISH CARE WITH NEW DOCTOR: Primary | ICD-10-CM

## 2025-06-18 DIAGNOSIS — K21.9 GERD WITHOUT ESOPHAGITIS: ICD-10-CM

## 2025-06-18 DIAGNOSIS — E03.9 HYPOTHYROIDISM, UNSPECIFIED TYPE: ICD-10-CM

## 2025-06-18 DIAGNOSIS — I73.9 PVD (PERIPHERAL VASCULAR DISEASE): Chronic | ICD-10-CM

## 2025-06-18 DIAGNOSIS — Z98.890 HISTORY OF BILIARY DUCT STENT PLACEMENT: ICD-10-CM

## 2025-06-18 DIAGNOSIS — Z79.01 ON ANTICOAGULANT THERAPY: ICD-10-CM

## 2025-06-18 DIAGNOSIS — R79.89 ABNORMAL CBC: ICD-10-CM

## 2025-06-18 DIAGNOSIS — D50.8 OTHER IRON DEFICIENCY ANEMIAS: ICD-10-CM

## 2025-06-18 DIAGNOSIS — R41.3 OTHER AMNESIA: ICD-10-CM

## 2025-06-18 DIAGNOSIS — Z23 NEED FOR VACCINATION AGAINST STREPTOCOCCUS PNEUMONIAE: ICD-10-CM

## 2025-06-18 DIAGNOSIS — I10 ESSENTIAL HYPERTENSION: Chronic | ICD-10-CM

## 2025-06-18 DIAGNOSIS — Z72.0 TOBACCO ABUSE: Chronic | ICD-10-CM

## 2025-06-18 PROBLEM — N17.9 ACUTE RENAL INJURY: Status: RESOLVED | Noted: 2022-04-10 | Resolved: 2025-06-18

## 2025-06-18 LAB
ABSOLUTE EOSINOPHIL (OHS): 0.25 K/UL
ABSOLUTE MONOCYTE (OHS): 0.76 K/UL (ref 0.3–1)
ABSOLUTE NEUTROPHIL COUNT (OHS): 5.15 K/UL (ref 1.8–7.7)
ALBUMIN SERPL BCP-MCNC: 3.9 G/DL (ref 3.5–5.2)
ALP SERPL-CCNC: 89 UNIT/L (ref 40–150)
ALT SERPL W/O P-5'-P-CCNC: 17 UNIT/L (ref 10–44)
ANION GAP (OHS): 9 MMOL/L (ref 8–16)
AST SERPL-CCNC: 17 UNIT/L (ref 11–45)
BASOPHILS # BLD AUTO: 0.07 K/UL
BASOPHILS NFR BLD AUTO: 0.9 %
BILIRUB SERPL-MCNC: 0.4 MG/DL (ref 0.1–1)
BUN SERPL-MCNC: 28 MG/DL (ref 8–23)
CALCIUM SERPL-MCNC: 10 MG/DL (ref 8.7–10.5)
CHLORIDE SERPL-SCNC: 108 MMOL/L (ref 95–110)
CHOLEST SERPL-MCNC: 113 MG/DL (ref 120–199)
CHOLEST/HDLC SERPL: 3.4 {RATIO} (ref 2–5)
CO2 SERPL-SCNC: 27 MMOL/L (ref 23–29)
CREAT SERPL-MCNC: 1.9 MG/DL (ref 0.5–1.4)
EAG (OHS): 120 MG/DL (ref 68–131)
ERYTHROCYTE [DISTWIDTH] IN BLOOD BY AUTOMATED COUNT: 13.6 % (ref 11.5–14.5)
FOLATE SERPL-MCNC: 14.6 NG/ML (ref 4–24)
GFR SERPLBLD CREATININE-BSD FMLA CKD-EPI: 35 ML/MIN/1.73/M2
GLUCOSE SERPL-MCNC: 94 MG/DL (ref 70–110)
HBA1C MFR BLD: 5.8 % (ref 4–5.6)
HCT VFR BLD AUTO: 45.6 % (ref 40–54)
HDLC SERPL-MCNC: 33 MG/DL (ref 40–75)
HDLC SERPL: 29.2 % (ref 20–50)
HGB BLD-MCNC: 13.6 GM/DL (ref 14–18)
IMM GRANULOCYTES # BLD AUTO: 0.01 K/UL (ref 0–0.04)
IMM GRANULOCYTES NFR BLD AUTO: 0.1 % (ref 0–0.5)
IRON SATN MFR SERPL: 26 % (ref 20–50)
IRON SERPL-MCNC: 74 UG/DL (ref 45–160)
LDLC SERPL CALC-MCNC: 43.8 MG/DL (ref 63–159)
LYMPHOCYTES # BLD AUTO: 1.56 K/UL (ref 1–4.8)
MCH RBC QN AUTO: 27.6 PG (ref 27–31)
MCHC RBC AUTO-ENTMCNC: 29.8 G/DL (ref 32–36)
MCV RBC AUTO: 93 FL (ref 82–98)
NONHDLC SERPL-MCNC: 80 MG/DL
NUCLEATED RBC (/100WBC) (OHS): 0 /100 WBC
PLATELET # BLD AUTO: 121 K/UL (ref 150–450)
PMV BLD AUTO: 11.6 FL (ref 9.2–12.9)
POTASSIUM SERPL-SCNC: 4.6 MMOL/L (ref 3.5–5.1)
PROT SERPL-MCNC: 7.3 GM/DL (ref 6–8.4)
RBC # BLD AUTO: 4.92 M/UL (ref 4.6–6.2)
RELATIVE EOSINOPHIL (OHS): 3.2 %
RELATIVE LYMPHOCYTE (OHS): 20 % (ref 18–48)
RELATIVE MONOCYTE (OHS): 9.7 % (ref 4–15)
RELATIVE NEUTROPHIL (OHS): 66.1 % (ref 38–73)
SODIUM SERPL-SCNC: 144 MMOL/L (ref 136–145)
TIBC SERPL-MCNC: 287 UG/DL (ref 250–450)
TRANSFERRIN SERPL-MCNC: 194 MG/DL (ref 200–375)
TRIGL SERPL-MCNC: 181 MG/DL (ref 30–150)
WBC # BLD AUTO: 7.8 K/UL (ref 3.9–12.7)

## 2025-06-18 PROCEDURE — 84443 ASSAY THYROID STIM HORMONE: CPT | Mod: HCNC

## 2025-06-18 PROCEDURE — 99205 OFFICE O/P NEW HI 60 MIN: CPT | Mod: HCNC,S$GLB,, | Performed by: FAMILY MEDICINE

## 2025-06-18 PROCEDURE — 80053 COMPREHEN METABOLIC PANEL: CPT | Mod: HCNC

## 2025-06-18 PROCEDURE — G2211 COMPLEX E/M VISIT ADD ON: HCPCS | Mod: HCNC,S$GLB,, | Performed by: FAMILY MEDICINE

## 2025-06-18 PROCEDURE — 82728 ASSAY OF FERRITIN: CPT | Mod: HCNC

## 2025-06-18 PROCEDURE — 82607 VITAMIN B-12: CPT | Mod: HCNC

## 2025-06-18 PROCEDURE — 85025 COMPLETE CBC W/AUTO DIFF WBC: CPT | Mod: HCNC

## 2025-06-18 PROCEDURE — 80061 LIPID PANEL: CPT | Mod: HCNC

## 2025-06-18 PROCEDURE — 3288F FALL RISK ASSESSMENT DOCD: CPT | Mod: CPTII,HCNC,S$GLB, | Performed by: FAMILY MEDICINE

## 2025-06-18 PROCEDURE — 99999 PR PBB SHADOW E&M-EST. PATIENT-LVL V: CPT | Mod: PBBFAC,HCNC,, | Performed by: FAMILY MEDICINE

## 2025-06-18 PROCEDURE — G0009 ADMIN PNEUMOCOCCAL VACCINE: HCPCS | Mod: HCNC,S$GLB,, | Performed by: FAMILY MEDICINE

## 2025-06-18 PROCEDURE — 90677 PCV20 VACCINE IM: CPT | Mod: HCNC,S$GLB,, | Performed by: FAMILY MEDICINE

## 2025-06-18 PROCEDURE — 36415 COLL VENOUS BLD VENIPUNCTURE: CPT | Mod: HCNC,PO

## 2025-06-18 PROCEDURE — 1126F AMNT PAIN NOTED NONE PRSNT: CPT | Mod: CPTII,HCNC,S$GLB, | Performed by: FAMILY MEDICINE

## 2025-06-18 PROCEDURE — 1160F RVW MEDS BY RX/DR IN RCRD: CPT | Mod: CPTII,HCNC,S$GLB, | Performed by: FAMILY MEDICINE

## 2025-06-18 PROCEDURE — 82746 ASSAY OF FOLIC ACID SERUM: CPT | Mod: HCNC

## 2025-06-18 PROCEDURE — 3074F SYST BP LT 130 MM HG: CPT | Mod: CPTII,HCNC,S$GLB, | Performed by: FAMILY MEDICINE

## 2025-06-18 PROCEDURE — 1157F ADVNC CARE PLAN IN RCRD: CPT | Mod: CPTII,HCNC,S$GLB, | Performed by: FAMILY MEDICINE

## 2025-06-18 PROCEDURE — 84466 ASSAY OF TRANSFERRIN: CPT | Mod: HCNC

## 2025-06-18 PROCEDURE — 3078F DIAST BP <80 MM HG: CPT | Mod: CPTII,HCNC,S$GLB, | Performed by: FAMILY MEDICINE

## 2025-06-18 PROCEDURE — 83036 HEMOGLOBIN GLYCOSYLATED A1C: CPT | Mod: HCNC

## 2025-06-18 PROCEDURE — 84439 ASSAY OF FREE THYROXINE: CPT | Mod: HCNC

## 2025-06-18 PROCEDURE — 1159F MED LIST DOCD IN RCRD: CPT | Mod: CPTII,HCNC,S$GLB, | Performed by: FAMILY MEDICINE

## 2025-06-18 PROCEDURE — 1101F PT FALLS ASSESS-DOCD LE1/YR: CPT | Mod: CPTII,HCNC,S$GLB, | Performed by: FAMILY MEDICINE

## 2025-06-18 PROCEDURE — 84425 ASSAY OF VITAMIN B-1: CPT | Mod: HCNC

## 2025-06-18 RX ORDER — OMEPRAZOLE 40 MG/1
CAPSULE, DELAYED RELEASE ORAL
Qty: 90 CAPSULE | Refills: 3 | OUTPATIENT
Start: 2025-06-18

## 2025-06-18 RX ORDER — IRBESARTAN 150 MG/1
150 TABLET ORAL NIGHTLY
Qty: 90 TABLET | Refills: 3 | Status: SHIPPED | OUTPATIENT
Start: 2025-06-18 | End: 2026-06-18

## 2025-06-18 RX ORDER — OMEPRAZOLE 40 MG/1
40 CAPSULE, DELAYED RELEASE ORAL EVERY MORNING
Qty: 90 CAPSULE | Refills: 3 | Status: SHIPPED | OUTPATIENT
Start: 2025-06-18

## 2025-06-18 RX ORDER — LEVOTHYROXINE SODIUM 25 UG/1
25 TABLET ORAL
Qty: 90 TABLET | Refills: 1 | Status: SHIPPED | OUTPATIENT
Start: 2025-06-18 | End: 2026-06-18

## 2025-06-18 RX ORDER — CLOPIDOGREL BISULFATE 75 MG/1
75 TABLET ORAL DAILY
Qty: 90 TABLET | Refills: 3 | Status: SHIPPED | OUTPATIENT
Start: 2025-06-18

## 2025-06-18 NOTE — PATIENT INSTRUCTIONS
HTN: mostly managed by specialists. On BB, ARB, and CCB  Likely due to HTN but also cardiac and CKD reasons    CAD/PAF: per cardiology/EP  CKD: per renal  GERD: continue ppi    Memory issues, new onset in the last 6 months.   Labs  MRI  Neurology    Unclear GI issue  Message sent to GI regarding f/u and/or monitoring  No response  yet  Refer to Gi.     Pneumonia vaccine today  RSV and shingrix and TDAP: at the pharmacy    See a dentist  Stop smoking!    Labs today  F/u 6 months.     RSV vaccines are licensed by the U.S. Food and Drug Administration for use in adults ages 60 and older in the [place].  CDC recommends everyone ages 75 and older get an RSV vaccine.  CDC recommends adults ages 60-74 who are at increased risk of severe RSV disease get an RSV vaccine.    Conditions that increase your risk for severe illness include:  Chronic heart or lung disease  Weakened immune system  Certain other medical conditions, including some people with diabetes and some people with obesity  Living in a nursing home    Shingrix vaccination is the only way to protect yourself against Shingles   Vaccination is over 90% effective at preventing shingles and postherpetic neuralgia in adults 50 years and older with healthy immune systems.  This vaccine is given as 2 doses 2-6 months apart

## 2025-06-18 NOTE — PROGRESS NOTES
"Subjective:       Patient ID: Rex Song is a 80 y.o. male.    Chief Complaint: Establish Care      Rex Song is a 80 y.o. male who presents today to establish care or for an annual exam    Exercise: he doesn't exercise.     Labs: ordered     Colon Cancer Screening: Last Colonoscopy completed on 1/4/2024     DLD: on crestor 40 mg.   HTN: on amlodipine and metoprolol and irbesartan. Bp at low is "good." No light headedness or dizziness reported.   GERD: on omeprazole. No stomach pain.   Hypothyroidism: on synthroid 25 mcg. TSH normal 2/2025.   PAF: seeing EP. On eliquis.   CAD: seeing cardiology. On plavix.   CKD: seeing renal.     Ampullary adenoma: unclear what f/u is needed. Message sent to GI. No response yet. Referral placed again.    He is having memory issues x 6-12 months. Wife is answering most of his questions for him. He is aware of his birthday. He is not aware of the year.  Not aware of the president. Thinks its shivani. Wife reports he is forgetting. Remote memory appears okay, but recent memory appears to be having issues. Doesn't get lost. He doesn't drive.   He hasn't gone to the dentist in year.       PMHx: reviewed in EMR and updated  Meds: reviewed in EMR and updated  Shx: reviewed in EMR and updated  Social: he was a . Here today with his wife, Jessica. 2 boys. Destrskyler and mark. 2 grandchildren. No pets at home.           Review of Systems   Constitutional:  Negative for chills and fever.   HENT:  Negative for trouble swallowing.    Respiratory:  Negative for cough, chest tightness and shortness of breath.    Cardiovascular:  Negative for chest pain.   Gastrointestinal:  Negative for nausea and vomiting.   Neurological:  Negative for dizziness and headaches.   Psychiatric/Behavioral:  Positive for behavioral problems, confusion and decreased concentration.          Health Maintenance Due   Topic Date Due    TETANUS VACCINE  Never done    Pneumococcal Vaccines (Age 50+) (1 " "of 2 - PCV) Never done    Shingles Vaccine (1 of 2) Never done    RSV Vaccine (Age 60+ and Pregnant patients) (1 - 1-dose 75+ series) Never done    COVID-19 Vaccine (5 - 2024-25 season) 09/01/2024     Immunization History   Administered Date(s) Administered    COVID-19 MRNA, LN-S PF (MODERNA HALF 0.25 ML DOSE) 01/14/2022    COVID-19, MRNA, LN-S, PF (MODERNA FULL 0.5 ML DOSE) 03/16/2021, 04/14/2021    COVID-19, mRNA, LNP-S, bivalent booster, PF (Moderna Omicron)12 + YEARS 01/23/2023    Influenza (FLUAD) - Quadrivalent - Adjuvanted - PF *Preferred* (65+) 02/19/2021    Influenza - Trivalent - Fluad - Adjuvanted - PF (65 years and older 02/07/2018           Objective:     Vitals:    06/18/25 1346   BP: (!) 110/52   BP Location: Left arm   Patient Position: Sitting   Pulse: 60   Temp: 97.1 °F (36.2 °C)   TempSrc: Temporal   SpO2: 95%   Weight: 57.4 kg (126 lb 8.7 oz)   Height: 5' 5" (1.651 m)        Physical Exam  Vitals and nursing note reviewed.   Cardiovascular:      Rate and Rhythm: Normal rate and regular rhythm.   Pulmonary:      Effort: Pulmonary effort is normal.      Comments: Diminished throughout with intermittent wheezing  Chest:       Abdominal:      Palpations: Abdomen is soft.      Tenderness: There is no abdominal tenderness.   Musculoskeletal:         General: No swelling.   Neurological:      Mental Status: He is alert.      Comments: Alert and oriented to person, possibly to place. Does not know the year. Does know he is in suyapa at the doctor's office.    Psychiatric:         Mood and Affect: Mood normal.         Assessment:       1. Encounter to establish care with new doctor    2. CKD (chronic kidney disease) stage 4, GFR 15-29 ml/min    3. On anticoagulant therapy    4. Other iron deficiency anemias    5. Hypothyroidism, unspecified type    6. History of biliary duct stent placement    7. Ampullary adenoma    8. PVD (peripheral vascular disease)    9. Tobacco abuse    10. Other amnesia    11. " Mixed hyperlipidemia    12. GERD without esophagitis    13. Essential hypertension    14. Abnormal CBC    15. Need for vaccination against Streptococcus pneumoniae        Plan:       HTN: mostly managed by specialists. On BB, ARB, and CCB  Likely due to HTN but also cardiac and CKD reasons    CAD/PAF: per cardiology/EP  CKD: per renal  GERD: continue ppi    Memory issues, new onset in the last 6 months.   Labs  CT  Has pacemaker, will hold off on MRI for now.   Neurology    Unclear GI issue  Message sent to GI regarding f/u and/or monitoring  No response  yet  Refer to Gi.     Pneumonia vaccine today  RSV and shingrix and TDAP: at the pharmacy    See a dentist  Stop smoking!    Labs today  F/u 6 months.     Patient is under my ongoing care. I will continue to be the focal point for all health care services the patient needs.       Encounter to establish care with new doctor    CKD (chronic kidney disease) stage 4, GFR 15-29 ml/min  -     levothyroxine (SYNTHROID) 25 MCG tablet; Take 1 tablet (25 mcg total) by mouth before breakfast.  Dispense: 90 tablet; Refill: 1    On anticoagulant therapy    Other iron deficiency anemias    Hypothyroidism, unspecified type  -     T4, Free; Future; Expected date: 06/18/2025    History of biliary duct stent placement  -     Ambulatory referral/consult to Gastroenterology; Future; Expected date: 06/25/2025    Ampullary adenoma  -     Ambulatory referral/consult to Gastroenterology; Future; Expected date: 06/25/2025    PVD (peripheral vascular disease)  -     clopidogreL (PLAVIX) 75 mg tablet; Take 1 tablet (75 mg total) by mouth once daily.  Dispense: 90 tablet; Refill: 3    Tobacco abuse    Other amnesia  -     Cancel: TSH; Future; Expected date: 06/18/2025  -     CBC Auto Differential; Future; Expected date: 06/18/2025  -     Comprehensive Metabolic Panel; Future; Expected date: 06/18/2025  -     TSH; Future; Expected date: 06/18/2025  -     Vitamin B12; Future; Expected date:  06/18/2025  -     Vitamin B1; Future; Expected date: 06/18/2025  -     MRI Brain Without Contrast; Future; Expected date: 06/18/2025  -     Iron and TIBC; Future; Expected date: 06/18/2025  -     FERRITIN; Future; Expected date: 06/18/2025  -     FOLATE; Future; Expected date: 06/18/2025  -     Ambulatory referral/consult to Neurology; Future; Expected date: 06/25/2025    Mixed hyperlipidemia  -     Lipid Panel; Future; Expected date: 06/18/2025    GERD without esophagitis  -     omeprazole (PRILOSEC) 40 MG capsule; Take 1 capsule (40 mg total) by mouth every morning.  Dispense: 90 capsule; Refill: 3    Essential hypertension  -     irbesartan (AVAPRO) 150 MG tablet; Take 1 tablet (150 mg total) by mouth every evening.  Dispense: 90 tablet; Refill: 3    Abnormal CBC  -     Hemoglobin A1C; Future; Expected date: 06/18/2025  -     Iron and TIBC; Future; Expected date: 06/18/2025  -     FERRITIN; Future; Expected date: 06/18/2025  -     FOLATE; Future; Expected date: 06/18/2025    Need for vaccination against Streptococcus pneumoniae  -     pneumoc 20-marilia conj-dip cr(PF) (PREVNAR-20 (PF)) injection Syrg 0.5 mL

## 2025-06-19 ENCOUNTER — RESULTS FOLLOW-UP (OUTPATIENT)
Dept: FAMILY MEDICINE | Facility: CLINIC | Age: 80
End: 2025-06-19

## 2025-06-19 ENCOUNTER — TELEPHONE (OUTPATIENT)
Dept: FAMILY MEDICINE | Facility: CLINIC | Age: 80
End: 2025-06-19
Payer: MEDICARE

## 2025-06-19 DIAGNOSIS — I63.9 INFARCTION OF LEFT BASAL GANGLIA: Primary | ICD-10-CM

## 2025-06-19 LAB
FERRITIN SERPL-MCNC: 120 NG/ML (ref 20–300)
T4 FREE SERPL-MCNC: 1.17 NG/DL (ref 0.71–1.51)
TSH SERPL-ACNC: 0.56 UIU/ML (ref 0.4–4)
VIT B12 SERPL-MCNC: 773 PG/ML (ref 210–950)

## 2025-06-19 NOTE — TELEPHONE ENCOUNTER
----- Message from Earl Hall MD sent at 6/19/2025  7:43 AM CDT -----  Thanks, we can do an EGD for surveillance  ----- Message -----  From: Carter Poole DO  Sent: 6/18/2025   2:44 PM CDT  To: Jana Marrero MD; RANGEL Rees

## 2025-06-23 DIAGNOSIS — Z00.00 ENCOUNTER FOR MEDICARE ANNUAL WELLNESS EXAM: ICD-10-CM

## 2025-06-24 ENCOUNTER — TELEPHONE (OUTPATIENT)
Dept: FAMILY MEDICINE | Facility: CLINIC | Age: 80
End: 2025-06-24
Payer: MEDICARE

## 2025-06-24 NOTE — TELEPHONE ENCOUNTER
----- Message from Carter Poole DO sent at 6/19/2025 10:05 AM CDT -----  Please call patient's wife    Mild low hgb  Mild low platelets  Similar to prior  Will monitor    Cholesterol well controlled  Continue meds    Kidney function is a little lower then baseline. No NSAIDS such as ibuprofen or naproxen. Avoid Red meats. Drink a lot of water. F/u with renal.     Iron normal    Sugar slightly high  You are prediabetic. Being prediabetic puts you at high risk of developing diabetes in the future. Please decrease your intake of white bread, white rice, corn, pasta, potatoes and sugar to prevent   diabetes. Regular daily exercise will also decrease your risk of developing diabetes.    TSH normal    B12 normal  Folate normal        ----- Message -----  From: Lab, Background User  Sent: 6/18/2025  11:15 PM CDT  To: Carter Poole DO

## 2025-06-24 NOTE — TELEPHONE ENCOUNTER
Called patient wife twice to go over recent lab results per Dr Poole. No answer. Left voicemail to return phone call to office.

## 2025-06-25 ENCOUNTER — TELEPHONE (OUTPATIENT)
Facility: CLINIC | Age: 80
End: 2025-06-25
Payer: MEDICARE

## 2025-06-25 ENCOUNTER — HOSPITAL ENCOUNTER (OUTPATIENT)
Dept: RADIOLOGY | Facility: HOSPITAL | Age: 80
Discharge: HOME | End: 2025-06-25
Attending: FAMILY MEDICINE
Payer: MEDICARE

## 2025-06-25 DIAGNOSIS — R41.3 OTHER AMNESIA: ICD-10-CM

## 2025-06-25 LAB — W VITAMIN B1: 92 UG/L

## 2025-06-25 PROCEDURE — 70450 CT HEAD/BRAIN W/O DYE: CPT | Mod: TC,HCNC

## 2025-06-25 PROCEDURE — 70450 CT HEAD/BRAIN W/O DYE: CPT | Mod: 26,HCNC,, | Performed by: RADIOLOGY

## 2025-06-25 NOTE — TELEPHONE ENCOUNTER
Michelle Diaz, Padmini Bernal  Cc: Fredrick Solis  [facility] please          Previous Messages       ----- Message -----  From: Laura Natarajan  Sent: 6/18/2025   2:53 PM CDT  To: Michelle Diaz RN  Subject: Referral                                        Good afternoon  Patient with a referral to Neurology  for Memory issues.  I was unable to find an appointment before  November Could you please assist scheduling patient  Thanks    Laura

## 2025-06-25 NOTE — TELEPHONE ENCOUNTER
Referral from Dr. Poole  Other Amnesia    No AD/Dementia biomarkers  No brain images  No cognitive screeners

## 2025-06-26 PROBLEM — I63.9 INFARCTION OF LEFT BASAL GANGLIA: Status: ACTIVE | Noted: 2025-06-26

## 2025-07-01 ENCOUNTER — TELEPHONE (OUTPATIENT)
Dept: GASTROENTEROLOGY | Facility: CLINIC | Age: 80
End: 2025-07-01
Payer: MEDICARE

## 2025-07-01 NOTE — TELEPHONE ENCOUNTER
Copied from CRM #[id number]. Topic: Appointments - Appointment Access  >> Jul 1, 2025  3:22 PM Myrna wrote:  Type:  Sooner Appointment Request    Caller is requesting a sooner appointment.  Caller declined first available appointment listed below.  Caller will not accept being placed on the waitlist and is requesting a message be sent to doctor.  Name of Caller:pts spouse   When is the first available appointment?books are closed   Symptoms:to discuss egd   Would the patient rather a call back or a response via [facility]? Call   Best Call Back Number: [phone]  Additional Information: pt had to cancel appt for 07/02 and requesting to reschedule

## 2025-07-14 ENCOUNTER — CLINICAL SUPPORT (OUTPATIENT)
Dept: CARDIOLOGY | Facility: HOSPITAL | Age: 80
End: 2025-07-14
Payer: MEDICARE

## 2025-07-14 ENCOUNTER — CLINICAL SUPPORT (OUTPATIENT)
Dept: CARDIOLOGY | Facility: HOSPITAL | Age: 80
End: 2025-07-14
Attending: INTERNAL MEDICINE
Payer: MEDICARE

## 2025-07-14 DIAGNOSIS — Z45.02 ENCOUNTER FOR ADJUSTMENT AND MANAGEMENT OF AUTOMATIC IMPLANTABLE CARDIAC DEFIBRILLATOR: ICD-10-CM

## 2025-07-14 PROCEDURE — 93295 DEV INTERROG REMOTE 1/2/MLT: CPT | Mod: HCNC,,, | Performed by: INTERNAL MEDICINE

## 2025-07-14 PROCEDURE — 93296 REM INTERROG EVL PM/IDS: CPT | Mod: HCNC | Performed by: INTERNAL MEDICINE

## 2025-07-28 LAB
OHS CV AF BURDEN PERCENT: 4
OHS CV DC REMOTE DEVICE TYPE: NORMAL
OHS CV ICD SHOCK: NO
OHS CV RV PACING PERCENT: 1 %

## 2025-07-30 ENCOUNTER — OFFICE VISIT (OUTPATIENT)
Dept: CARDIOLOGY | Facility: CLINIC | Age: 80
End: 2025-07-30
Payer: MEDICARE

## 2025-07-30 VITALS
HEIGHT: 65 IN | SYSTOLIC BLOOD PRESSURE: 116 MMHG | WEIGHT: 123.56 LBS | DIASTOLIC BLOOD PRESSURE: 69 MMHG | HEART RATE: 60 BPM | OXYGEN SATURATION: 98 % | BODY MASS INDEX: 20.59 KG/M2

## 2025-07-30 DIAGNOSIS — Z95.810 ICD (IMPLANTABLE CARDIOVERTER-DEFIBRILLATOR) IN PLACE: ICD-10-CM

## 2025-07-30 DIAGNOSIS — I50.20 HFREF (HEART FAILURE WITH REDUCED EJECTION FRACTION): ICD-10-CM

## 2025-07-30 DIAGNOSIS — I10 ESSENTIAL HYPERTENSION: Primary | ICD-10-CM

## 2025-07-30 DIAGNOSIS — N18.4 CKD (CHRONIC KIDNEY DISEASE) STAGE 4, GFR 15-29 ML/MIN: ICD-10-CM

## 2025-07-30 PROCEDURE — 99999 PR PBB SHADOW E&M-EST. PATIENT-LVL III: CPT | Mod: PBBFAC,HCNC,, | Performed by: INTERNAL MEDICINE

## 2025-07-30 PROCEDURE — 99213 OFFICE O/P EST LOW 20 MIN: CPT | Mod: HCNC,S$GLB,, | Performed by: INTERNAL MEDICINE

## 2025-07-30 PROCEDURE — 3078F DIAST BP <80 MM HG: CPT | Mod: CPTII,HCNC,S$GLB, | Performed by: INTERNAL MEDICINE

## 2025-07-30 PROCEDURE — 3288F FALL RISK ASSESSMENT DOCD: CPT | Mod: CPTII,HCNC,S$GLB, | Performed by: INTERNAL MEDICINE

## 2025-07-30 PROCEDURE — 1101F PT FALLS ASSESS-DOCD LE1/YR: CPT | Mod: CPTII,HCNC,S$GLB, | Performed by: INTERNAL MEDICINE

## 2025-07-30 PROCEDURE — 1157F ADVNC CARE PLAN IN RCRD: CPT | Mod: CPTII,HCNC,S$GLB, | Performed by: INTERNAL MEDICINE

## 2025-07-30 PROCEDURE — 1159F MED LIST DOCD IN RCRD: CPT | Mod: CPTII,HCNC,S$GLB, | Performed by: INTERNAL MEDICINE

## 2025-07-30 PROCEDURE — 1126F AMNT PAIN NOTED NONE PRSNT: CPT | Mod: CPTII,HCNC,S$GLB, | Performed by: INTERNAL MEDICINE

## 2025-07-30 PROCEDURE — 3074F SYST BP LT 130 MM HG: CPT | Mod: CPTII,HCNC,S$GLB, | Performed by: INTERNAL MEDICINE

## 2025-07-30 PROCEDURE — 1160F RVW MEDS BY RX/DR IN RCRD: CPT | Mod: CPTII,HCNC,S$GLB, | Performed by: INTERNAL MEDICINE

## 2025-08-11 PROBLEM — I63.9 INFARCTION OF LEFT BASAL GANGLIA: Status: RESOLVED | Noted: 2025-06-26 | Resolved: 2025-08-11

## (undated) DEVICE — PAD DEFIB CADENCE ADULT R2

## (undated) DEVICE — SLING SWATHE UNIVERSAL FOAM

## (undated) DEVICE — SAFESHEATH II 8FR 13CM

## (undated) DEVICE — SHEATH SAFESHEATH II ULTRA 6FR

## (undated) DEVICE — DRESSING AQUACEL AG ADV 3.5X6

## (undated) DEVICE — PACK PACER PERMANENT OMC

## (undated) DEVICE — ELECTRODE REM PLYHSV RETURN 9

## (undated) DEVICE — COVER EQUIP BAND STRL 40X60IN

## (undated) DEVICE — COVER INSTR ELASTIC BAND 40X20

## (undated) DEVICE — DRAPE INCISE IOBAN 2 23X17IN

## (undated) DEVICE — ADHESIVE DERMABOND ADVANCED